# Patient Record
Sex: FEMALE | Race: ASIAN | NOT HISPANIC OR LATINO | Employment: OTHER | URBAN - METROPOLITAN AREA
[De-identification: names, ages, dates, MRNs, and addresses within clinical notes are randomized per-mention and may not be internally consistent; named-entity substitution may affect disease eponyms.]

---

## 2019-10-21 ENCOUNTER — HOME CARE/HOSPICE - HEALTHEAST (OUTPATIENT)
Dept: HOME HEALTH SERVICES | Facility: HOME HEALTH | Age: 79
End: 2019-10-21

## 2019-10-22 ENCOUNTER — COMMUNICATION - HEALTHEAST (OUTPATIENT)
Dept: NEUROSURGERY | Facility: CLINIC | Age: 79
End: 2019-10-22

## 2019-10-22 DIAGNOSIS — S06.5XAA SDH (SUBDURAL HEMATOMA) (H): ICD-10-CM

## 2019-11-06 ENCOUNTER — HOSPITAL ENCOUNTER (OUTPATIENT)
Dept: CT IMAGING | Facility: CLINIC | Age: 79
Discharge: HOME | End: 2019-11-06
Attending: NEUROLOGICAL SURGERY

## 2019-11-06 ENCOUNTER — OFFICE VISIT - HEALTHEAST (OUTPATIENT)
Dept: NEUROSURGERY | Facility: CLINIC | Age: 79
End: 2019-11-06

## 2019-11-06 DIAGNOSIS — S06.5XAA SDH (SUBDURAL HEMATOMA) (H): ICD-10-CM

## 2019-11-06 DIAGNOSIS — S06.5XAA SUBDURAL HEMATOMA (H): ICD-10-CM

## 2019-11-06 ASSESSMENT — MIFFLIN-ST. JEOR: SCORE: 879.59

## 2020-07-29 ENCOUNTER — COMMUNICATION - HEALTHEAST (OUTPATIENT)
Dept: MEDSURG UNIT | Facility: CLINIC | Age: 80
End: 2020-07-29

## 2020-07-31 ENCOUNTER — COMMUNICATION - HEALTHEAST (OUTPATIENT)
Dept: SCHEDULING | Facility: CLINIC | Age: 80
End: 2020-07-31

## 2021-06-02 NOTE — TELEPHONE ENCOUNTER
PATIENT NAME:  Ivan Hook  YOB: 1940  MRN: [id number]  SURGEON: Dr. Gomez  DATE of CONSULT:  10-20-19  DIAGNOSIS:  FALL, ON ASA:  SDH    FOLLOW-UP:  Post HOSPITAL CONSULT F/U Visit: 2 weeks   Post-op Provider: NP  DIAGNOSTICS:  radiology: CT scan: HEAD, WITHOUT  (ORDERED 10-22-19)  DISPOSITION:  HOME WITH FAMILY/HOME HEALTH    ADDITIONAL INSTRUCTIONS FOR MEDICAL STAFF:    No Aspirin, Ibuprofen, Motrin, Advil, Aleve, Naproxen, or NSAIDs.   No Keppra

## 2021-06-03 VITALS
HEIGHT: 59 IN | WEIGHT: 110 LBS | DIASTOLIC BLOOD PRESSURE: 49 MMHG | HEART RATE: 83 BPM | BODY MASS INDEX: 22.18 KG/M2 | SYSTOLIC BLOOD PRESSURE: 81 MMHG | OXYGEN SATURATION: 93 %

## 2021-06-03 NOTE — PROGRESS NOTES
CHART NOTE     1940     DATE OF SERVICE: 11/6/2019     FOLLOW UP EVALUATION:      ASSESSMENT :Ivan Hook was last seen as an inpatient w tentorial SDH. She was treated conservatively.  She's here today for follow up.  She is seen w  and her daughter. She denies any symptoms--her imaging shows resolution of hematoma, and her exam is intact       PLAN: No further follow up needed         PAST MEDICAL HISTORY, SURGICAL HISTORY, REVIEW OF SYMPTOMS, MEDICATIONS AND ALLERGIES:  Past medical history, surgical history, review of symptoms, medications and allergies remain unchanged.    Vitals:    11/06/19 1102   BP: (!) 81/49   Pulse: 83   SpO2: 93%        PHYSICAL EXAM:  Neurological exam reveals:  Alert and oriented x3, speech fluent and appropriate.   PERRL, EOMI, face symmetric, tongue midline, shoulder shrug equal  No pronator drift, finger to nose smooth and accurate bilaterally  Arm strength bilateral grasp, biceps, triceps, and deltoids 5/5   Leg strength bilateral dorsiflexion, plantar flexion, and hip flexion 5/5  Muscle Bulk and tone wnl.   Reflexes:No pathological reflexes   Gait and station:She's in a wheelchair

## 2021-06-16 PROBLEM — I60.9 SAH (SUBARACHNOID HEMORRHAGE) (H): Status: ACTIVE | Noted: 2019-10-19

## 2021-06-16 PROBLEM — E87.20 ACIDOSIS: Status: ACTIVE | Noted: 2019-10-19

## 2021-06-16 PROBLEM — M81.0 OSTEOPOROSIS: Status: ACTIVE | Noted: 2019-10-20

## 2021-06-16 PROBLEM — S06.5XAA SDH (SUBDURAL HEMATOMA) (H): Status: ACTIVE | Noted: 2019-10-19

## 2021-06-16 PROBLEM — R53.1 WEAKNESS: Status: ACTIVE | Noted: 2019-10-19

## 2021-06-16 PROBLEM — E16.2 HYPOGLYCEMIA: Status: ACTIVE | Noted: 2019-10-20

## 2021-06-20 NOTE — LETTER
Letter by Severson, Tammie F, LPN at      Author: Severson, Tammie F, LPN Service: -- Author Type: --    Filed:  Encounter Date: 7/29/2020 Status: (Other)       7/29/2020        M[facility] Vang [address] [place] [address]    2019-nCOV   Date Value Ref Range Status   07/26/2020 Not Detected  Final     Comment:     Collection of multiple specimens from the same patient may be necessary to  detect the virus. The possibility of a false negative should be considered if  the patient's recent exposure or clinical presentation suggests 2019 nCOV  infection and diagnostic tests for other causes of illness are negative. Repeat  testing may be considered in this setting.  Viral RNA was extracted via a validated method and subsequently underwent  single step reverse transcriptase-real time polymerase chain reaction using  primers to the CDC specified N1,N2 gene targets of CoV2 and human RNP as an  internal control.  A negative result does not rule out the presence of real-time PCR inhibitors in  the specimen or COVID-19 RNA in concentrations below the limit of detection of  the assay. The possibility of a false negative should be considered if the  patients recent exposure or clinical presentation suggests COVID-19. Additional  testing or repeat testing requires consultation with the laboratory.  Nasopharyngeal specimen is the preferred choice for swab-based SARS CoV2  testing. When collection of a nasopharyngeal swab is not possible the following  are acceptable alternatives:  an oropharyngeal (OP) specimen collected by a healthcare professional, or a  nasal mid-turbinate (NMT) swab collected by a healthcare professional or by  onsite self-collection (using a flocked tapered swab), or an anterior nares  specimen collected by a healthcare professional or by onsite self-collection  (using a round foam swab). (Centers for Disease Control)  Testing performed by [facility], Room [address], [address]  [place] [address]. This test was developed and its  performance characteristics determined by the [facility]. It has not been cleared or approved by the FDA.  The laboratory is regulated under the Clinical Laboratory Improvement  Amendments of 1988 (CLIA-88) as qualified to perform high-complexity testing.  This test is used for clinical purposes. It should not be regarded as  investigational or for research.    Performed and/or entered by:  [place] [address]        No results found for: [id number]    This letter provides a written record that you were tested for COVID-19.    Daim ntawv no yog bushra tseg qhia tias koj tau alex kuaj tus kab mob COVID-19 lawm. SaibDate of Test (Hnub Rene Ntsuam Sim) sab saum no.     Tau kuaj pom tias koj tsis muaj mob. Qhov no txhais tias peb nrhiav tsis pom tus kab mob uas tsim ua mahad muaj mob COVID-19 thaum kuaj koj. Muaj daniel thaum alex kuaj yuav qhia tias tsis muaj mob tabsis qhov tseeb tiag muaj tus kab mob lawm. Qhov no tshwm sim tau thaum uas tus kab mob tau nyuam qhuav kis tshiab lawm xwb, ua ntej koj yuav hnov muaj mob.    Yog tias koj muaj mob   Nyob twjywm hauv tsev thiab nyob kom nrug lilliana ntawm lwm tus (cais nyob ib leeg) kom txog thaum koj ua tau raws li cov becky qhia TXHUA qhov hauv qab no:      Koj tsis ua npaws--thiab tsis tas yuav noj tshuaj los txo qhov ua npaws--tau 3 hnub txwm nkaus (72 teev). Thiab ?    Koj lwm dixie mob khees zog lawm. Piv txwv, koj qhov hnoos lossis alex ua pa nyuaj khees lawm. Thiab?    Yam tsawg kawg nkaus yog 10 hnub lawm txij thaum koj tau pib mob.    Ncua sijhawm no:    Nyob twjywm ntawm tsev xwb. Tsis txhob mus haujlwm, mus kawm ntawv lossis mus qhovtwg li.     Nyob twjywm hauv koj lub chav, txawm yuav noj mov los. Koj ib leeg nkaus xwb siv koj ib lub chav ector yog ua tau.    Nyob kom nrug lilliana ntawm lwm tus hauv koj tsev. Tsis txhob khawm,  "hnia lossis tuav pia. Tsis pub muaj qhua tuaj xyuas.    Tu cov chaw uas \"kov heev\" tas li (cov pob liaj qhov rooj, cov qaum rooj, cov pia tuav, thiab lwm yam.). Siv cov tshuaj tu tsev lossis daniel ntaub so tov tshuaj ntxuav. Koj yuav pom cov tshuaj yus siv tau tag nrho li ntawm EPA lub vej xaij nyob ntawm www.epa.gov/pesticide-registration/list-n-disinfectants-use-against-sars-cov-2.    Siv daim ntaub npog qhov ncauj qhov ntswg, ntawv this-suj lossis ntaub so los npog koj lub qhov ncauj thiab qhov ntswg matthias kom txhob kis kab mob tawm mus.    Muab ector thiab xab npum ntxuav koj ob txhais pia tas christine.    Yog yuav rov mus haujlwm  Nug koj tus lospav haujlwm yves puas muaj alex taw qhia yog yuav rov mus haujlwm.  Cov lospav haujlwm: Daim ntawv no yog ua daim ntawv ceeb toom ntawm kws tim mob qhia raws kevcai tias koj tus neeg ntiav ua haujlwm raug kuaj pom tias tsis mob COVID-19 lawm, raws li hnub kuaj bushra saum byron no.    English Translation    This letter provides a written record that you were tested for COVID-19. See Date of Test (Date of Test) above.     Your result was negative. This means that we didnt find the virus that causes COVID-19 in your sample. A test may show negative when you do actually have the virus. This can happen when the virus is in the early stages of infection, before you feel illness symptoms.    If you have symptoms   Stay home and away from others (self-isolate) until you meet ALL of the guidelines below:      Youve had no fever--and no medicine that reduces fever--for 3 full days (72 hours). And ?    Your other symptoms have gotten better. For example, your cough or breathing has improved. And?    At least 10 days have passed since your symptoms started.    During this time:    Stay home. Dont go to work, school or anywhere else.     Stay in your own room, including for meals. Use your own bathroom if you can.    Stay away from others in your home. No hugging, kissing or shaking hands. No " visitors.    Clean high touch surfaces often (doorknobs, counters, handles, etc.). Use a household cleaning spray or wipes. You can find a full list on the EPA website at www.epa.gov/pesticide-registration/list-n-disinfectants-use-against-sars-cov-2.    Cover your mouth and nose with a mask, tissue or washcloth to avoid spreading germs.    Wash your hands and face often with soap and water.    Going back to work  Check with your employer for any guidelines to follow for going back to work.    Employers: This document serves as formal notice that your employee tested negative for COVID-19, as of the testing date shown above.

## 2022-01-01 ENCOUNTER — HOSPITAL ENCOUNTER (INPATIENT)
Facility: HOSPITAL | Age: 82
LOS: 3 days | Discharge: HOME HEALTH | DRG: 177 | End: 2022-04-29
Attending: EMERGENCY MEDICINE | Admitting: HOSPITALIST
Payer: COMMERCIAL

## 2022-01-01 ENCOUNTER — APPOINTMENT (OUTPATIENT)
Dept: SPEECH THERAPY | Facility: HOSPITAL | Age: 82
DRG: 101 | End: 2022-01-01
Payer: COMMERCIAL

## 2022-01-01 ENCOUNTER — HOSPITAL ENCOUNTER (INPATIENT)
Facility: HOSPITAL | Age: 82
LOS: 1 days | DRG: 884 | End: 2022-07-19
Attending: EMERGENCY MEDICINE | Admitting: FAMILY MEDICINE
Payer: COMMERCIAL

## 2022-01-01 ENCOUNTER — APPOINTMENT (OUTPATIENT)
Dept: RADIOLOGY | Facility: HOSPITAL | Age: 82
DRG: 101 | End: 2022-01-01
Payer: COMMERCIAL

## 2022-01-01 ENCOUNTER — APPOINTMENT (OUTPATIENT)
Dept: PHYSICAL THERAPY | Facility: HOSPITAL | Age: 82
DRG: 177 | End: 2022-01-01
Attending: INTERNAL MEDICINE
Payer: COMMERCIAL

## 2022-01-01 ENCOUNTER — APPOINTMENT (OUTPATIENT)
Dept: CT IMAGING | Facility: HOSPITAL | Age: 82
DRG: 101 | End: 2022-01-01
Attending: EMERGENCY MEDICINE
Payer: COMMERCIAL

## 2022-01-01 ENCOUNTER — APPOINTMENT (OUTPATIENT)
Dept: RADIOLOGY | Facility: HOSPITAL | Age: 82
DRG: 884 | End: 2022-01-01
Attending: EMERGENCY MEDICINE
Payer: COMMERCIAL

## 2022-01-01 ENCOUNTER — APPOINTMENT (OUTPATIENT)
Dept: CT IMAGING | Facility: HOSPITAL | Age: 82
DRG: 177 | End: 2022-01-01
Attending: INTERNAL MEDICINE
Payer: COMMERCIAL

## 2022-01-01 ENCOUNTER — APPOINTMENT (OUTPATIENT)
Dept: PHYSICAL THERAPY | Facility: HOSPITAL | Age: 82
DRG: 101 | End: 2022-01-01
Payer: COMMERCIAL

## 2022-01-01 ENCOUNTER — PATIENT OUTREACH (OUTPATIENT)
Dept: CARE COORDINATION | Facility: CLINIC | Age: 82
End: 2022-01-01
Payer: COMMERCIAL

## 2022-01-01 ENCOUNTER — APPOINTMENT (OUTPATIENT)
Dept: CT IMAGING | Facility: HOSPITAL | Age: 82
DRG: 177 | End: 2022-01-01
Attending: EMERGENCY MEDICINE
Payer: COMMERCIAL

## 2022-01-01 ENCOUNTER — APPOINTMENT (OUTPATIENT)
Dept: NEUROLOGY | Facility: HOSPITAL | Age: 82
DRG: 101 | End: 2022-01-01
Attending: PSYCHIATRY & NEUROLOGY
Payer: COMMERCIAL

## 2022-01-01 ENCOUNTER — HOSPITAL ENCOUNTER (INPATIENT)
Facility: HOSPITAL | Age: 82
LOS: 4 days | Discharge: HOME | DRG: 101 | End: 2022-06-30
Attending: EMERGENCY MEDICINE | Admitting: STUDENT IN AN ORGANIZED HEALTH CARE EDUCATION/TRAINING PROGRAM
Payer: COMMERCIAL

## 2022-01-01 ENCOUNTER — APPOINTMENT (OUTPATIENT)
Dept: ULTRASOUND IMAGING | Facility: HOSPITAL | Age: 82
DRG: 177 | End: 2022-01-01
Attending: INTERNAL MEDICINE
Payer: COMMERCIAL

## 2022-01-01 ENCOUNTER — PATIENT OUTREACH (OUTPATIENT)
Dept: CARE COORDINATION | Facility: CLINIC | Age: 82
End: 2022-01-01

## 2022-01-01 ENCOUNTER — APPOINTMENT (OUTPATIENT)
Dept: RADIOLOGY | Facility: HOSPITAL | Age: 82
DRG: 177 | End: 2022-01-01
Attending: EMERGENCY MEDICINE
Payer: COMMERCIAL

## 2022-01-01 VITALS
DIASTOLIC BLOOD PRESSURE: 55 MMHG | OXYGEN SATURATION: 99 % | HEIGHT: 59 IN | BODY MASS INDEX: 14.32 KG/M2 | RESPIRATION RATE: 18 BRPM | TEMPERATURE: 98.3 F | SYSTOLIC BLOOD PRESSURE: 124 MMHG | HEART RATE: 99 BPM | WEIGHT: 71 LBS

## 2022-01-01 VITALS
HEART RATE: 76 BPM | RESPIRATION RATE: 18 BRPM | BODY MASS INDEX: 17.86 KG/M2 | WEIGHT: 77.19 LBS | DIASTOLIC BLOOD PRESSURE: 60 MMHG | TEMPERATURE: 98.1 F | OXYGEN SATURATION: 93 % | SYSTOLIC BLOOD PRESSURE: 124 MMHG | HEIGHT: 55 IN

## 2022-01-01 VITALS
HEART RATE: 172 BPM | TEMPERATURE: 102.8 F | OXYGEN SATURATION: 91 % | SYSTOLIC BLOOD PRESSURE: 95 MMHG | RESPIRATION RATE: 44 BRPM | DIASTOLIC BLOOD PRESSURE: 51 MMHG

## 2022-01-01 DIAGNOSIS — R56.9 SEIZURES (H): ICD-10-CM

## 2022-01-01 DIAGNOSIS — R40.2430 GLASGOW COMA SCALE TOTAL SCORE 3-8, UNSPECIFIED COMA TIMING (H): ICD-10-CM

## 2022-01-01 DIAGNOSIS — R62.7 FAILURE TO THRIVE IN ADULT: Primary | ICD-10-CM

## 2022-01-01 DIAGNOSIS — E83.42 HYPOMAGNESEMIA: ICD-10-CM

## 2022-01-01 DIAGNOSIS — M62.81 MUSCLE WEAKNESS (GENERALIZED): ICD-10-CM

## 2022-01-01 DIAGNOSIS — E87.0 HYPERNATREMIA: ICD-10-CM

## 2022-01-01 DIAGNOSIS — Z71.89 OTHER SPECIFIED COUNSELING: ICD-10-CM

## 2022-01-01 DIAGNOSIS — R41.0 DELIRIUM: ICD-10-CM

## 2022-01-01 DIAGNOSIS — A41.89 SEPSIS DUE TO OTHER ETIOLOGY (H): ICD-10-CM

## 2022-01-01 DIAGNOSIS — U07.1 INFECTION DUE TO 2019 NOVEL CORONAVIRUS: ICD-10-CM

## 2022-01-01 LAB
ALBUMIN SERPL-MCNC: 2.5 G/DL (ref 3.5–5)
ALBUMIN SERPL-MCNC: 2.8 G/DL (ref 3.5–5)
ALBUMIN SERPL-MCNC: 3 G/DL (ref 3.5–5)
ALBUMIN SERPL-MCNC: 3.6 G/DL (ref 3.5–5)
ALBUMIN UR-MCNC: 10 MG/DL
ALBUMIN UR-MCNC: 30 MG/DL
ALP SERPL-CCNC: 74 U/L (ref 45–120)
ALP SERPL-CCNC: 75 U/L (ref 45–120)
ALP SERPL-CCNC: 98 U/L (ref 45–120)
ALP SERPL-CCNC: 99 U/L (ref 45–120)
ALT SERPL W P-5'-P-CCNC: 11 U/L (ref 0–45)
ALT SERPL W P-5'-P-CCNC: 34 U/L (ref 0–45)
ALT SERPL W P-5'-P-CCNC: 9 U/L (ref 0–45)
ALT SERPL W P-5'-P-CCNC: <9 U/L (ref 0–45)
AMMONIA PLAS-SCNC: 21 UMOL/L (ref 11–35)
AMMONIA PLAS-SCNC: 25 UMOL/L (ref 11–35)
ANION GAP SERPL CALCULATED.3IONS-SCNC: 10 MMOL/L (ref 5–18)
ANION GAP SERPL CALCULATED.3IONS-SCNC: 12 MMOL/L (ref 5–18)
ANION GAP SERPL CALCULATED.3IONS-SCNC: 14 MMOL/L (ref 5–18)
ANION GAP SERPL CALCULATED.3IONS-SCNC: 18 MMOL/L (ref 5–18)
ANION GAP SERPL CALCULATED.3IONS-SCNC: 5 MMOL/L (ref 5–18)
ANION GAP SERPL CALCULATED.3IONS-SCNC: 5 MMOL/L (ref 5–18)
ANION GAP SERPL CALCULATED.3IONS-SCNC: 9 MMOL/L (ref 5–18)
APPEARANCE UR: CLEAR
APPEARANCE UR: CLEAR
APTT PPP: 37 SECONDS (ref 22–38)
AST SERPL W P-5'-P-CCNC: 106 U/L (ref 0–40)
AST SERPL W P-5'-P-CCNC: 17 U/L (ref 0–40)
AST SERPL W P-5'-P-CCNC: 24 U/L (ref 0–40)
AST SERPL W P-5'-P-CCNC: 35 U/L (ref 0–40)
ATRIAL RATE - MUSE: 77 BPM
ATRIAL RATE - MUSE: 99 BPM
BACTERIA #/AREA URNS HPF: ABNORMAL /HPF
BACTERIA BLD CULT: NO GROWTH
BACTERIA UR CULT: ABNORMAL
BACTERIA UR CULT: NO GROWTH
BASE EXCESS BLDV CALC-SCNC: 1 MMOL/L
BASE EXCESS BLDV CALC-SCNC: 3.6 MMOL/L
BASOPHILS # BLD AUTO: 0 10E3/UL (ref 0–0.2)
BASOPHILS # BLD MANUAL: 0 10E3/UL (ref 0–0.2)
BASOPHILS NFR BLD AUTO: 0 %
BASOPHILS NFR BLD MANUAL: 0 %
BILIRUB DIRECT SERPL-MCNC: 0.1 MG/DL
BILIRUB SERPL-MCNC: 0.3 MG/DL (ref 0–1)
BILIRUB SERPL-MCNC: 0.6 MG/DL (ref 0–1)
BILIRUB SERPL-MCNC: 0.7 MG/DL (ref 0–1)
BILIRUB SERPL-MCNC: 0.9 MG/DL (ref 0–1)
BILIRUB UR QL STRIP: NEGATIVE
BILIRUB UR QL STRIP: NEGATIVE
BUN SERPL-MCNC: 10 MG/DL (ref 8–28)
BUN SERPL-MCNC: 11 MG/DL (ref 8–28)
BUN SERPL-MCNC: 13 MG/DL (ref 8–28)
BUN SERPL-MCNC: 14 MG/DL (ref 8–28)
BUN SERPL-MCNC: 18 MG/DL (ref 8–28)
BUN SERPL-MCNC: 21 MG/DL (ref 8–28)
BUN SERPL-MCNC: 4 MG/DL (ref 8–28)
BUN SERPL-MCNC: 4 MG/DL (ref 8–28)
BUN SERPL-MCNC: 5 MG/DL (ref 8–28)
BUN SERPL-MCNC: 51 MG/DL (ref 8–28)
BUN SERPL-MCNC: 8 MG/DL (ref 8–28)
C REACTIVE PROTEIN LHE: 1.9 MG/DL (ref 0–0.8)
C REACTIVE PROTEIN LHE: 2.2 MG/DL (ref 0–0.8)
C REACTIVE PROTEIN LHE: 2.6 MG/DL (ref 0–0.8)
C REACTIVE PROTEIN LHE: 2.7 MG/DL (ref 0–0.8)
C REACTIVE PROTEIN LHE: 2.8 MG/DL (ref 0–0.8)
CALCIUM SERPL-MCNC: 8 MG/DL (ref 8.5–10.5)
CALCIUM SERPL-MCNC: 8.1 MG/DL (ref 8.5–10.5)
CALCIUM SERPL-MCNC: 8.5 MG/DL (ref 8.5–10.5)
CALCIUM SERPL-MCNC: 8.7 MG/DL (ref 8.5–10.5)
CALCIUM SERPL-MCNC: 8.7 MG/DL (ref 8.5–10.5)
CALCIUM SERPL-MCNC: 8.8 MG/DL (ref 8.5–10.5)
CALCIUM SERPL-MCNC: 9.7 MG/DL (ref 8.5–10.5)
CALCIUM SERPL-MCNC: 9.8 MG/DL (ref 8.5–10.5)
CHLORIDE BLD-SCNC: 102 MMOL/L (ref 98–107)
CHLORIDE BLD-SCNC: 103 MMOL/L (ref 98–107)
CHLORIDE BLD-SCNC: 104 MMOL/L (ref 98–107)
CHLORIDE BLD-SCNC: 108 MMOL/L (ref 98–107)
CHLORIDE BLD-SCNC: 108 MMOL/L (ref 98–107)
CHLORIDE BLD-SCNC: 109 MMOL/L (ref 98–107)
CHLORIDE BLD-SCNC: 109 MMOL/L (ref 98–107)
CHLORIDE BLD-SCNC: 110 MMOL/L (ref 98–107)
CHLORIDE BLD-SCNC: 113 MMOL/L (ref 98–107)
CO2 SERPL-SCNC: 21 MMOL/L (ref 22–31)
CO2 SERPL-SCNC: 22 MMOL/L (ref 22–31)
CO2 SERPL-SCNC: 23 MMOL/L (ref 22–31)
CO2 SERPL-SCNC: 24 MMOL/L (ref 22–31)
CO2 SERPL-SCNC: 25 MMOL/L (ref 22–31)
CO2 SERPL-SCNC: 29 MMOL/L (ref 22–31)
CO2 SERPL-SCNC: 32 MMOL/L (ref 22–31)
COLOR UR AUTO: ABNORMAL
COLOR UR AUTO: ABNORMAL
CREAT SERPL-MCNC: 0.65 MG/DL (ref 0.6–1.1)
CREAT SERPL-MCNC: 0.68 MG/DL (ref 0.6–1.1)
CREAT SERPL-MCNC: 0.68 MG/DL (ref 0.6–1.1)
CREAT SERPL-MCNC: 0.7 MG/DL (ref 0.6–1.1)
CREAT SERPL-MCNC: 0.72 MG/DL (ref 0.6–1.1)
CREAT SERPL-MCNC: 0.72 MG/DL (ref 0.6–1.1)
CREAT SERPL-MCNC: 0.78 MG/DL (ref 0.6–1.1)
CREAT SERPL-MCNC: 0.87 MG/DL (ref 0.6–1.1)
CREAT SERPL-MCNC: 0.91 MG/DL (ref 0.6–1.1)
CREAT SERPL-MCNC: 0.96 MG/DL (ref 0.6–1.1)
CREAT SERPL-MCNC: 1.34 MG/DL (ref 0.6–1.1)
CREAT SERPL-MCNC: 1.7 MG/DL (ref 0.6–1.1)
D DIMER PPP FEU-MCNC: 17.75 UG/ML FEU (ref 0–0.5)
D DIMER PPP FEU-MCNC: 2 UG/ML FEU (ref 0–0.5)
D DIMER PPP FEU-MCNC: 2.59 UG/ML FEU (ref 0–0.5)
D DIMER PPP FEU-MCNC: 3.05 UG/ML FEU (ref 0–0.5)
D DIMER PPP FEU-MCNC: 3.61 UG/ML FEU (ref 0–0.5)
DEPRECATED CALCIDIOL+CALCIFEROL SERPL-MC: 20 UG/L (ref 20–75)
DIASTOLIC BLOOD PRESSURE - MUSE: 76 MMHG
DIASTOLIC BLOOD PRESSURE - MUSE: NORMAL MMHG
EOSINOPHIL # BLD AUTO: 0 10E3/UL (ref 0–0.7)
EOSINOPHIL # BLD MANUAL: 0 10E3/UL (ref 0–0.7)
EOSINOPHIL NFR BLD AUTO: 0 %
EOSINOPHIL NFR BLD MANUAL: 0 %
ERYTHROCYTE [DISTWIDTH] IN BLOOD BY AUTOMATED COUNT: 12.6 % (ref 10–15)
ERYTHROCYTE [DISTWIDTH] IN BLOOD BY AUTOMATED COUNT: 12.8 % (ref 10–15)
ERYTHROCYTE [DISTWIDTH] IN BLOOD BY AUTOMATED COUNT: 12.8 % (ref 10–15)
ERYTHROCYTE [DISTWIDTH] IN BLOOD BY AUTOMATED COUNT: 13 % (ref 10–15)
ERYTHROCYTE [DISTWIDTH] IN BLOOD BY AUTOMATED COUNT: 13.1 % (ref 10–15)
ERYTHROCYTE [DISTWIDTH] IN BLOOD BY AUTOMATED COUNT: 13.2 % (ref 10–15)
FLUAV RNA SPEC QL NAA+PROBE: NEGATIVE
FLUBV RNA RESP QL NAA+PROBE: NEGATIVE
FOLATE SERPL-MCNC: 9.5 NG/ML
GFR SERPL CREATININE-BSD FRML MDRD: 30 ML/MIN/1.73M2
GFR SERPL CREATININE-BSD FRML MDRD: 40 ML/MIN/1.73M2
GFR SERPL CREATININE-BSD FRML MDRD: 59 ML/MIN/1.73M2
GFR SERPL CREATININE-BSD FRML MDRD: 63 ML/MIN/1.73M2
GFR SERPL CREATININE-BSD FRML MDRD: 67 ML/MIN/1.73M2
GFR SERPL CREATININE-BSD FRML MDRD: 76 ML/MIN/1.73M2
GFR SERPL CREATININE-BSD FRML MDRD: 84 ML/MIN/1.73M2
GFR SERPL CREATININE-BSD FRML MDRD: 84 ML/MIN/1.73M2
GFR SERPL CREATININE-BSD FRML MDRD: 86 ML/MIN/1.73M2
GFR SERPL CREATININE-BSD FRML MDRD: 88 ML/MIN/1.73M2
GFR SERPL CREATININE-BSD FRML MDRD: 88 ML/MIN/1.73M2
GFR SERPL CREATININE-BSD FRML MDRD: 89 ML/MIN/1.73M2
GLUCOSE BLD-MCNC: 101 MG/DL (ref 70–125)
GLUCOSE BLD-MCNC: 103 MG/DL (ref 70–125)
GLUCOSE BLD-MCNC: 106 MG/DL (ref 70–125)
GLUCOSE BLD-MCNC: 115 MG/DL (ref 70–125)
GLUCOSE BLD-MCNC: 251 MG/DL (ref 70–125)
GLUCOSE BLD-MCNC: 69 MG/DL (ref 70–125)
GLUCOSE BLD-MCNC: 76 MG/DL (ref 70–125)
GLUCOSE BLD-MCNC: 86 MG/DL (ref 70–125)
GLUCOSE BLD-MCNC: 90 MG/DL (ref 70–125)
GLUCOSE BLD-MCNC: 95 MG/DL (ref 70–125)
GLUCOSE BLD-MCNC: 99 MG/DL (ref 70–125)
GLUCOSE BLDC GLUCOMTR-MCNC: 101 MG/DL (ref 70–99)
GLUCOSE BLDC GLUCOMTR-MCNC: 105 MG/DL (ref 70–99)
GLUCOSE BLDC GLUCOMTR-MCNC: 107 MG/DL (ref 70–99)
GLUCOSE BLDC GLUCOMTR-MCNC: 114 MG/DL (ref 70–99)
GLUCOSE BLDC GLUCOMTR-MCNC: 117 MG/DL (ref 70–99)
GLUCOSE BLDC GLUCOMTR-MCNC: 130 MG/DL (ref 70–99)
GLUCOSE BLDC GLUCOMTR-MCNC: 130 MG/DL (ref 70–99)
GLUCOSE BLDC GLUCOMTR-MCNC: 133 MG/DL (ref 70–99)
GLUCOSE BLDC GLUCOMTR-MCNC: 135 MG/DL (ref 70–99)
GLUCOSE BLDC GLUCOMTR-MCNC: 138 MG/DL (ref 70–99)
GLUCOSE BLDC GLUCOMTR-MCNC: 141 MG/DL (ref 70–99)
GLUCOSE BLDC GLUCOMTR-MCNC: 147 MG/DL (ref 70–99)
GLUCOSE BLDC GLUCOMTR-MCNC: 149 MG/DL (ref 70–99)
GLUCOSE BLDC GLUCOMTR-MCNC: 153 MG/DL (ref 70–99)
GLUCOSE BLDC GLUCOMTR-MCNC: 158 MG/DL (ref 70–99)
GLUCOSE BLDC GLUCOMTR-MCNC: 160 MG/DL (ref 70–99)
GLUCOSE BLDC GLUCOMTR-MCNC: 163 MG/DL (ref 70–99)
GLUCOSE BLDC GLUCOMTR-MCNC: 172 MG/DL (ref 70–99)
GLUCOSE BLDC GLUCOMTR-MCNC: 179 MG/DL (ref 70–99)
GLUCOSE BLDC GLUCOMTR-MCNC: 182 MG/DL (ref 70–99)
GLUCOSE BLDC GLUCOMTR-MCNC: 201 MG/DL (ref 70–99)
GLUCOSE BLDC GLUCOMTR-MCNC: 205 MG/DL (ref 70–99)
GLUCOSE BLDC GLUCOMTR-MCNC: 60 MG/DL (ref 70–99)
GLUCOSE BLDC GLUCOMTR-MCNC: 63 MG/DL (ref 70–99)
GLUCOSE BLDC GLUCOMTR-MCNC: 67 MG/DL (ref 70–99)
GLUCOSE BLDC GLUCOMTR-MCNC: 69 MG/DL (ref 70–99)
GLUCOSE BLDC GLUCOMTR-MCNC: 70 MG/DL (ref 70–99)
GLUCOSE BLDC GLUCOMTR-MCNC: 72 MG/DL (ref 70–99)
GLUCOSE BLDC GLUCOMTR-MCNC: 73 MG/DL (ref 70–99)
GLUCOSE BLDC GLUCOMTR-MCNC: 74 MG/DL (ref 70–99)
GLUCOSE BLDC GLUCOMTR-MCNC: 82 MG/DL (ref 70–99)
GLUCOSE BLDC GLUCOMTR-MCNC: 82 MG/DL (ref 70–99)
GLUCOSE BLDC GLUCOMTR-MCNC: 88 MG/DL (ref 70–99)
GLUCOSE BLDC GLUCOMTR-MCNC: 94 MG/DL (ref 70–99)
GLUCOSE BLDC GLUCOMTR-MCNC: 95 MG/DL (ref 70–99)
GLUCOSE BLDC GLUCOMTR-MCNC: 97 MG/DL (ref 70–99)
GLUCOSE BLDC GLUCOMTR-MCNC: 99 MG/DL (ref 70–99)
GLUCOSE UR STRIP-MCNC: NEGATIVE MG/DL
GLUCOSE UR STRIP-MCNC: NEGATIVE MG/DL
HBA1C MFR BLD: 5 %
HCO3 BLDV-SCNC: 24 MMOL/L (ref 24–30)
HCO3 BLDV-SCNC: 26 MMOL/L (ref 24–30)
HCT VFR BLD AUTO: 28.4 % (ref 35–47)
HCT VFR BLD AUTO: 28.6 % (ref 35–47)
HCT VFR BLD AUTO: 28.6 % (ref 35–47)
HCT VFR BLD AUTO: 29 % (ref 35–47)
HCT VFR BLD AUTO: 30.6 % (ref 35–47)
HCT VFR BLD AUTO: 30.6 % (ref 35–47)
HCT VFR BLD AUTO: 30.8 % (ref 35–47)
HCT VFR BLD AUTO: 30.8 % (ref 35–47)
HCT VFR BLD AUTO: 31 % (ref 35–47)
HCT VFR BLD AUTO: 32.2 % (ref 35–47)
HCT VFR BLD AUTO: 35.8 % (ref 35–47)
HCT VFR BLD AUTO: 36.3 % (ref 35–47)
HGB BLD-MCNC: 10 G/DL (ref 11.7–15.7)
HGB BLD-MCNC: 10.1 G/DL (ref 11.7–15.7)
HGB BLD-MCNC: 10.4 G/DL (ref 11.7–15.7)
HGB BLD-MCNC: 11 G/DL (ref 11.7–15.7)
HGB BLD-MCNC: 11.7 G/DL (ref 11.7–15.7)
HGB BLD-MCNC: 9.1 G/DL (ref 11.7–15.7)
HGB BLD-MCNC: 9.1 G/DL (ref 11.7–15.7)
HGB BLD-MCNC: 9.2 G/DL (ref 11.7–15.7)
HGB BLD-MCNC: 9.3 G/DL (ref 11.7–15.7)
HGB BLD-MCNC: 9.8 G/DL (ref 11.7–15.7)
HGB BLD-MCNC: 9.8 G/DL (ref 11.7–15.7)
HGB BLD-MCNC: 9.9 G/DL (ref 11.7–15.7)
HGB UR QL STRIP: ABNORMAL
HGB UR QL STRIP: ABNORMAL
HOLD SPECIMEN: NORMAL
HYALINE CASTS: 1 /LPF
HYALINE CASTS: 1 /LPF
IMM GRANULOCYTES # BLD: 0.1 10E3/UL
IMM GRANULOCYTES # BLD: 0.1 10E3/UL
IMM GRANULOCYTES # BLD: 0.2 10E3/UL
IMM GRANULOCYTES NFR BLD: 1 %
IMM GRANULOCYTES NFR BLD: 1 %
IMM GRANULOCYTES NFR BLD: 2 %
INR PPP: 1.19 (ref 0.85–1.15)
INR PPP: 1.29 (ref 0.9–1.15)
INTERPRETATION ECG - MUSE: NORMAL
INTERPRETATION ECG - MUSE: NORMAL
IRON SERPL-MCNC: 76 UG/DL (ref 42–175)
KETONES UR STRIP-MCNC: 40 MG/DL
KETONES UR STRIP-MCNC: 40 MG/DL
LACTATE SERPL-SCNC: 1 MMOL/L (ref 0.7–2)
LACTATE SERPL-SCNC: 4.1 MMOL/L (ref 0.7–2)
LDH SERPL L TO P-CCNC: 266 U/L (ref 125–220)
LEUKOCYTE ESTERASE UR QL STRIP: NEGATIVE
LEUKOCYTE ESTERASE UR QL STRIP: NEGATIVE
LEVETIRACETAM SERPL-MCNC: 22.1 ΜG/ML (ref 10–40)
LEVETIRACETAM SERPL-MCNC: <2 ΜG/ML (ref 10–40)
LIPASE SERPL-CCNC: 24 U/L (ref 0–52)
LYMPHOCYTES # BLD AUTO: 2 10E3/UL (ref 0.8–5.3)
LYMPHOCYTES # BLD AUTO: 2.1 10E3/UL (ref 0.8–5.3)
LYMPHOCYTES # BLD AUTO: 2.7 10E3/UL (ref 0.8–5.3)
LYMPHOCYTES # BLD MANUAL: 3.3 10E3/UL (ref 0.8–5.3)
LYMPHOCYTES NFR BLD AUTO: 22 %
LYMPHOCYTES NFR BLD AUTO: 23 %
LYMPHOCYTES NFR BLD AUTO: 26 %
LYMPHOCYTES NFR BLD MANUAL: 10 %
MAGNESIUM SERPL-MCNC: 1.3 MG/DL (ref 1.8–2.6)
MAGNESIUM SERPL-MCNC: 1.5 MG/DL (ref 1.8–2.6)
MAGNESIUM SERPL-MCNC: 1.6 MG/DL (ref 1.8–2.6)
MAGNESIUM SERPL-MCNC: 1.7 MG/DL (ref 1.8–2.6)
MAGNESIUM SERPL-MCNC: 1.8 MG/DL (ref 1.8–2.6)
MAGNESIUM SERPL-MCNC: 1.8 MG/DL (ref 1.8–2.6)
MAGNESIUM SERPL-MCNC: 1.9 MG/DL (ref 1.8–2.6)
MAGNESIUM SERPL-MCNC: 1.9 MG/DL (ref 1.8–2.6)
MAGNESIUM SERPL-MCNC: 2 MG/DL (ref 1.8–2.6)
MAGNESIUM SERPL-MCNC: 2.1 MG/DL (ref 1.8–2.6)
MCH RBC QN AUTO: 30.3 PG (ref 26.5–33)
MCH RBC QN AUTO: 30.7 PG (ref 26.5–33)
MCH RBC QN AUTO: 30.7 PG (ref 26.5–33)
MCH RBC QN AUTO: 30.8 PG (ref 26.5–33)
MCH RBC QN AUTO: 31 PG (ref 26.5–33)
MCH RBC QN AUTO: 31.2 PG (ref 26.5–33)
MCH RBC QN AUTO: 31.3 PG (ref 26.5–33)
MCH RBC QN AUTO: 31.5 PG (ref 26.5–33)
MCH RBC QN AUTO: 31.7 PG (ref 26.5–33)
MCH RBC QN AUTO: 31.7 PG (ref 26.5–33)
MCH RBC QN AUTO: 32.1 PG (ref 26.5–33)
MCH RBC QN AUTO: 32.2 PG (ref 26.5–33)
MCHC RBC AUTO-ENTMCNC: 30.3 G/DL (ref 31.5–36.5)
MCHC RBC AUTO-ENTMCNC: 31.6 G/DL (ref 31.5–36.5)
MCHC RBC AUTO-ENTMCNC: 31.8 G/DL (ref 31.5–36.5)
MCHC RBC AUTO-ENTMCNC: 31.8 G/DL (ref 31.5–36.5)
MCHC RBC AUTO-ENTMCNC: 32 G/DL (ref 31.5–36.5)
MCHC RBC AUTO-ENTMCNC: 32.1 G/DL (ref 31.5–36.5)
MCHC RBC AUTO-ENTMCNC: 32.2 G/DL (ref 31.5–36.5)
MCHC RBC AUTO-ENTMCNC: 32.3 G/DL (ref 31.5–36.5)
MCHC RBC AUTO-ENTMCNC: 32.4 G/DL (ref 31.5–36.5)
MCHC RBC AUTO-ENTMCNC: 32.5 G/DL (ref 31.5–36.5)
MCHC RBC AUTO-ENTMCNC: 32.7 G/DL (ref 31.5–36.5)
MCHC RBC AUTO-ENTMCNC: 33 G/DL (ref 31.5–36.5)
MCV RBC AUTO: 100 FL (ref 78–100)
MCV RBC AUTO: 103 FL (ref 78–100)
MCV RBC AUTO: 93 FL (ref 78–100)
MCV RBC AUTO: 95 FL (ref 78–100)
MCV RBC AUTO: 95 FL (ref 78–100)
MCV RBC AUTO: 96 FL (ref 78–100)
MCV RBC AUTO: 98 FL (ref 78–100)
MCV RBC AUTO: 99 FL (ref 78–100)
MCV RBC AUTO: 99 FL (ref 78–100)
MONOCYTES # BLD AUTO: 0.9 10E3/UL (ref 0–1.3)
MONOCYTES # BLD AUTO: 1.3 10E3/UL (ref 0–1.3)
MONOCYTES # BLD AUTO: 1.3 10E3/UL (ref 0–1.3)
MONOCYTES # BLD MANUAL: 4.9 10E3/UL (ref 0–1.3)
MONOCYTES NFR BLD AUTO: 11 %
MONOCYTES NFR BLD AUTO: 12 %
MONOCYTES NFR BLD AUTO: 14 %
MONOCYTES NFR BLD MANUAL: 15 %
MUCOUS THREADS #/AREA URNS LPF: PRESENT /LPF
NEUTROPHILS # BLD AUTO: 4.8 10E3/UL (ref 1.6–8.3)
NEUTROPHILS # BLD AUTO: 5.9 10E3/UL (ref 1.6–8.3)
NEUTROPHILS # BLD AUTO: 8 10E3/UL (ref 1.6–8.3)
NEUTROPHILS # BLD MANUAL: 24.5 10E3/UL (ref 1.6–8.3)
NEUTROPHILS NFR BLD AUTO: 61 %
NEUTROPHILS NFR BLD AUTO: 62 %
NEUTROPHILS NFR BLD AUTO: 65 %
NEUTROPHILS NFR BLD MANUAL: 75 %
NITRATE UR QL: NEGATIVE
NITRATE UR QL: POSITIVE
NRBC # BLD AUTO: 0 10E3/UL
NRBC BLD AUTO-RTO: 0 /100
OXYHGB MFR BLDV: 27.4 % (ref 70–75)
OXYHGB MFR BLDV: 39 % (ref 70–75)
P AXIS - MUSE: 68 DEGREES
P AXIS - MUSE: 70 DEGREES
PATH REPORT.COMMENTS IMP SPEC: NORMAL
PATH REPORT.COMMENTS IMP SPEC: NORMAL
PATH REPORT.FINAL DX SPEC: NORMAL
PATH REPORT.MICROSCOPIC SPEC OTHER STN: NORMAL
PATH REPORT.MICROSCOPIC SPEC OTHER STN: NORMAL
PCO2 BLDV: 53 MM HG (ref 35–50)
PCO2 BLDV: 54 MM HG (ref 35–50)
PH BLDV: 7.32 [PH] (ref 7.35–7.45)
PH BLDV: 7.35 [PH] (ref 7.35–7.45)
PH UR STRIP: 6 [PH] (ref 5–7)
PH UR STRIP: 6.5 [PH] (ref 5–7)
PLAT MORPH BLD: ABNORMAL
PLATELET # BLD AUTO: 122 10E3/UL (ref 150–450)
PLATELET # BLD AUTO: 144 10E3/UL (ref 150–450)
PLATELET # BLD AUTO: 196 10E3/UL (ref 150–450)
PLATELET # BLD AUTO: 207 10E3/UL (ref 150–450)
PLATELET # BLD AUTO: 211 10E3/UL (ref 150–450)
PLATELET # BLD AUTO: 211 10E3/UL (ref 150–450)
PLATELET # BLD AUTO: 241 10E3/UL (ref 150–450)
PLATELET # BLD AUTO: 69 10E3/UL (ref 150–450)
PLATELET # BLD AUTO: 69 10E3/UL (ref 150–450)
PLATELET # BLD AUTO: 81 10E3/UL (ref 150–450)
PLATELET # BLD AUTO: 91 10E3/UL (ref 150–450)
PLATELET # BLD AUTO: ABNORMAL 10*3/UL
PO2 BLDV: 23 MM HG (ref 25–47)
PO2 BLDV: 27 MM HG (ref 25–47)
POTASSIUM BLD-SCNC: 3 MMOL/L (ref 3.5–5)
POTASSIUM BLD-SCNC: 3.4 MMOL/L (ref 3.5–5)
POTASSIUM BLD-SCNC: 3.6 MMOL/L (ref 3.5–5)
POTASSIUM BLD-SCNC: 3.7 MMOL/L (ref 3.5–5)
POTASSIUM BLD-SCNC: 3.8 MMOL/L (ref 3.5–5)
POTASSIUM BLD-SCNC: 3.8 MMOL/L (ref 3.5–5)
POTASSIUM BLD-SCNC: 3.9 MMOL/L (ref 3.5–5)
POTASSIUM BLD-SCNC: 4 MMOL/L (ref 3.5–5)
POTASSIUM BLD-SCNC: 4.1 MMOL/L (ref 3.5–5)
POTASSIUM BLD-SCNC: 4.5 MMOL/L (ref 3.5–5)
PR INTERVAL - MUSE: 188 MS
PR INTERVAL - MUSE: 196 MS
PROCALCITONIN SERPL-MCNC: 0.02 NG/ML (ref 0–0.49)
PROT SERPL-MCNC: 6.8 G/DL (ref 6–8)
PROT SERPL-MCNC: 7.9 G/DL (ref 6–8)
PROT SERPL-MCNC: 8.1 G/DL (ref 6–8)
PROT SERPL-MCNC: 8.9 G/DL (ref 6–8)
QRS DURATION - MUSE: 60 MS
QRS DURATION - MUSE: 68 MS
QT - MUSE: 348 MS
QT - MUSE: 374 MS
QTC - MUSE: 423 MS
QTC - MUSE: 446 MS
R AXIS - MUSE: 12 DEGREES
R AXIS - MUSE: 6 DEGREES
RBC # BLD AUTO: 2.9 10E6/UL (ref 3.8–5.2)
RBC # BLD AUTO: 2.92 10E6/UL (ref 3.8–5.2)
RBC # BLD AUTO: 3 10E6/UL (ref 3.8–5.2)
RBC # BLD AUTO: 3.03 10E6/UL (ref 3.8–5.2)
RBC # BLD AUTO: 3.11 10E6/UL (ref 3.8–5.2)
RBC # BLD AUTO: 3.15 10E6/UL (ref 3.8–5.2)
RBC # BLD AUTO: 3.16 10E6/UL (ref 3.8–5.2)
RBC # BLD AUTO: 3.23 10E6/UL (ref 3.8–5.2)
RBC # BLD AUTO: 3.23 10E6/UL (ref 3.8–5.2)
RBC # BLD AUTO: 3.28 10E6/UL (ref 3.8–5.2)
RBC # BLD AUTO: 3.52 10E6/UL (ref 3.8–5.2)
RBC # BLD AUTO: 3.64 10E6/UL (ref 3.8–5.2)
RBC MORPH BLD: ABNORMAL
RBC URINE: 1 /HPF
RBC URINE: 8 /HPF
RETICS # AUTO: 0.04 10E6/UL (ref 0.01–0.11)
RETICS/RBC NFR AUTO: 1.1 % (ref 0.8–2.7)
SAO2 % BLDV: 27.9 % (ref 70–75)
SAO2 % BLDV: 39.8 % (ref 70–75)
SARS-COV-2 RNA RESP QL NAA+PROBE: NEGATIVE
SARS-COV-2 RNA RESP QL NAA+PROBE: NEGATIVE
SARS-COV-2 RNA RESP QL NAA+PROBE: POSITIVE
SARS-COV-2 RNA RESP QL NAA+PROBE: POSITIVE
SODIUM SERPL-SCNC: 138 MMOL/L (ref 136–145)
SODIUM SERPL-SCNC: 138 MMOL/L (ref 136–145)
SODIUM SERPL-SCNC: 141 MMOL/L (ref 136–145)
SODIUM SERPL-SCNC: 141 MMOL/L (ref 136–145)
SODIUM SERPL-SCNC: 143 MMOL/L (ref 136–145)
SODIUM SERPL-SCNC: 143 MMOL/L (ref 136–145)
SODIUM SERPL-SCNC: 144 MMOL/L (ref 136–145)
SODIUM SERPL-SCNC: 146 MMOL/L (ref 136–145)
SODIUM SERPL-SCNC: 155 MMOL/L (ref 136–145)
SP GR UR STRIP: 1.02 (ref 1–1.03)
SP GR UR STRIP: 1.02 (ref 1–1.03)
SQUAMOUS EPITHELIAL: <1 /HPF
SYSTOLIC BLOOD PRESSURE - MUSE: 133 MMHG
SYSTOLIC BLOOD PRESSURE - MUSE: NORMAL MMHG
T AXIS - MUSE: 20 DEGREES
T AXIS - MUSE: 76 DEGREES
TROPONIN I SERPL-MCNC: 0.06 NG/ML (ref 0–0.29)
TROPONIN I SERPL-MCNC: <0.01 NG/ML (ref 0–0.29)
TSH SERPL DL<=0.005 MIU/L-ACNC: 1.31 UIU/ML (ref 0.3–5)
UROBILINOGEN UR STRIP-MCNC: <2 MG/DL
UROBILINOGEN UR STRIP-MCNC: <2 MG/DL
VALPROATE SERPL-MCNC: 18.1 UG/ML
VALPROATE SERPL-MCNC: 41.4 UG/ML
VENTRICULAR RATE- MUSE: 77 BPM
VENTRICULAR RATE- MUSE: 99 BPM
VIT B12 SERPL-MCNC: 567 PG/ML (ref 213–816)
WBC # BLD AUTO: 10.3 10E3/UL (ref 4–11)
WBC # BLD AUTO: 12 10E3/UL (ref 4–11)
WBC # BLD AUTO: 32.6 10E3/UL (ref 4–11)
WBC # BLD AUTO: 7.1 10E3/UL (ref 4–11)
WBC # BLD AUTO: 7.3 10E3/UL (ref 4–11)
WBC # BLD AUTO: 7.7 10E3/UL (ref 4–11)
WBC # BLD AUTO: 8.1 10E3/UL (ref 4–11)
WBC # BLD AUTO: 8.2 10E3/UL (ref 4–11)
WBC # BLD AUTO: 8.9 10E3/UL (ref 4–11)
WBC # BLD AUTO: 9.3 10E3/UL (ref 4–11)
WBC # BLD AUTO: 9.6 10E3/UL (ref 4–11)
WBC # BLD AUTO: 9.6 10E3/UL (ref 4–11)
WBC URINE: 1 /HPF
WBC URINE: 5 /HPF

## 2022-01-01 PROCEDURE — 82962 GLUCOSE BLOOD TEST: CPT

## 2022-01-01 PROCEDURE — 96367 TX/PROPH/DG ADDL SEQ IV INF: CPT

## 2022-01-01 PROCEDURE — 250N000009 HC RX 250: Performed by: INTERNAL MEDICINE

## 2022-01-01 PROCEDURE — 258N000003 HC RX IP 258 OP 636: Performed by: STUDENT IN AN ORGANIZED HEALTH CARE EDUCATION/TRAINING PROGRAM

## 2022-01-01 PROCEDURE — 80076 HEPATIC FUNCTION PANEL: CPT | Performed by: INTERNAL MEDICINE

## 2022-01-01 PROCEDURE — 83735 ASSAY OF MAGNESIUM: CPT | Performed by: INTERNAL MEDICINE

## 2022-01-01 PROCEDURE — 99233 SBSQ HOSP IP/OBS HIGH 50: CPT | Performed by: INTERNAL MEDICINE

## 2022-01-01 PROCEDURE — 36415 COLL VENOUS BLD VENIPUNCTURE: CPT | Performed by: EMERGENCY MEDICINE

## 2022-01-01 PROCEDURE — 80048 BASIC METABOLIC PNL TOTAL CA: CPT | Performed by: STUDENT IN AN ORGANIZED HEALTH CARE EDUCATION/TRAINING PROGRAM

## 2022-01-01 PROCEDURE — 83735 ASSAY OF MAGNESIUM: CPT | Performed by: EMERGENCY MEDICINE

## 2022-01-01 PROCEDURE — 96375 TX/PRO/DX INJ NEW DRUG ADDON: CPT

## 2022-01-01 PROCEDURE — XW033E5 INTRODUCTION OF REMDESIVIR ANTI-INFECTIVE INTO PERIPHERAL VEIN, PERCUTANEOUS APPROACH, NEW TECHNOLOGY GROUP 5: ICD-10-PCS | Performed by: EMERGENCY MEDICINE

## 2022-01-01 PROCEDURE — 85027 COMPLETE CBC AUTOMATED: CPT | Performed by: HOSPITALIST

## 2022-01-01 PROCEDURE — 250N000011 HC RX IP 250 OP 636: Performed by: EMERGENCY MEDICINE

## 2022-01-01 PROCEDURE — 258N000003 HC RX IP 258 OP 636: Performed by: HOSPITALIST

## 2022-01-01 PROCEDURE — 96361 HYDRATE IV INFUSION ADD-ON: CPT

## 2022-01-01 PROCEDURE — 250N000013 HC RX MED GY IP 250 OP 250 PS 637: Performed by: STUDENT IN AN ORGANIZED HEALTH CARE EDUCATION/TRAINING PROGRAM

## 2022-01-01 PROCEDURE — 95819 EEG AWAKE AND ASLEEP: CPT | Mod: 26 | Performed by: PSYCHIATRY & NEUROLOGY

## 2022-01-01 PROCEDURE — 82607 VITAMIN B-12: CPT | Performed by: INTERNAL MEDICINE

## 2022-01-01 PROCEDURE — 120N000001 HC R&B MED SURG/OB

## 2022-01-01 PROCEDURE — 87086 URINE CULTURE/COLONY COUNT: CPT | Performed by: STUDENT IN AN ORGANIZED HEALTH CARE EDUCATION/TRAINING PROGRAM

## 2022-01-01 PROCEDURE — 36415 COLL VENOUS BLD VENIPUNCTURE: CPT | Performed by: STUDENT IN AN ORGANIZED HEALTH CARE EDUCATION/TRAINING PROGRAM

## 2022-01-01 PROCEDURE — 82805 BLOOD GASES W/O2 SATURATION: CPT | Performed by: EMERGENCY MEDICINE

## 2022-01-01 PROCEDURE — G0378 HOSPITAL OBSERVATION PER HR: HCPCS

## 2022-01-01 PROCEDURE — 250N000011 HC RX IP 250 OP 636: Performed by: STUDENT IN AN ORGANIZED HEALTH CARE EDUCATION/TRAINING PROGRAM

## 2022-01-01 PROCEDURE — 74176 CT ABD & PELVIS W/O CONTRAST: CPT

## 2022-01-01 PROCEDURE — 258N000003 HC RX IP 258 OP 636: Performed by: EMERGENCY MEDICINE

## 2022-01-01 PROCEDURE — 97530 THERAPEUTIC ACTIVITIES: CPT | Mod: GP

## 2022-01-01 PROCEDURE — 99233 SBSQ HOSP IP/OBS HIGH 50: CPT | Performed by: PSYCHIATRY & NEUROLOGY

## 2022-01-01 PROCEDURE — 250N000013 HC RX MED GY IP 250 OP 250 PS 637: Performed by: PSYCHIATRY & NEUROLOGY

## 2022-01-01 PROCEDURE — 99285 EMERGENCY DEPT VISIT HI MDM: CPT | Mod: 25

## 2022-01-01 PROCEDURE — 99232 SBSQ HOSP IP/OBS MODERATE 35: CPT | Performed by: PSYCHIATRY & NEUROLOGY

## 2022-01-01 PROCEDURE — 250N000011 HC RX IP 250 OP 636: Performed by: HOSPITALIST

## 2022-01-01 PROCEDURE — 83036 HEMOGLOBIN GLYCOSYLATED A1C: CPT | Performed by: INTERNAL MEDICINE

## 2022-01-01 PROCEDURE — 72125 CT NECK SPINE W/O DYE: CPT

## 2022-01-01 PROCEDURE — 86140 C-REACTIVE PROTEIN: CPT | Performed by: HOSPITALIST

## 2022-01-01 PROCEDURE — 83540 ASSAY OF IRON: CPT | Performed by: INTERNAL MEDICINE

## 2022-01-01 PROCEDURE — 85027 COMPLETE CBC AUTOMATED: CPT | Performed by: STUDENT IN AN ORGANIZED HEALTH CARE EDUCATION/TRAINING PROGRAM

## 2022-01-01 PROCEDURE — 93005 ELECTROCARDIOGRAM TRACING: CPT | Performed by: EMERGENCY MEDICINE

## 2022-01-01 PROCEDURE — 80053 COMPREHEN METABOLIC PANEL: CPT | Performed by: EMERGENCY MEDICINE

## 2022-01-01 PROCEDURE — C9803 HOPD COVID-19 SPEC COLLECT: HCPCS

## 2022-01-01 PROCEDURE — 96366 THER/PROPH/DIAG IV INF ADDON: CPT

## 2022-01-01 PROCEDURE — 72131 CT LUMBAR SPINE W/O DYE: CPT

## 2022-01-01 PROCEDURE — 84443 ASSAY THYROID STIM HORMONE: CPT | Performed by: EMERGENCY MEDICINE

## 2022-01-01 PROCEDURE — 80165 DIPROPYLACETIC ACID FREE: CPT | Performed by: PSYCHIATRY & NEUROLOGY

## 2022-01-01 PROCEDURE — 80048 BASIC METABOLIC PNL TOTAL CA: CPT | Performed by: HOSPITALIST

## 2022-01-01 PROCEDURE — 250N000011 HC RX IP 250 OP 636: Performed by: INTERNAL MEDICINE

## 2022-01-01 PROCEDURE — 83735 ASSAY OF MAGNESIUM: CPT | Performed by: FAMILY MEDICINE

## 2022-01-01 PROCEDURE — 81001 URINALYSIS AUTO W/SCOPE: CPT | Performed by: EMERGENCY MEDICINE

## 2022-01-01 PROCEDURE — 82565 ASSAY OF CREATININE: CPT | Performed by: STUDENT IN AN ORGANIZED HEALTH CARE EDUCATION/TRAINING PROGRAM

## 2022-01-01 PROCEDURE — 84145 PROCALCITONIN (PCT): CPT | Performed by: EMERGENCY MEDICINE

## 2022-01-01 PROCEDURE — 85730 THROMBOPLASTIN TIME PARTIAL: CPT | Performed by: STUDENT IN AN ORGANIZED HEALTH CARE EDUCATION/TRAINING PROGRAM

## 2022-01-01 PROCEDURE — 36415 COLL VENOUS BLD VENIPUNCTURE: CPT | Performed by: HOSPITALIST

## 2022-01-01 PROCEDURE — 87635 SARS-COV-2 COVID-19 AMP PRB: CPT | Performed by: INTERNAL MEDICINE

## 2022-01-01 PROCEDURE — 80164 ASSAY DIPROPYLACETIC ACD TOT: CPT | Performed by: STUDENT IN AN ORGANIZED HEALTH CARE EDUCATION/TRAINING PROGRAM

## 2022-01-01 PROCEDURE — 97110 THERAPEUTIC EXERCISES: CPT | Mod: GP

## 2022-01-01 PROCEDURE — 92526 ORAL FUNCTION THERAPY: CPT | Mod: GN

## 2022-01-01 PROCEDURE — 93970 EXTREMITY STUDY: CPT

## 2022-01-01 PROCEDURE — 83615 LACTATE (LD) (LDH) ENZYME: CPT | Performed by: INTERNAL MEDICINE

## 2022-01-01 PROCEDURE — 82746 ASSAY OF FOLIC ACID SERUM: CPT | Performed by: INTERNAL MEDICINE

## 2022-01-01 PROCEDURE — 99222 1ST HOSP IP/OBS MODERATE 55: CPT | Mod: 25 | Performed by: PSYCHIATRY & NEUROLOGY

## 2022-01-01 PROCEDURE — 80177 DRUG SCRN QUAN LEVETIRACETAM: CPT | Performed by: PSYCHIATRY & NEUROLOGY

## 2022-01-01 PROCEDURE — 84484 ASSAY OF TROPONIN QUANT: CPT | Performed by: EMERGENCY MEDICINE

## 2022-01-01 PROCEDURE — 99222 1ST HOSP IP/OBS MODERATE 55: CPT | Performed by: INTERNAL MEDICINE

## 2022-01-01 PROCEDURE — 71045 X-RAY EXAM CHEST 1 VIEW: CPT

## 2022-01-01 PROCEDURE — 258N000003 HC RX IP 258 OP 636: Performed by: INTERNAL MEDICINE

## 2022-01-01 PROCEDURE — 85048 AUTOMATED LEUKOCYTE COUNT: CPT | Performed by: EMERGENCY MEDICINE

## 2022-01-01 PROCEDURE — 82140 ASSAY OF AMMONIA: CPT | Performed by: EMERGENCY MEDICINE

## 2022-01-01 PROCEDURE — 250N000013 HC RX MED GY IP 250 OP 250 PS 637: Performed by: HOSPITALIST

## 2022-01-01 PROCEDURE — 99232 SBSQ HOSP IP/OBS MODERATE 35: CPT | Mod: GC | Performed by: MASSAGE THERAPIST

## 2022-01-01 PROCEDURE — 85610 PROTHROMBIN TIME: CPT | Performed by: STUDENT IN AN ORGANIZED HEALTH CARE EDUCATION/TRAINING PROGRAM

## 2022-01-01 PROCEDURE — 96365 THER/PROPH/DIAG IV INF INIT: CPT

## 2022-01-01 PROCEDURE — 84484 ASSAY OF TROPONIN QUANT: CPT | Performed by: STUDENT IN AN ORGANIZED HEALTH CARE EDUCATION/TRAINING PROGRAM

## 2022-01-01 PROCEDURE — 36415 COLL VENOUS BLD VENIPUNCTURE: CPT | Performed by: FAMILY MEDICINE

## 2022-01-01 PROCEDURE — 82306 VITAMIN D 25 HYDROXY: CPT | Performed by: INTERNAL MEDICINE

## 2022-01-01 PROCEDURE — 70450 CT HEAD/BRAIN W/O DYE: CPT

## 2022-01-01 PROCEDURE — 96368 THER/DIAG CONCURRENT INF: CPT

## 2022-01-01 PROCEDURE — 71275 CT ANGIOGRAPHY CHEST: CPT

## 2022-01-01 PROCEDURE — 85025 COMPLETE CBC W/AUTO DIFF WBC: CPT | Performed by: EMERGENCY MEDICINE

## 2022-01-01 PROCEDURE — 99220 PR INITIAL OBSERVATION CARE,LEVEL III: CPT | Performed by: HOSPITALIST

## 2022-01-01 PROCEDURE — 80177 DRUG SCRN QUAN LEVETIRACETAM: CPT | Performed by: STUDENT IN AN ORGANIZED HEALTH CARE EDUCATION/TRAINING PROGRAM

## 2022-01-01 PROCEDURE — 96374 THER/PROPH/DIAG INJ IV PUSH: CPT

## 2022-01-01 PROCEDURE — 99233 SBSQ HOSP IP/OBS HIGH 50: CPT | Mod: GC | Performed by: STUDENT IN AN ORGANIZED HEALTH CARE EDUCATION/TRAINING PROGRAM

## 2022-01-01 PROCEDURE — 258N000001 HC RX 258: Performed by: HOSPITALIST

## 2022-01-01 PROCEDURE — 99238 HOSP IP/OBS DSCHRG MGMT 30/<: CPT | Mod: GC | Performed by: MASSAGE THERAPIST

## 2022-01-01 PROCEDURE — 85379 FIBRIN DEGRADATION QUANT: CPT | Performed by: HOSPITALIST

## 2022-01-01 PROCEDURE — 97162 PT EVAL MOD COMPLEX 30 MIN: CPT | Mod: GP

## 2022-01-01 PROCEDURE — 87040 BLOOD CULTURE FOR BACTERIA: CPT | Performed by: STUDENT IN AN ORGANIZED HEALTH CARE EDUCATION/TRAINING PROGRAM

## 2022-01-01 PROCEDURE — 72128 CT CHEST SPINE W/O DYE: CPT

## 2022-01-01 PROCEDURE — 87040 BLOOD CULTURE FOR BACTERIA: CPT | Performed by: EMERGENCY MEDICINE

## 2022-01-01 PROCEDURE — 36415 COLL VENOUS BLD VENIPUNCTURE: CPT | Performed by: PHYSICIAN ASSISTANT

## 2022-01-01 PROCEDURE — 85007 BL SMEAR W/DIFF WBC COUNT: CPT | Performed by: EMERGENCY MEDICINE

## 2022-01-01 PROCEDURE — 83735 ASSAY OF MAGNESIUM: CPT | Performed by: STUDENT IN AN ORGANIZED HEALTH CARE EDUCATION/TRAINING PROGRAM

## 2022-01-01 PROCEDURE — 85025 COMPLETE CBC W/AUTO DIFF WBC: CPT | Performed by: STUDENT IN AN ORGANIZED HEALTH CARE EDUCATION/TRAINING PROGRAM

## 2022-01-01 PROCEDURE — 99239 HOSP IP/OBS DSCHRG MGMT >30: CPT | Performed by: HOSPITALIST

## 2022-01-01 PROCEDURE — 87186 SC STD MICRODIL/AGAR DIL: CPT | Performed by: EMERGENCY MEDICINE

## 2022-01-01 PROCEDURE — 92610 EVALUATE SWALLOWING FUNCTION: CPT | Mod: GN

## 2022-01-01 PROCEDURE — 82310 ASSAY OF CALCIUM: CPT | Performed by: HOSPITALIST

## 2022-01-01 PROCEDURE — 87635 SARS-COV-2 COVID-19 AMP PRB: CPT | Performed by: EMERGENCY MEDICINE

## 2022-01-01 PROCEDURE — 83605 ASSAY OF LACTIC ACID: CPT | Performed by: EMERGENCY MEDICINE

## 2022-01-01 PROCEDURE — 85004 AUTOMATED DIFF WBC COUNT: CPT | Performed by: PHYSICIAN ASSISTANT

## 2022-01-01 PROCEDURE — 80164 ASSAY DIPROPYLACETIC ACD TOT: CPT | Performed by: EMERGENCY MEDICINE

## 2022-01-01 PROCEDURE — 85045 AUTOMATED RETICULOCYTE COUNT: CPT | Performed by: STUDENT IN AN ORGANIZED HEALTH CARE EDUCATION/TRAINING PROGRAM

## 2022-01-01 PROCEDURE — 250N000012 HC RX MED GY IP 250 OP 636 PS 637: Performed by: HOSPITALIST

## 2022-01-01 PROCEDURE — 84132 ASSAY OF SERUM POTASSIUM: CPT | Performed by: STUDENT IN AN ORGANIZED HEALTH CARE EDUCATION/TRAINING PROGRAM

## 2022-01-01 PROCEDURE — 93005 ELECTROCARDIOGRAM TRACING: CPT | Performed by: STUDENT IN AN ORGANIZED HEALTH CARE EDUCATION/TRAINING PROGRAM

## 2022-01-01 PROCEDURE — 85610 PROTHROMBIN TIME: CPT | Performed by: EMERGENCY MEDICINE

## 2022-01-01 PROCEDURE — 85014 HEMATOCRIT: CPT | Performed by: STUDENT IN AN ORGANIZED HEALTH CARE EDUCATION/TRAINING PROGRAM

## 2022-01-01 PROCEDURE — 87636 SARSCOV2 & INF A&B AMP PRB: CPT | Performed by: EMERGENCY MEDICINE

## 2022-01-01 PROCEDURE — 83690 ASSAY OF LIPASE: CPT | Performed by: EMERGENCY MEDICINE

## 2022-01-01 PROCEDURE — 95819 EEG AWAKE AND ASLEEP: CPT

## 2022-01-01 PROCEDURE — 99223 1ST HOSP IP/OBS HIGH 75: CPT | Mod: AI | Performed by: STUDENT IN AN ORGANIZED HEALTH CARE EDUCATION/TRAINING PROGRAM

## 2022-01-01 PROCEDURE — 85060 BLOOD SMEAR INTERPRETATION: CPT | Performed by: PATHOLOGY

## 2022-01-01 PROCEDURE — 82040 ASSAY OF SERUM ALBUMIN: CPT | Performed by: EMERGENCY MEDICINE

## 2022-01-01 PROCEDURE — 99232 SBSQ HOSP IP/OBS MODERATE 35: CPT | Mod: GC | Performed by: STUDENT IN AN ORGANIZED HEALTH CARE EDUCATION/TRAINING PROGRAM

## 2022-01-01 PROCEDURE — 96376 TX/PRO/DX INJ SAME DRUG ADON: CPT

## 2022-01-01 PROCEDURE — 80048 BASIC METABOLIC PNL TOTAL CA: CPT | Performed by: FAMILY MEDICINE

## 2022-01-01 RX ORDER — NALOXONE HYDROCHLORIDE 0.4 MG/ML
0.2 INJECTION, SOLUTION INTRAMUSCULAR; INTRAVENOUS; SUBCUTANEOUS
Status: DISCONTINUED | OUTPATIENT
Start: 2022-01-01 | End: 2022-01-01

## 2022-01-01 RX ORDER — NICOTINE POLACRILEX 4 MG
15-30 LOZENGE BUCCAL
Status: DISCONTINUED | OUTPATIENT
Start: 2022-01-01 | End: 2022-01-01 | Stop reason: HOSPADM

## 2022-01-01 RX ORDER — DIVALPROEX SODIUM 500 MG/1
500 TABLET, DELAYED RELEASE ORAL 2 TIMES DAILY
Status: DISCONTINUED | OUTPATIENT
Start: 2022-01-01 | End: 2022-01-01

## 2022-01-01 RX ORDER — HYDROMORPHONE HYDROCHLORIDE 1 MG/ML
1-2 SOLUTION ORAL
Status: DISCONTINUED | OUTPATIENT
Start: 2022-01-01 | End: 2022-01-01 | Stop reason: HOSPADM

## 2022-01-01 RX ORDER — GLYCOPYRROLATE 0.2 MG/ML
0.2 INJECTION, SOLUTION INTRAMUSCULAR; INTRAVENOUS EVERY 4 HOURS PRN
Status: DISCONTINUED | OUTPATIENT
Start: 2022-01-01 | End: 2022-01-01 | Stop reason: HOSPADM

## 2022-01-01 RX ORDER — MAGNESIUM SULFATE HEPTAHYDRATE 40 MG/ML
2 INJECTION, SOLUTION INTRAVENOUS ONCE
Status: COMPLETED | OUTPATIENT
Start: 2022-01-01 | End: 2022-01-01

## 2022-01-01 RX ORDER — OLANZAPINE 5 MG/1
5 TABLET, ORALLY DISINTEGRATING ORAL EVERY 6 HOURS PRN
Status: DISCONTINUED | OUTPATIENT
Start: 2022-01-01 | End: 2022-01-01

## 2022-01-01 RX ORDER — ATROPINE SULFATE 10 MG/ML
2 SOLUTION/ DROPS OPHTHALMIC EVERY 4 HOURS PRN
Status: DISCONTINUED | OUTPATIENT
Start: 2022-01-01 | End: 2022-01-01

## 2022-01-01 RX ORDER — AMOXICILLIN 250 MG
2 CAPSULE ORAL 2 TIMES DAILY PRN
Status: DISCONTINUED | OUTPATIENT
Start: 2022-01-01 | End: 2022-01-01 | Stop reason: HOSPADM

## 2022-01-01 RX ORDER — LIDOCAINE 40 MG/G
CREAM TOPICAL
Status: DISCONTINUED | OUTPATIENT
Start: 2022-01-01 | End: 2022-01-01

## 2022-01-01 RX ORDER — BISACODYL 5 MG
10 TABLET, DELAYED RELEASE (ENTERIC COATED) ORAL DAILY PRN
Status: DISCONTINUED | OUTPATIENT
Start: 2022-01-01 | End: 2022-01-01 | Stop reason: HOSPADM

## 2022-01-01 RX ORDER — DIVALPROEX SODIUM 250 MG/1
250 TABLET, DELAYED RELEASE ORAL 2 TIMES DAILY
Status: DISCONTINUED | OUTPATIENT
Start: 2022-01-01 | End: 2022-01-01 | Stop reason: ALTCHOICE

## 2022-01-01 RX ORDER — MINERAL OIL/HYDROPHIL PETROLAT
OINTMENT (GRAM) TOPICAL
Status: DISCONTINUED | OUTPATIENT
Start: 2022-01-01 | End: 2022-01-01 | Stop reason: HOSPADM

## 2022-01-01 RX ORDER — MAGNESIUM SULFATE HEPTAHYDRATE 40 MG/ML
2 INJECTION, SOLUTION INTRAVENOUS ONCE
Status: DISCONTINUED | OUTPATIENT
Start: 2022-01-01 | End: 2022-01-01 | Stop reason: HOSPADM

## 2022-01-01 RX ORDER — IOPAMIDOL 755 MG/ML
75 INJECTION, SOLUTION INTRAVASCULAR ONCE
Status: COMPLETED | OUTPATIENT
Start: 2022-01-01 | End: 2022-01-01

## 2022-01-01 RX ORDER — BISACODYL 5 MG
5 TABLET, DELAYED RELEASE (ENTERIC COATED) ORAL DAILY PRN
Status: DISCONTINUED | OUTPATIENT
Start: 2022-01-01 | End: 2022-01-01 | Stop reason: HOSPADM

## 2022-01-01 RX ORDER — DEXAMETHASONE SODIUM PHOSPHATE 10 MG/ML
6 INJECTION, SOLUTION INTRAMUSCULAR; INTRAVENOUS ONCE
Status: COMPLETED | OUTPATIENT
Start: 2022-01-01 | End: 2022-01-01

## 2022-01-01 RX ORDER — SODIUM CHLORIDE 9 MG/ML
1000 INJECTION, SOLUTION INTRAVENOUS CONTINUOUS
Status: DISCONTINUED | OUTPATIENT
Start: 2022-01-01 | End: 2022-01-01

## 2022-01-01 RX ORDER — ONDANSETRON 2 MG/ML
4 INJECTION INTRAMUSCULAR; INTRAVENOUS EVERY 6 HOURS PRN
Status: DISCONTINUED | OUTPATIENT
Start: 2022-01-01 | End: 2022-01-01 | Stop reason: HOSPADM

## 2022-01-01 RX ORDER — ALENDRONATE SODIUM 70 MG/1
70 TABLET ORAL
Status: ON HOLD | COMMUNITY
End: 2022-01-01

## 2022-01-01 RX ORDER — NALOXONE HYDROCHLORIDE 0.4 MG/ML
0.2 INJECTION, SOLUTION INTRAMUSCULAR; INTRAVENOUS; SUBCUTANEOUS
Status: DISCONTINUED | OUTPATIENT
Start: 2022-01-01 | End: 2022-01-01 | Stop reason: HOSPADM

## 2022-01-01 RX ORDER — ALENDRONATE SODIUM 70 MG/1
70 TABLET ORAL
COMMUNITY

## 2022-01-01 RX ORDER — MAGNESIUM OXIDE 400 MG/1
400 TABLET ORAL ONCE
Status: DISCONTINUED | OUTPATIENT
Start: 2022-01-01 | End: 2022-01-01

## 2022-01-01 RX ORDER — GLYCOPYRROLATE 1 MG/1
2 TABLET ORAL EVERY 4 HOURS PRN
Status: DISCONTINUED | OUTPATIENT
Start: 2022-01-01 | End: 2022-01-01 | Stop reason: HOSPADM

## 2022-01-01 RX ORDER — PROCHLORPERAZINE 25 MG
12.5 SUPPOSITORY, RECTAL RECTAL EVERY 12 HOURS PRN
Status: DISCONTINUED | OUTPATIENT
Start: 2022-01-01 | End: 2022-01-01 | Stop reason: HOSPADM

## 2022-01-01 RX ORDER — ONDANSETRON 2 MG/ML
4 INJECTION INTRAMUSCULAR; INTRAVENOUS ONCE
Status: COMPLETED | OUTPATIENT
Start: 2022-01-01 | End: 2022-01-01

## 2022-01-01 RX ORDER — CEFTRIAXONE 1 G/1
1 INJECTION, POWDER, FOR SOLUTION INTRAMUSCULAR; INTRAVENOUS EVERY 24 HOURS
Status: DISCONTINUED | OUTPATIENT
Start: 2022-01-01 | End: 2022-01-01 | Stop reason: HOSPADM

## 2022-01-01 RX ORDER — NOREPINEPHRINE BITARTRATE 0.02 MG/ML
.01-.6 INJECTION, SOLUTION INTRAVENOUS CONTINUOUS
Status: DISCONTINUED | OUTPATIENT
Start: 2022-01-01 | End: 2022-01-01

## 2022-01-01 RX ORDER — NALOXONE HYDROCHLORIDE 0.4 MG/ML
0.1 INJECTION, SOLUTION INTRAMUSCULAR; INTRAVENOUS; SUBCUTANEOUS
Status: DISCONTINUED | OUTPATIENT
Start: 2022-01-01 | End: 2022-01-01 | Stop reason: HOSPADM

## 2022-01-01 RX ORDER — LEVETIRACETAM 250 MG/1
250 TABLET ORAL 2 TIMES DAILY
Status: DISCONTINUED | OUTPATIENT
Start: 2022-01-01 | End: 2022-01-01

## 2022-01-01 RX ORDER — PROCHLORPERAZINE 25 MG
12.5 SUPPOSITORY, RECTAL RECTAL EVERY 12 HOURS PRN
Status: DISCONTINUED | OUTPATIENT
Start: 2022-01-01 | End: 2022-01-01

## 2022-01-01 RX ORDER — POLYETHYLENE GLYCOL 3350 17 G/17G
17 POWDER, FOR SOLUTION ORAL DAILY
Status: DISCONTINUED | OUTPATIENT
Start: 2022-01-01 | End: 2022-01-01 | Stop reason: HOSPADM

## 2022-01-01 RX ORDER — LISINOPRIL 2.5 MG/1
2.5 TABLET ORAL DAILY
Status: DISCONTINUED | OUTPATIENT
Start: 2022-01-01 | End: 2022-01-01 | Stop reason: HOSPADM

## 2022-01-01 RX ORDER — LEVETIRACETAM 250 MG/1
250 TABLET ORAL 2 TIMES DAILY
Status: DISCONTINUED | OUTPATIENT
Start: 2022-01-01 | End: 2022-01-01 | Stop reason: HOSPADM

## 2022-01-01 RX ORDER — ONDANSETRON 2 MG/ML
4 INJECTION INTRAMUSCULAR; INTRAVENOUS EVERY 6 HOURS PRN
Status: DISCONTINUED | OUTPATIENT
Start: 2022-01-01 | End: 2022-01-01

## 2022-01-01 RX ORDER — CARBOXYMETHYLCELLULOSE SODIUM 5 MG/ML
1-2 SOLUTION/ DROPS OPHTHALMIC
Status: DISCONTINUED | OUTPATIENT
Start: 2022-01-01 | End: 2022-01-01 | Stop reason: HOSPADM

## 2022-01-01 RX ORDER — CARBOXYMETHYLCELLULOSE SODIUM 5 MG/ML
1-2 SOLUTION/ DROPS OPHTHALMIC
Status: DISCONTINUED | OUTPATIENT
Start: 2022-01-01 | End: 2022-01-01

## 2022-01-01 RX ORDER — ACETAMINOPHEN 650 MG/1
650 SUPPOSITORY RECTAL EVERY 6 HOURS PRN
Status: DISCONTINUED | OUTPATIENT
Start: 2022-01-01 | End: 2022-01-01 | Stop reason: HOSPADM

## 2022-01-01 RX ORDER — LEVETIRACETAM 500 MG/1
500 TABLET ORAL 2 TIMES DAILY
Status: DISCONTINUED | OUTPATIENT
Start: 2022-01-01 | End: 2022-01-01 | Stop reason: HOSPADM

## 2022-01-01 RX ORDER — DEXTROSE MONOHYDRATE, SODIUM CHLORIDE, AND POTASSIUM CHLORIDE 50; 1.49; 4.5 G/1000ML; G/1000ML; G/1000ML
INJECTION, SOLUTION INTRAVENOUS CONTINUOUS
Status: DISCONTINUED | OUTPATIENT
Start: 2022-01-01 | End: 2022-01-01

## 2022-01-01 RX ORDER — HALOPERIDOL 2 MG/ML
1 SOLUTION ORAL
Status: DISCONTINUED | OUTPATIENT
Start: 2022-01-01 | End: 2022-01-01 | Stop reason: HOSPADM

## 2022-01-01 RX ORDER — LISINOPRIL 2.5 MG/1
2.5 TABLET ORAL DAILY
COMMUNITY

## 2022-01-01 RX ORDER — POLYETHYLENE GLYCOL 3350 17 G/17G
17 POWDER, FOR SOLUTION ORAL DAILY PRN
Status: DISCONTINUED | OUTPATIENT
Start: 2022-01-01 | End: 2022-01-01 | Stop reason: HOSPADM

## 2022-01-01 RX ORDER — LORAZEPAM 2 MG/ML
1 INJECTION INTRAMUSCULAR
Status: DISCONTINUED | OUTPATIENT
Start: 2022-01-01 | End: 2022-01-01 | Stop reason: HOSPADM

## 2022-01-01 RX ORDER — DEXTROSE MONOHYDRATE 25 G/50ML
25-50 INJECTION, SOLUTION INTRAVENOUS
Status: DISCONTINUED | OUTPATIENT
Start: 2022-01-01 | End: 2022-01-01 | Stop reason: HOSPADM

## 2022-01-01 RX ORDER — BISACODYL 10 MG
10 SUPPOSITORY, RECTAL RECTAL DAILY PRN
Status: DISCONTINUED | OUTPATIENT
Start: 2022-01-01 | End: 2022-01-01 | Stop reason: HOSPADM

## 2022-01-01 RX ORDER — DIVALPROEX SODIUM 250 MG/1
250 TABLET, DELAYED RELEASE ORAL 2 TIMES DAILY
Status: DISCONTINUED | OUTPATIENT
Start: 2022-01-01 | End: 2022-01-01

## 2022-01-01 RX ORDER — LEVETIRACETAM 250 MG/1
250 TABLET ORAL 2 TIMES DAILY
COMMUNITY
End: 2022-01-01

## 2022-01-01 RX ORDER — HYDROMORPHONE HYDROCHLORIDE 1 MG/ML
1-2 SOLUTION ORAL
Status: DISCONTINUED | OUTPATIENT
Start: 2022-01-01 | End: 2022-01-01

## 2022-01-01 RX ORDER — DIVALPROEX SODIUM 125 MG/1
500 CAPSULE, COATED PELLETS ORAL EVERY 12 HOURS SCHEDULED
Status: DISCONTINUED | OUTPATIENT
Start: 2022-01-01 | End: 2022-01-01 | Stop reason: HOSPADM

## 2022-01-01 RX ORDER — LIDOCAINE HYDROCHLORIDE 20 MG/ML
5 JELLY TOPICAL
Status: DISCONTINUED | OUTPATIENT
Start: 2022-01-01 | End: 2022-01-01 | Stop reason: HOSPADM

## 2022-01-01 RX ORDER — SIMVASTATIN 10 MG
10 TABLET ORAL AT BEDTIME
COMMUNITY

## 2022-01-01 RX ORDER — BISACODYL 10 MG
10 SUPPOSITORY, RECTAL RECTAL
Status: DISCONTINUED | OUTPATIENT
Start: 2022-07-22 | End: 2022-01-01 | Stop reason: HOSPADM

## 2022-01-01 RX ORDER — LIDOCAINE 40 MG/G
CREAM TOPICAL
Status: DISCONTINUED | OUTPATIENT
Start: 2022-01-01 | End: 2022-01-01 | Stop reason: HOSPADM

## 2022-01-01 RX ORDER — ONDANSETRON 4 MG/1
4 TABLET, ORALLY DISINTEGRATING ORAL EVERY 6 HOURS PRN
Status: DISCONTINUED | OUTPATIENT
Start: 2022-01-01 | End: 2022-01-01 | Stop reason: HOSPADM

## 2022-01-01 RX ORDER — DEXAMETHASONE SODIUM PHOSPHATE 10 MG/ML
10 INJECTION, SOLUTION INTRAMUSCULAR; INTRAVENOUS ONCE
Status: DISCONTINUED | OUTPATIENT
Start: 2022-01-01 | End: 2022-01-01

## 2022-01-01 RX ORDER — LEVETIRACETAM 250 MG/1
250 TABLET ORAL 2 TIMES DAILY
Status: ON HOLD | COMMUNITY
End: 2022-01-01

## 2022-01-01 RX ORDER — LISINOPRIL 2.5 MG/1
2.5 TABLET ORAL DAILY
COMMUNITY
End: 2022-01-01

## 2022-01-01 RX ORDER — LEVETIRACETAM 500 MG/1
500 TABLET ORAL 2 TIMES DAILY
Qty: 60 TABLET | Refills: 0 | Status: SHIPPED | OUTPATIENT
Start: 2022-01-01 | End: 2022-07-30

## 2022-01-01 RX ORDER — ENOXAPARIN SODIUM 100 MG/ML
30 INJECTION SUBCUTANEOUS EVERY 24 HOURS
Status: DISCONTINUED | OUTPATIENT
Start: 2022-01-01 | End: 2022-01-01 | Stop reason: HOSPADM

## 2022-01-01 RX ORDER — ACETAMINOPHEN 325 MG/1
650 TABLET ORAL EVERY 4 HOURS PRN
Status: DISCONTINUED | OUTPATIENT
Start: 2022-01-01 | End: 2022-01-01 | Stop reason: HOSPADM

## 2022-01-01 RX ORDER — CEFTRIAXONE 1 G/1
1 INJECTION, POWDER, FOR SOLUTION INTRAMUSCULAR; INTRAVENOUS ONCE
Status: COMPLETED | OUTPATIENT
Start: 2022-01-01 | End: 2022-01-01

## 2022-01-01 RX ORDER — PROCHLORPERAZINE MALEATE 5 MG
5 TABLET ORAL EVERY 6 HOURS PRN
Status: DISCONTINUED | OUTPATIENT
Start: 2022-01-01 | End: 2022-01-01

## 2022-01-01 RX ORDER — LORAZEPAM 0.5 MG/1
1 TABLET ORAL
Status: DISCONTINUED | OUTPATIENT
Start: 2022-01-01 | End: 2022-01-01

## 2022-01-01 RX ORDER — PIPERACILLIN SODIUM, TAZOBACTAM SODIUM 3; .375 G/15ML; G/15ML
3.38 INJECTION, POWDER, LYOPHILIZED, FOR SOLUTION INTRAVENOUS ONCE
Status: COMPLETED | OUTPATIENT
Start: 2022-01-01 | End: 2022-01-01

## 2022-01-01 RX ORDER — LORAZEPAM 2 MG/ML
1 INJECTION INTRAMUSCULAR
Status: DISCONTINUED | OUTPATIENT
Start: 2022-01-01 | End: 2022-01-01

## 2022-01-01 RX ORDER — PROCHLORPERAZINE MALEATE 5 MG
5 TABLET ORAL EVERY 6 HOURS PRN
Status: DISCONTINUED | OUTPATIENT
Start: 2022-01-01 | End: 2022-01-01 | Stop reason: HOSPADM

## 2022-01-01 RX ORDER — DIVALPROEX SODIUM 125 MG/1
500 CAPSULE, COATED PELLETS ORAL EVERY 12 HOURS
Qty: 240 CAPSULE | Refills: 0 | Status: SHIPPED | OUTPATIENT
Start: 2022-01-01

## 2022-01-01 RX ORDER — SODIUM CHLORIDE AND POTASSIUM CHLORIDE 150; 900 MG/100ML; MG/100ML
INJECTION, SOLUTION INTRAVENOUS CONTINUOUS
Status: DISCONTINUED | OUTPATIENT
Start: 2022-01-01 | End: 2022-01-01

## 2022-01-01 RX ORDER — ACETAMINOPHEN 325 MG/1
650 TABLET ORAL EVERY 6 HOURS PRN
Status: DISCONTINUED | OUTPATIENT
Start: 2022-01-01 | End: 2022-01-01 | Stop reason: HOSPADM

## 2022-01-01 RX ORDER — LORAZEPAM 2 MG/ML
1 INJECTION INTRAMUSCULAR ONCE
Status: COMPLETED | OUTPATIENT
Start: 2022-01-01 | End: 2022-01-01

## 2022-01-01 RX ORDER — OLANZAPINE 5 MG/1
5 TABLET, ORALLY DISINTEGRATING ORAL EVERY 6 HOURS PRN
Status: DISCONTINUED | OUTPATIENT
Start: 2022-01-01 | End: 2022-01-01 | Stop reason: HOSPADM

## 2022-01-01 RX ORDER — PANTOPRAZOLE SODIUM 40 MG/1
40 TABLET, DELAYED RELEASE ORAL
Status: DISCONTINUED | OUTPATIENT
Start: 2022-01-01 | End: 2022-01-01 | Stop reason: HOSPADM

## 2022-01-01 RX ORDER — LORAZEPAM 0.5 MG/1
1 TABLET ORAL
Status: DISCONTINUED | OUTPATIENT
Start: 2022-01-01 | End: 2022-01-01 | Stop reason: HOSPADM

## 2022-01-01 RX ORDER — AMOXICILLIN 250 MG
1 CAPSULE ORAL 2 TIMES DAILY PRN
Status: DISCONTINUED | OUTPATIENT
Start: 2022-01-01 | End: 2022-01-01 | Stop reason: HOSPADM

## 2022-01-01 RX ORDER — SALIVA STIMULANT COMB. NO.3
2 SPRAY, NON-AEROSOL (ML) MUCOUS MEMBRANE
Status: DISCONTINUED | OUTPATIENT
Start: 2022-01-01 | End: 2022-01-01 | Stop reason: HOSPADM

## 2022-01-01 RX ORDER — DIVALPROEX SODIUM 250 MG/1
250 TABLET, DELAYED RELEASE ORAL 2 TIMES DAILY
Status: ON HOLD | COMMUNITY
End: 2022-01-01

## 2022-01-01 RX ORDER — DIVALPROEX SODIUM 250 MG/1
250 TABLET, DELAYED RELEASE ORAL 2 TIMES DAILY
COMMUNITY
End: 2022-01-01

## 2022-01-01 RX ORDER — PIPERACILLIN SODIUM, TAZOBACTAM SODIUM 3; .375 G/15ML; G/15ML
3.38 INJECTION, POWDER, LYOPHILIZED, FOR SOLUTION INTRAVENOUS ONCE
Status: DISCONTINUED | OUTPATIENT
Start: 2022-01-01 | End: 2022-01-01

## 2022-01-01 RX ORDER — SIMVASTATIN 10 MG
10 TABLET ORAL AT BEDTIME
Status: DISCONTINUED | OUTPATIENT
Start: 2022-01-01 | End: 2022-01-01 | Stop reason: HOSPADM

## 2022-01-01 RX ORDER — ATROPINE SULFATE 10 MG/ML
2 SOLUTION/ DROPS OPHTHALMIC EVERY 4 HOURS PRN
Status: DISCONTINUED | OUTPATIENT
Start: 2022-01-01 | End: 2022-01-01 | Stop reason: HOSPADM

## 2022-01-01 RX ORDER — BISACODYL 10 MG
10 SUPPOSITORY, RECTAL RECTAL
Status: DISCONTINUED | OUTPATIENT
Start: 2022-07-21 | End: 2022-01-01

## 2022-01-01 RX ORDER — ONDANSETRON 4 MG/1
4 TABLET, ORALLY DISINTEGRATING ORAL EVERY 6 HOURS PRN
Status: DISCONTINUED | OUTPATIENT
Start: 2022-01-01 | End: 2022-01-01

## 2022-01-01 RX ORDER — POTASSIUM CHLORIDE 7.45 MG/ML
10 INJECTION INTRAVENOUS
Status: COMPLETED | OUTPATIENT
Start: 2022-01-01 | End: 2022-01-01

## 2022-01-01 RX ORDER — MINERAL OIL/HYDROPHIL PETROLAT
OINTMENT (GRAM) TOPICAL
Status: DISCONTINUED | OUTPATIENT
Start: 2022-01-01 | End: 2022-01-01

## 2022-01-01 RX ORDER — SODIUM CHLORIDE 9 MG/ML
INJECTION, SOLUTION INTRAVENOUS CONTINUOUS
Status: DISCONTINUED | OUTPATIENT
Start: 2022-01-01 | End: 2022-01-01 | Stop reason: HOSPADM

## 2022-01-01 RX ORDER — NALOXONE HYDROCHLORIDE 0.4 MG/ML
0.1 INJECTION, SOLUTION INTRAMUSCULAR; INTRAVENOUS; SUBCUTANEOUS
Status: DISCONTINUED | OUTPATIENT
Start: 2022-01-01 | End: 2022-01-01

## 2022-01-01 RX ADMIN — LEVETIRACETAM 250 MG: 100 INJECTION, SOLUTION INTRAVENOUS at 00:57

## 2022-01-01 RX ADMIN — CEFTRIAXONE SODIUM 1 G: 1 INJECTION, POWDER, FOR SOLUTION INTRAMUSCULAR; INTRAVENOUS at 22:23

## 2022-01-01 RX ADMIN — SERTRALINE HYDROCHLORIDE 50 MG: 50 TABLET ORAL at 09:08

## 2022-01-01 RX ADMIN — DEXAMETHASONE SODIUM PHOSPHATE 6 MG: 10 INJECTION INTRAMUSCULAR; INTRAVENOUS at 18:44

## 2022-01-01 RX ADMIN — LEVETIRACETAM 250 MG: 250 TABLET, FILM COATED ORAL at 19:32

## 2022-01-01 RX ADMIN — LEVETIRACETAM 500 MG: 500 TABLET ORAL at 20:38

## 2022-01-01 RX ADMIN — SODIUM CHLORIDE 50 ML: 9 INJECTION, SOLUTION INTRAVENOUS at 18:45

## 2022-01-01 RX ADMIN — SODIUM CHLORIDE 250 MG: 9 INJECTION, SOLUTION INTRAVENOUS at 11:56

## 2022-01-01 RX ADMIN — POTASSIUM CHLORIDE, DEXTROSE MONOHYDRATE AND SODIUM CHLORIDE: 150; 5; 450 INJECTION, SOLUTION INTRAVENOUS at 01:14

## 2022-01-01 RX ADMIN — DIVALPROEX SODIUM 500 MG: 125 CAPSULE, COATED PELLETS ORAL at 09:51

## 2022-01-01 RX ADMIN — REMDESIVIR 100 MG: 100 INJECTION, POWDER, LYOPHILIZED, FOR SOLUTION INTRAVENOUS at 20:02

## 2022-01-01 RX ADMIN — POLYETHYLENE GLYCOL 3350 17 GRAM ORAL POWDER PACKET 17 G: at 08:16

## 2022-01-01 RX ADMIN — SERTRALINE HYDROCHLORIDE 50 MG: 50 TABLET ORAL at 09:30

## 2022-01-01 RX ADMIN — DIVALPROEX SODIUM 500 MG: 125 CAPSULE, COATED PELLETS ORAL at 08:15

## 2022-01-01 RX ADMIN — SODIUM CHLORIDE 1000 ML: 9 INJECTION, SOLUTION INTRAVENOUS at 16:34

## 2022-01-01 RX ADMIN — POLYETHYLENE GLYCOL 3350 17 GRAM ORAL POWDER PACKET 17 G: at 12:45

## 2022-01-01 RX ADMIN — POLYETHYLENE GLYCOL 3350 17 GRAM ORAL POWDER PACKET 17 G: at 09:50

## 2022-01-01 RX ADMIN — SERTRALINE HYDROCHLORIDE 50 MG: 50 TABLET ORAL at 09:51

## 2022-01-01 RX ADMIN — POTASSIUM CHLORIDE 10 MEQ: 7.46 INJECTION, SOLUTION INTRAVENOUS at 13:05

## 2022-01-01 RX ADMIN — POTASSIUM CHLORIDE 10 MEQ: 7.46 INJECTION, SOLUTION INTRAVENOUS at 15:13

## 2022-01-01 RX ADMIN — REMDESIVIR 200 MG: 100 INJECTION, POWDER, LYOPHILIZED, FOR SOLUTION INTRAVENOUS at 18:54

## 2022-01-01 RX ADMIN — ENOXAPARIN SODIUM 30 MG: 30 INJECTION SUBCUTANEOUS at 21:19

## 2022-01-01 RX ADMIN — ENOXAPARIN SODIUM 30 MG: 30 INJECTION SUBCUTANEOUS at 21:27

## 2022-01-01 RX ADMIN — LEVETIRACETAM 500 MG: 500 TABLET ORAL at 09:39

## 2022-01-01 RX ADMIN — ACETAMINOPHEN 650 MG: 325 TABLET ORAL at 12:52

## 2022-01-01 RX ADMIN — LISINOPRIL 2.5 MG: 2.5 TABLET ORAL at 08:15

## 2022-01-01 RX ADMIN — SODIUM CHLORIDE 250 MG: 9 INJECTION, SOLUTION INTRAVENOUS at 21:00

## 2022-01-01 RX ADMIN — MAGNESIUM SULFATE IN WATER 2 G: 40 INJECTION, SOLUTION INTRAVENOUS at 08:12

## 2022-01-01 RX ADMIN — LEVETIRACETAM 500 MG: 500 TABLET ORAL at 20:33

## 2022-01-01 RX ADMIN — CEFTRIAXONE SODIUM 1 G: 1 INJECTION, POWDER, FOR SOLUTION INTRAMUSCULAR; INTRAVENOUS at 22:10

## 2022-01-01 RX ADMIN — SERTRALINE HYDROCHLORIDE 50 MG: 50 TABLET ORAL at 08:19

## 2022-01-01 RX ADMIN — LEVETIRACETAM 250 MG: 100 INJECTION, SOLUTION INTRAVENOUS at 12:48

## 2022-01-01 RX ADMIN — LISINOPRIL 2.5 MG: 2.5 TABLET ORAL at 09:39

## 2022-01-01 RX ADMIN — SODIUM CHLORIDE 250 MG: 9 INJECTION, SOLUTION INTRAVENOUS at 21:19

## 2022-01-01 RX ADMIN — MAGNESIUM SULFATE IN WATER 2 G: 40 INJECTION, SOLUTION INTRAVENOUS at 08:15

## 2022-01-01 RX ADMIN — LISINOPRIL 2.5 MG: 2.5 TABLET ORAL at 09:51

## 2022-01-01 RX ADMIN — LEVETIRACETAM 250 MG: 100 INJECTION, SOLUTION INTRAVENOUS at 01:14

## 2022-01-01 RX ADMIN — LEVETIRACETAM 250 MG: 100 INJECTION, SOLUTION INTRAVENOUS at 01:09

## 2022-01-01 RX ADMIN — SODIUM CHLORIDE 250 MG: 9 INJECTION, SOLUTION INTRAVENOUS at 09:30

## 2022-01-01 RX ADMIN — PIPERACILLIN AND TAZOBACTAM 3.38 G: 3; .375 INJECTION, POWDER, LYOPHILIZED, FOR SOLUTION INTRAVENOUS at 16:21

## 2022-01-01 RX ADMIN — DIVALPROEX SODIUM 500 MG: 125 CAPSULE, COATED PELLETS ORAL at 20:38

## 2022-01-01 RX ADMIN — SERTRALINE HYDROCHLORIDE 50 MG: 50 TABLET ORAL at 09:39

## 2022-01-01 RX ADMIN — SODIUM CHLORIDE 250 MG: 9 INJECTION, SOLUTION INTRAVENOUS at 21:27

## 2022-01-01 RX ADMIN — DEXTROSE MONOHYDRATE 25 ML: 25 INJECTION, SOLUTION INTRAVENOUS at 09:29

## 2022-01-01 RX ADMIN — LISINOPRIL 2.5 MG: 2.5 TABLET ORAL at 08:19

## 2022-01-01 RX ADMIN — POLYETHYLENE GLYCOL 3350 17 GRAM ORAL POWDER PACKET 17 G: at 08:19

## 2022-01-01 RX ADMIN — LEVETIRACETAM 500 MG: 500 TABLET ORAL at 09:51

## 2022-01-01 RX ADMIN — DIVALPROEX SODIUM 500 MG: 125 CAPSULE, COATED PELLETS ORAL at 09:39

## 2022-01-01 RX ADMIN — DIVALPROEX SODIUM 500 MG: 125 CAPSULE, COATED PELLETS ORAL at 14:15

## 2022-01-01 RX ADMIN — LEVETIRACETAM 250 MG: 100 INJECTION, SOLUTION INTRAVENOUS at 14:35

## 2022-01-01 RX ADMIN — MAGNESIUM SULFATE IN WATER 2 G: 40 INJECTION, SOLUTION INTRAVENOUS at 19:05

## 2022-01-01 RX ADMIN — SERTRALINE HYDROCHLORIDE 50 MG: 50 TABLET ORAL at 09:17

## 2022-01-01 RX ADMIN — ENOXAPARIN SODIUM 30 MG: 30 INJECTION SUBCUTANEOUS at 21:00

## 2022-01-01 RX ADMIN — DIVALPROEX SODIUM 500 MG: 500 TABLET, DELAYED RELEASE ORAL at 19:33

## 2022-01-01 RX ADMIN — SIMVASTATIN 10 MG: 10 TABLET, FILM COATED ORAL at 20:38

## 2022-01-01 RX ADMIN — SODIUM CHLORIDE 1000 ML: 9 INJECTION, SOLUTION INTRAVENOUS at 09:12

## 2022-01-01 RX ADMIN — POTASSIUM CHLORIDE 10 MEQ: 7.46 INJECTION, SOLUTION INTRAVENOUS at 14:08

## 2022-01-01 RX ADMIN — SODIUM CHLORIDE 50 ML: 9 INJECTION, SOLUTION INTRAVENOUS at 18:58

## 2022-01-01 RX ADMIN — POTASSIUM CHLORIDE, DEXTROSE MONOHYDRATE AND SODIUM CHLORIDE: 150; 5; 450 INJECTION, SOLUTION INTRAVENOUS at 11:35

## 2022-01-01 RX ADMIN — ACETAMINOPHEN 650 MG: 325 TABLET ORAL at 13:50

## 2022-01-01 RX ADMIN — ACETAMINOPHEN 650 MG: 325 TABLET ORAL at 09:07

## 2022-01-01 RX ADMIN — SODIUM CHLORIDE 250 MG: 9 INJECTION, SOLUTION INTRAVENOUS at 08:40

## 2022-01-01 RX ADMIN — LEVETIRACETAM 250 MG: 250 TABLET, FILM COATED ORAL at 09:07

## 2022-01-01 RX ADMIN — LEVETIRACETAM 250 MG: 250 TABLET, FILM COATED ORAL at 08:19

## 2022-01-01 RX ADMIN — DIVALPROEX SODIUM 500 MG: 125 CAPSULE, COATED PELLETS ORAL at 09:07

## 2022-01-01 RX ADMIN — SODIUM CHLORIDE 50 ML: 9 INJECTION, SOLUTION INTRAVENOUS at 20:08

## 2022-01-01 RX ADMIN — SERTRALINE HYDROCHLORIDE 50 MG: 50 TABLET ORAL at 09:07

## 2022-01-01 RX ADMIN — LEVETIRACETAM 250 MG: 100 INJECTION, SOLUTION INTRAVENOUS at 00:49

## 2022-01-01 RX ADMIN — LEVETIRACETAM 500 MG: 500 TABLET ORAL at 20:56

## 2022-01-01 RX ADMIN — MAGNESIUM SULFATE HEPTAHYDRATE 2 G: 40 INJECTION, SOLUTION INTRAVENOUS at 11:36

## 2022-01-01 RX ADMIN — POTASSIUM CHLORIDE AND SODIUM CHLORIDE: 900; 150 INJECTION, SOLUTION INTRAVENOUS at 00:59

## 2022-01-01 RX ADMIN — SODIUM CHLORIDE 1000 ML: 9 INJECTION, SOLUTION INTRAVENOUS at 06:40

## 2022-01-01 RX ADMIN — SIMVASTATIN 10 MG: 10 TABLET, FILM COATED ORAL at 19:32

## 2022-01-01 RX ADMIN — SODIUM CHLORIDE 1000 ML: 9 INJECTION, SOLUTION INTRAVENOUS at 16:49

## 2022-01-01 RX ADMIN — SODIUM CHLORIDE 250 MG: 9 INJECTION, SOLUTION INTRAVENOUS at 09:17

## 2022-01-01 RX ADMIN — LEVETIRACETAM 500 MG: 500 TABLET ORAL at 20:10

## 2022-01-01 RX ADMIN — REMDESIVIR 100 MG: 100 INJECTION, POWDER, LYOPHILIZED, FOR SOLUTION INTRAVENOUS at 18:32

## 2022-01-01 RX ADMIN — SERTRALINE HYDROCHLORIDE 50 MG: 50 TABLET ORAL at 08:15

## 2022-01-01 RX ADMIN — POTASSIUM CHLORIDE 10 MEQ: 7.46 INJECTION, SOLUTION INTRAVENOUS at 11:59

## 2022-01-01 RX ADMIN — SIMVASTATIN 10 MG: 10 TABLET, FILM COATED ORAL at 20:56

## 2022-01-01 RX ADMIN — DIVALPROEX SODIUM 500 MG: 125 CAPSULE, COATED PELLETS ORAL at 20:11

## 2022-01-01 RX ADMIN — POTASSIUM CHLORIDE AND SODIUM CHLORIDE 75 ML/HR: 900; 150 INJECTION, SOLUTION INTRAVENOUS at 10:19

## 2022-01-01 RX ADMIN — LORAZEPAM 1 MG: 2 INJECTION INTRAMUSCULAR; INTRAVENOUS at 21:42

## 2022-01-01 RX ADMIN — LISINOPRIL 2.5 MG: 2.5 TABLET ORAL at 09:07

## 2022-01-01 RX ADMIN — CEFTRIAXONE SODIUM 1 G: 1 INJECTION, POWDER, FOR SOLUTION INTRAMUSCULAR; INTRAVENOUS at 22:17

## 2022-01-01 RX ADMIN — DIVALPROEX SODIUM 500 MG: 125 CAPSULE, COATED PELLETS ORAL at 20:57

## 2022-01-01 RX ADMIN — SIMVASTATIN 10 MG: 10 TABLET, FILM COATED ORAL at 20:10

## 2022-01-01 RX ADMIN — SODIUM CHLORIDE 1000 ML: 9 INJECTION, SOLUTION INTRAVENOUS at 01:01

## 2022-01-01 RX ADMIN — LEVETIRACETAM 250 MG: 100 INJECTION, SOLUTION INTRAVENOUS at 13:41

## 2022-01-01 RX ADMIN — POLYETHYLENE GLYCOL 3350 17 GRAM ORAL POWDER PACKET 17 G: at 09:39

## 2022-01-01 RX ADMIN — MAGNESIUM SULFATE HEPTAHYDRATE 2 G: 40 INJECTION, SOLUTION INTRAVENOUS at 17:53

## 2022-01-01 RX ADMIN — LEVETIRACETAM 500 MG: 500 TABLET ORAL at 08:15

## 2022-01-01 RX ADMIN — CEFTRIAXONE SODIUM 1 G: 1 INJECTION, POWDER, FOR SOLUTION INTRAMUSCULAR; INTRAVENOUS at 10:24

## 2022-01-01 RX ADMIN — SODIUM CHLORIDE 1000 ML: 9 INJECTION, SOLUTION INTRAVENOUS at 20:22

## 2022-01-01 RX ADMIN — LISINOPRIL 2.5 MG: 2.5 TABLET ORAL at 09:08

## 2022-01-01 RX ADMIN — SODIUM CHLORIDE 1000 ML: 9 INJECTION, SOLUTION INTRAVENOUS at 21:52

## 2022-01-01 RX ADMIN — ONDANSETRON 4 MG: 2 INJECTION INTRAMUSCULAR; INTRAVENOUS at 15:48

## 2022-01-01 RX ADMIN — SIMVASTATIN 10 MG: 10 TABLET, FILM COATED ORAL at 20:33

## 2022-01-01 RX ADMIN — POLYETHYLENE GLYCOL 3350 17 GRAM ORAL POWDER PACKET 17 G: at 09:08

## 2022-01-01 RX ADMIN — DIVALPROEX SODIUM 500 MG: 125 CAPSULE, COATED PELLETS ORAL at 20:33

## 2022-01-01 RX ADMIN — LEVETIRACETAM 500 MG: 500 TABLET ORAL at 09:07

## 2022-01-01 RX ADMIN — INSULIN ASPART 1 UNITS: 100 INJECTION, SOLUTION INTRAVENOUS; SUBCUTANEOUS at 14:25

## 2022-01-01 RX ADMIN — REMDESIVIR 100 MG: 100 INJECTION, POWDER, LYOPHILIZED, FOR SOLUTION INTRAVENOUS at 18:57

## 2022-01-01 RX ADMIN — MAGNESIUM SULFATE IN WATER 2 G: 40 INJECTION, SOLUTION INTRAVENOUS at 17:45

## 2022-01-01 RX ADMIN — SODIUM CHLORIDE 1000 ML: 9 INJECTION, SOLUTION INTRAVENOUS at 19:32

## 2022-01-01 RX ADMIN — DIVALPROEX SODIUM 250 MG: 250 TABLET, DELAYED RELEASE ORAL at 09:08

## 2022-01-01 RX ADMIN — SODIUM CHLORIDE 1000 ML: 9 INJECTION, SOLUTION INTRAVENOUS at 16:28

## 2022-01-01 RX ADMIN — IOPAMIDOL 75 ML: 755 INJECTION, SOLUTION INTRAVENOUS at 11:26

## 2022-01-01 RX ADMIN — CEFTRIAXONE SODIUM 1 G: 1 INJECTION, POWDER, FOR SOLUTION INTRAMUSCULAR; INTRAVENOUS at 00:31

## 2022-01-01 ASSESSMENT — ACTIVITIES OF DAILY LIVING (ADL)
ADLS_ACUITY_SCORE: 71
ADLS_ACUITY_SCORE: 29
ADLS_ACUITY_SCORE: 35
WEAR_GLASSES_OR_BLIND: NO
ADLS_ACUITY_SCORE: 67
ADLS_ACUITY_SCORE: 64
ADLS_ACUITY_SCORE: 18
ADLS_ACUITY_SCORE: 29
ADLS_ACUITY_SCORE: 26
ADLS_ACUITY_SCORE: 64
ADLS_ACUITY_SCORE: 35
ADLS_ACUITY_SCORE: 59
ADLS_ACUITY_SCORE: 18
ADLS_ACUITY_SCORE: 29
ADLS_ACUITY_SCORE: 18
TOILETING_ASSISTANCE: TOILETING DIFFICULTY, DEPENDENT
ADLS_ACUITY_SCORE: 73
ADLS_ACUITY_SCORE: 39
ADLS_ACUITY_SCORE: 59
ADLS_ACUITY_SCORE: 27
ADLS_ACUITY_SCORE: 27
ADLS_ACUITY_SCORE: 39
DRESSING/BATHING_DIFFICULTY: YES
ADLS_ACUITY_SCORE: 66
ADLS_ACUITY_SCORE: 18
TOILETING: 2-->COMPLETELY DEPENDENT (NOT DEVELOPMENTALLY APPROPRIATE)
ADLS_ACUITY_SCORE: 64
ADLS_ACUITY_SCORE: 67
ADLS_ACUITY_SCORE: 67
ADLS_ACUITY_SCORE: 27
ADLS_ACUITY_SCORE: 67
ADLS_ACUITY_SCORE: 28
ADLS_ACUITY_SCORE: 27
ADLS_ACUITY_SCORE: 29
ADLS_ACUITY_SCORE: 26
TRANSFERRING: 2-->COMPLETELY DEPENDENT (NOT DEVELOPMENTALLY APPROPRIATE)
ADLS_ACUITY_SCORE: 71
ADLS_ACUITY_SCORE: 64
ADLS_ACUITY_SCORE: 27
ADLS_ACUITY_SCORE: 27
ADLS_ACUITY_SCORE: 59
ADLS_ACUITY_SCORE: 27
ADLS_ACUITY_SCORE: 26
ADLS_ACUITY_SCORE: 27
ADLS_ACUITY_SCORE: 59
ADLS_ACUITY_SCORE: 26
ADLS_ACUITY_SCORE: 29
ADLS_ACUITY_SCORE: 27
TOILETING_ISSUES: YES
ADLS_ACUITY_SCORE: 35
ADLS_ACUITY_SCORE: 73
ADLS_ACUITY_SCORE: 18
DEPENDENT_IADLS:: CLEANING;COOKING;LAUNDRY;SHOPPING;MEAL PREPARATION;MEDICATION MANAGEMENT;MONEY MANAGEMENT;TRANSPORTATION
FALL_HISTORY_WITHIN_LAST_SIX_MONTHS: NO
ADLS_ACUITY_SCORE: 67
BATHING: 2-->COMPLETELY DEPENDENT (NOT DEVELOPMENTALLY APPROPRIATE)
DIFFICULTY_EATING/SWALLOWING: NO
ADLS_ACUITY_SCORE: 27
ADLS_ACUITY_SCORE: 18
ADLS_ACUITY_SCORE: 27
ADLS_ACUITY_SCORE: 67
ADLS_ACUITY_SCORE: 26
ADLS_ACUITY_SCORE: 27
ADLS_ACUITY_SCORE: 26
ADLS_ACUITY_SCORE: 27
CONCENTRATING,_REMEMBERING_OR_MAKING_DECISIONS_DIFFICULTY: YES
DRESS: 2-->COMPLETELY DEPENDENT
ADLS_ACUITY_SCORE: 73
ADLS_ACUITY_SCORE: 29
ADLS_ACUITY_SCORE: 73
ADLS_ACUITY_SCORE: 29
ADLS_ACUITY_SCORE: 67
ADLS_ACUITY_SCORE: 28
ADLS_ACUITY_SCORE: 29
ADLS_ACUITY_SCORE: 27
ADLS_ACUITY_SCORE: 35
ADLS_ACUITY_SCORE: 29
ADLS_ACUITY_SCORE: 22
DEPENDENT_IADLS:: CLEANING;COOKING;LAUNDRY;SHOPPING;MEAL PREPARATION;MEDICATION MANAGEMENT;TRANSPORTATION;MONEY MANAGEMENT
ADLS_ACUITY_SCORE: 27
ADLS_ACUITY_SCORE: 27
ADLS_ACUITY_SCORE: 67
TOILETING: 2-->COMPLETELY DEPENDENT
ADLS_ACUITY_SCORE: 27
ADLS_ACUITY_SCORE: 27
ADLS_ACUITY_SCORE: 26
ADLS_ACUITY_SCORE: 26
ADLS_ACUITY_SCORE: 43
ADLS_ACUITY_SCORE: 59
ADLS_ACUITY_SCORE: 26
ADLS_ACUITY_SCORE: 27
ADLS_ACUITY_SCORE: 29
ADLS_ACUITY_SCORE: 71
ADLS_ACUITY_SCORE: 45
ADLS_ACUITY_SCORE: 35
ADLS_ACUITY_SCORE: 67
ADLS_ACUITY_SCORE: 26
ADLS_ACUITY_SCORE: 71
ADLS_ACUITY_SCORE: 27
ADLS_ACUITY_SCORE: 27
ADLS_ACUITY_SCORE: 26
ADLS_ACUITY_SCORE: 29
ADLS_ACUITY_SCORE: 59
ADLS_ACUITY_SCORE: 27
ADLS_ACUITY_SCORE: 29
ADLS_ACUITY_SCORE: 41
ADLS_ACUITY_SCORE: 26
ADLS_ACUITY_SCORE: 67
ADLS_ACUITY_SCORE: 27
ADLS_ACUITY_SCORE: 26
ADLS_ACUITY_SCORE: 29
ADLS_ACUITY_SCORE: 64
ADLS_ACUITY_SCORE: 29
ADLS_ACUITY_SCORE: 26
ADLS_ACUITY_SCORE: 27
ADLS_ACUITY_SCORE: 67
TRANSFERRING: 2-->COMPLETELY DEPENDENT
ADLS_ACUITY_SCORE: 59
ADLS_ACUITY_SCORE: 35
ADLS_ACUITY_SCORE: 59
WALKING_OR_CLIMBING_STAIRS_DIFFICULTY: YES
WALKING_OR_CLIMBING_STAIRS: TRANSFERRING DIFFICULTY, DEPENDENT
ADLS_ACUITY_SCORE: 64
ADLS_ACUITY_SCORE: 27
ADLS_ACUITY_SCORE: 66
ADLS_ACUITY_SCORE: 26
ADLS_ACUITY_SCORE: 67
ADLS_ACUITY_SCORE: 29
ADLS_ACUITY_SCORE: 26
ADLS_ACUITY_SCORE: 18
ADLS_ACUITY_SCORE: 67
DRESSING/BATHING: DRESSING DIFFICULTY, DEPENDENT
CHANGE_IN_FUNCTIONAL_STATUS_SINCE_ONSET_OF_CURRENT_ILLNESS/INJURY: YES
ADLS_ACUITY_SCORE: 26
ADLS_ACUITY_SCORE: 28
DRESS: 2-->COMPLETELY DEPENDENT (NOT DEVELOPMENTALLY APPROPRIATE)
ADLS_ACUITY_SCORE: 29
ADLS_ACUITY_SCORE: 27
ADLS_ACUITY_SCORE: 73
ADLS_ACUITY_SCORE: 45
DOING_ERRANDS_INDEPENDENTLY_DIFFICULTY: YES
ADLS_ACUITY_SCORE: 26
ADLS_ACUITY_SCORE: 26
ADLS_ACUITY_SCORE: 59
ADLS_ACUITY_SCORE: 64
ADLS_ACUITY_SCORE: 71
ADLS_ACUITY_SCORE: 73
ADLS_ACUITY_SCORE: 59
ADLS_ACUITY_SCORE: 59

## 2022-04-25 PROBLEM — G40.909 SEIZURE DISORDER (H): Status: ACTIVE | Noted: 2022-01-01

## 2022-04-25 PROBLEM — R41.0 DELIRIUM: Status: ACTIVE | Noted: 2022-01-01

## 2022-04-25 PROBLEM — U07.1 INFECTION DUE TO 2019 NOVEL CORONAVIRUS: Status: ACTIVE | Noted: 2022-01-01

## 2022-04-25 PROBLEM — E83.42 HYPOMAGNESEMIA: Status: ACTIVE | Noted: 2022-01-01

## 2022-04-25 NOTE — ED NOTES
Called and spoke with patient's Daughter in Law, Ayo. General weakness and being progressively nonverbal, not getting up for meals or out of bed since last night. There has been no no complaints of pain or any other symptoms. No falls or recent injuries noted.

## 2022-04-25 NOTE — ED NOTES
Bed: JNED-14  Expected date: 4/25/22  Expected time: 2:53 PM  Means of arrival:   Comments:  Spf/alt mental status/ bp 90

## 2022-04-25 NOTE — CONSULTS
"Care Management Initial Consult    General Information  Assessment completed with: Arlyn Valentino daughter via phone  Type of CM/SW Visit: Initial Assessment    Primary Care Provider verified and updated as needed: Yes   Readmission within the last 30 days: no previous admission in last 30 days      Reason for Consult: discharge planning  Advance Care Planning: Advance Care Planning Reviewed: no concerns identified, questions answered (\"she doesn't have one\")          Communication Assessment  Patient's communication style: spoken language (non-English) (speaks Hmong)          Living Environment:   People in home: child(ravin), adult, grandchild(ravin) (\"daughter Arlyn and granddaughter)  Mao, daughter Arlyn, granddaugher  Current living Arrangements: house      Able to return to prior arrangements: yes       Family/Social Support:  Care provided by: child(ravin)  Provides care for: no one, unable/limited ability to care for self     Children, PCA          Description of Support System: Involved, Supportive    Support Assessment: Adequate family and caregiver support, Adequate social supports, Patient communicates needs well met    Current Resources:   Patient receiving home care services: No     Community Resources: PCA (daughter is PCA for 10hr/day)  Equipment currently used at home: walker, rolling  Supplies currently used at home: Other (\"dentures, glasses\")    Employment/Financial:  Employment Status: unemployed        Financial Concerns:     Referral to Financial Worker: No       Lifestyle & Psychosocial Needs:  Social Determinants of Health     Tobacco Use: Low Risk      Smoking Tobacco Use: Never Smoker     Smokeless Tobacco Use: Never Used   Alcohol Use: Not on file   Financial Resource Strain: Not on file   Food Insecurity: Not on file   Transportation Needs: Not on file   Physical Activity: Not on file   Stress: Not on file   Social Connections: Not on file   Intimate Partner Violence: Not on file   Depression: " "Not on file   Housing Stability: Not on file       Functional Status:  Prior to admission patient needed assistance:   Dependent ADLs:: Ambulation-walker, Bathing, Dressing, Grooming, Toileting, Transfers, Positioning  Dependent IADLs:: Cleaning, Cooking, Laundry, Shopping, Meal Preparation, Medication Management, Money Management, Transportation  Assesssment of Functional Status: Not at baseline with ADL Functioning, Not at baseline with mobility, Not at  functional baseline    Mental Health Status:          Chemical Dependency Status:                Values/Beliefs:  Spiritual, Cultural Beliefs, Jain Practices, Values that affect care:                 Additional Information:  Ivan is found to be COVID +. I verified with daughter that she is \"not vaccinated\".    She lives in a house with her daughter Arlyn and granddaughter. Daughter Arlyn is her PCA for 10hr/day for all ADLs.    Unknown discharge needs at this time.    Arlyn daughter [phone].    Jennifer Teran RN      "

## 2022-04-25 NOTE — ED NOTES
Pt does track to my voice, but does not follow commands; may be due to language barrier; earlier attempted assessment with [employer] [place]  but pt did not answer his questions or follow commands through ; pt's left eye is sealed shut with crusty discharge-- not able to assess pupils; skin looks intact and healthy; no edema noted

## 2022-04-25 NOTE — ED PROVIDER NOTES
EMERGENCY DEPARTMENT ENCOUNTER      NAME: Ivan Hook  AGE: 81 year old female  YOB: 1940  MRN: [id number]  EVALUATION DATE & TIME: 4/25/2022  3:02 PM    PCP: Maria G Riojas    ED PROVIDER: Cheri Shen M.D.      Chief Complaint   Patient presents with     Altered Mental Status     Family reports a decrease in loc over the past 18 hrs.         FINAL IMPRESSION:  1. Delirium    2. Hypomagnesemia    3. Infection due to 2019 novel coronavirus          ED COURSE & MEDICAL DECISION MAKING:    ED Course as of 04/25/22 1958 Mon Apr 25, 2022   1533 Pt with AMS without known advance directives, not able to speak at all, normally alert/oriented and ambulatory per family report to EMS but family not contactable, 18h gradual onset weakness with AMS, no known falls or trauma, BP per EMS 90, not on recent abx known to EMS,  en route, IVF underway, BP reassuringly normal here, no visible source of pathology, with patient keeping eyes closed except slight opening to discomfort GCS 13 and trauma alert called with h/o SDH and SAH both. Will check cultures, lactic acid, UA, CBC and TSH with chemistry and ammonia and trauma alert called 3:35 PM to expedite imaging with history of traumatic injuries and AMS   1535 RN Bryon at bedside doing labs and pt to imaging shortly with history and trauma alert   1543 Pt with nausea, vomting small emesis nonbloody nonbillious food contents,given zofran, ct abdomen added. No TTA ordered as no signs on exam of trauma, family had said no trauma to EMS per EMS dropoff per [facility] and GCS 13 but pt being rushed to CT head   1647 UA without UTI, WBC normal, Hemoglobin 10.1 which is improved form her baseline, Magnesium low at 1.5 and repleted, ammonia normal at 21   1648 Pt to CT now, had gone for XR first   1747 Pt COVID19 +. Daughters noted no recent falls, injuries, ROS negaive, just not eating/drinking/getting out of bed since last night.    1753 Dexamethasone  and remdesivir begun   1754 T1 and T2 chronic appearing likely fractures. No pain at those locations when palpated. Hospitalist paged for admission, RN updated re: COVID19 + result   1807 Pt endorsed to hospitalist to [facility] obs       Pertinent Labs & Imaging studies reviewed. (See chart for details)    N95 worn  A face shield was worn also  COVID PPE    3:26 PM I met with the patient to gather history and to perform my initial exam. I discussed the plan for care while in the Emergency Department.   6:05 PM I discussed the case with hospitalist, Dr. Walker, who accepts the patient for observation.     At the conclusion of the encounter I discussed the results of all of the tests and the disposition. The questions were answered. The patient or family acknowledged understanding and was agreeable with the care plan.     MEDICATIONS GIVEN IN THE EMERGENCY:  Medications   0.9% sodium chloride BOLUS (1,000 mLs Intravenous Not Given 4/25/22 1830)   remdesivir 200 mg in sodium chloride 0.9 % 250 mL intermittent infusion (200 mg Intravenous New Bag 4/25/22 1854)     Followed by   0.9% sodium chloride BOLUS (has no administration in time range)   remdesivir 100 mg in sodium chloride 0.9 % 250 mL intermittent infusion (has no administration in time range)     And   0.9% sodium chloride BOLUS (has no administration in time range)   piperacillin-tazobactam (ZOSYN) 3.375 g vial to attach to  mL bag (0 g Intravenous Stopped 4/25/22 1745)   ondansetron (ZOFRAN) injection 4 mg (4 mg Intravenous Given 4/25/22 1548)   magnesium sulfate 2 g in water intermittent infusion (2 g Intravenous New Bag 4/25/22 1745)   dexamethasone PF (DECADRON) injection 6 mg (6 mg Intravenous Given 4/25/22 1844)       NEW PRESCRIPTIONS STARTED AT TODAY'S ER VISIT  New Prescriptions    No medications on file          =================================================================    HPI      Ivan Hook is a 81 year old female with PMHx of HTN, HLD, DM II,  subdural hematoma and subarachnoid hemorrhage, not on blood thinners, who presents to the ED today via EMS with altered mental status.    HPI is limited due to nonverbal status.    Per EMS reports, patient is coming from home. Family reported that since an unknown time last night patient has become progressively weaker and was unable to get up this morning. At baseline patient is A&Ox4 and able to stand up and walk. Per EMS patient was hypotension in the 90s systolic and was started on IV fluids. Here in ED blood pressure is 120s systolic and patient opens her eyes to voice. Blood glucose wast 141 en route.     Hmong  used to ask patient if she is ok, what happened, and if patient is in any pain. Patient only responds with humming.    REVIEW OF SYSTEMS   ROS is limited due to nonverbal status.    PAST MEDICAL HISTORY:  Past Medical History:   Diagnosis Date     Diabetes mellitus (H)      HLD (hyperlipidemia)      Hypertension      Osteoporosis 10/20/2019     Peripheral neuropathy        PAST SURGICAL HISTORY:  Past Surgical History:   Procedure Laterality Date     CHOLECYSTECTOMY         CURRENT MEDICATIONS:    No current outpatient medications on file.      ALLERGIES:  No Known Allergies    FAMILY HISTORY:  No family history on file.    SOCIAL HISTORY:   Social History     Socioeconomic History     Marital status: Single   Tobacco Use     Smoking status: Never Smoker     Smokeless tobacco: Never Used   Substance and Sexual Activity     Alcohol use: No     Drug use: No       VITALS:  Patient Vitals for the past 24 hrs:   BP Temp Temp src Pulse Resp SpO2   04/25/22 1915 -- -- -- 74 15 99 %   04/25/22 1900 133/62 -- -- 75 14 98 %   04/25/22 1845 117/60 -- -- 75 14 98 %   04/25/22 1830 117/55 -- -- 79 17 98 %   04/25/22 1800 124/56 -- -- 73 15 97 %   04/25/22 1700 (!) 162/72 -- -- -- -- --   04/25/22 1630 118/60 -- -- 76 14 98 %   04/25/22 1600 -- -- -- 77 15 96 %   04/25/22 1530 133/76 97.7  F (36.5  C)  Axillary 79 22 98 %   04/25/22 1507 126/65 98.2  F (36.8  C) Oral 78 -- 97 %       PHYSICAL EXAM    GENERAL: Awake, alert.  In no acute distress. GCS 13  HEENT: Normocephalic, atraumatic. Dry, cracked appearing lips. Pupils equal, round and reactive.  Conjunctiva normal.  EOMI. No roe sign, no racoon eyes, no mastoid tenderness, no hemotympanum, no facial instability, no nasal bridge pain, no nasal septal hematoma, no intraoral lacerations, no loose teeth, no mandible pain or deformity  NECK: No stridor or apparent deformity. No midline pain to palpation.  PULMONARY: Symmetrical breath sounds without distress.  Lungs clear to auscultation bilaterally without wheezes, rhonchi or rales.  CARDIO: Regular rate and rhythm.  No significant murmur, rub or gallop.  Radial pulses strong and symmetrical.  THORAX: No focal chest wall deformity or crepitus  BACK: No focal tenderness or deformity to each vertebral level in midline  ABDOMINAL: Abdomen soft, non-distended and no response to deep abdominal palpation. No CVAT, BL.  EXTREMITIES: No lower extremity swelling or edema. Pelvis stable and without focal tenderness. Bilateral pedal pulses 2+ and equal.  NEURO: With Hmong  responds with humming sounds to questions but no word usage. Patient keeps her eyes closed throughout the interview but flutters eyelids during examination. Cranial nerves grossly intact.  No focal motor deficit. Sensation globally intact.  PSYCH: Normal mood and affect  SKIN: No rashes           LAB:  All pertinent labs reviewed and interpreted.  Results for orders placed or performed during the hospital encounter of 04/25/22   XR Chest Port 1 View    Impression    IMPRESSION: Heart size and pulmonary vessels are normal. Lungs are clear.  Old right rib fractures.   CT Head w/o Contrast    Impression    IMPRESSION:  HEAD CT:  1.  No CT evidence for acute intracranial process.  2.  Brain atrophy and presumed chronic microvascular ischemic  changes as above. Unchanged coarse calcification of the left frontal lobe.  3.  Inflammatory changes of the paranasal sinuses. Correlate with clinical evidence for sinusitis.    CERVICAL SPINE CT:  1.  Age-indeterminate superior endplate compression fractures at T1 and T2. Sclerosis of these levels favors subacute to chronic injury, but correlation with upper thoracic point tenderness is advised.  2.  Otherwise, no evidence for acute fracture or posttraumatic subluxation.  3.  Multilevel cervical spondylosis greatest at C3-C4 and C5-C6.   CT Cervical Spine w/o Contrast    Impression    IMPRESSION:  HEAD CT:  1.  No CT evidence for acute intracranial process.  2.  Brain atrophy and presumed chronic microvascular ischemic changes as above. Unchanged coarse calcification of the left frontal lobe.  3.  Inflammatory changes of the paranasal sinuses. Correlate with clinical evidence for sinusitis.    CERVICAL SPINE CT:  1.  Age-indeterminate superior endplate compression fractures at T1 and T2. Sclerosis of these levels favors subacute to chronic injury, but correlation with upper thoracic point tenderness is advised.  2.  Otherwise, no evidence for acute fracture or posttraumatic subluxation.  3.  Multilevel cervical spondylosis greatest at C3-C4 and C5-C6.   CT Abdomen Pelvis w/o Contrast    Impression    IMPRESSION:   1.  No acute process demonstrated in the abdomen and pelvis.  2.  New multilevel compression deformities of the lower thoracic and lumbar spine.  3.  Otherwise stable exam.     Result Value Ref Range    INR 1.29 (H) 0.90 - 1.15   Comprehensive metabolic panel   Result Value Ref Range    Sodium 141 136 - 145 mmol/L    Potassium 3.4 (L) 3.5 - 5.0 mmol/L    Chloride 104 98 - 107 mmol/L    Carbon Dioxide (CO2) 25 22 - 31 mmol/L    Anion Gap 12 5 - 18 mmol/L    Urea Nitrogen 18 8 - 28 mg/dL    Creatinine 0.87 0.60 - 1.10 mg/dL    Calcium 8.7 8.5 - 10.5 mg/dL    Glucose 115 70 - 125 mg/dL    Alkaline  Phosphatase 98 45 - 120 U/L    AST 35 0 - 40 U/L    ALT 11 0 - 45 U/L    Protein Total 7.9 6.0 - 8.0 g/dL    Albumin 3.0 (L) 3.5 - 5.0 g/dL    Bilirubin Total 0.6 0.0 - 1.0 mg/dL    GFR Estimate 67 >60 mL/min/1.73m2   Result Value Ref Range    Lipase 24 0 - 52 U/L   Lactic acid whole blood   Result Value Ref Range    Lactic Acid 1.0 0.7 - 2.0 mmol/L   Result Value Ref Range    Troponin I <0.01 0.00 - 0.29 ng/mL   Result Value Ref Range    Magnesium 1.5 (L) 1.8 - 2.6 mg/dL   TSH with free T4 reflex   Result Value Ref Range    TSH 1.31 0.30 - 5.00 uIU/mL   Blood gas venous   Result Value Ref Range    pH Venous 7.35 7.35 - 7.45    pCO2 Venous 54 (H) 35 - 50 mm Hg    pO2 Venous 27 25 - 47 mm Hg    Bicarbonate Venous 26 24 - 30 mmol/L    Base Excess/Deficit (+/-) 3.6   mmol/L    Oxyhemoglobin Venous 39.0 (L) 70.0 - 75.0 %    O2 Sat, Venous 39.8 (L) 70.0 - 75.0 %   UA with Microscopic reflex to Culture    Specimen: Urine, Catheter   Result Value Ref Range    Color Urine Light Yellow Colorless, Straw, Light Yellow, Yellow    Appearance Urine Clear Clear    Glucose Urine Negative Negative mg/dL    Bilirubin Urine Negative Negative    Ketones Urine 40  (A) Negative mg/dL    Specific Gravity Urine 1.017 1.001 - 1.030    Blood Urine 0.03 mg/dL (A) Negative    pH Urine 6.0 5.0 - 7.0    Protein Albumin Urine 10  (A) Negative mg/dL    Urobilinogen Urine <2.0 <2.0 mg/dL    Nitrite Urine Positive (A) Negative    Leukocyte Esterase Urine Negative Negative    Bacteria Urine Many (A) None Seen /HPF    Mucus Urine Present (A) None Seen /LPF    RBC Urine 1 <=2 /HPF    WBC Urine 5 <=5 /HPF    Squamous Epithelials Urine <1 <=1 /HPF    Hyaline Casts Urine 1 <=2 /LPF   Symptomatic; Unknown Influenza A/B & SARS-CoV2 (COVID-19) Virus PCR Multiplex Nasopharyngeal    Specimen: Nasopharyngeal; Swab   Result Value Ref Range    Influenza A PCR Negative Negative    Influenza B PCR Negative Negative    SARS CoV2 PCR Positive (A) Negative   Result  Value Ref Range    Ammonia 21 11 - 35 umol/L   Result Value Ref Range    Procalcitonin 0.02 0.00 - 0.49 ng/mL   CBC with platelets and differential   Result Value Ref Range    WBC Count 9.3 4.0 - 11.0 10e3/uL    RBC Count 3.28 (L) 3.80 - 5.20 10e6/uL    Hemoglobin 10.1 (L) 11.7 - 15.7 g/dL    Hematocrit 30.6 (L) 35.0 - 47.0 %    MCV 93 78 - 100 fL    MCH 30.8 26.5 - 33.0 pg    MCHC 33.0 31.5 - 36.5 g/dL    RDW 12.6 10.0 - 15.0 %    Platelet Count 211 150 - 450 10e3/uL    % Neutrophils 62 %    % Lymphocytes 22 %    % Monocytes 14 %    % Eosinophils 0 %    % Basophils 0 %    % Immature Granulocytes 2 %    NRBCs per 100 WBC 0 <1 /100    Absolute Neutrophils 5.9 1.6 - 8.3 10e3/uL    Absolute Lymphocytes 2.1 0.8 - 5.3 10e3/uL    Absolute Monocytes 1.3 0.0 - 1.3 10e3/uL    Absolute Eosinophils 0.0 0.0 - 0.7 10e3/uL    Absolute Basophils 0.0 0.0 - 0.2 10e3/uL    Absolute Immature Granulocytes 0.2 <=0.4 10e3/uL    Absolute NRBCs 0.0 10e3/uL       RADIOLOGY:  Reviewed all pertinent imaging. Please see official radiology report.  CT Abdomen Pelvis w/o Contrast   Final Result   IMPRESSION:    1.  No acute process demonstrated in the abdomen and pelvis.   2.  New multilevel compression deformities of the lower thoracic and lumbar spine.   3.  Otherwise stable exam.         CT Cervical Spine w/o Contrast   Final Result   IMPRESSION:   HEAD CT:   1.  No CT evidence for acute intracranial process.   2.  Brain atrophy and presumed chronic microvascular ischemic changes as above. Unchanged coarse calcification of the left frontal lobe.   3.  Inflammatory changes of the paranasal sinuses. Correlate with clinical evidence for sinusitis.      CERVICAL SPINE CT:   1.  Age-indeterminate superior endplate compression fractures at T1 and T2. Sclerosis of these levels favors subacute to chronic injury, but correlation with upper thoracic point tenderness is advised.   2.  Otherwise, no evidence for acute fracture or posttraumatic  subluxation.   3.  Multilevel cervical spondylosis greatest at C3-C4 and C5-C6.      CT Head w/o Contrast   Final Result   IMPRESSION:   HEAD CT:   1.  No CT evidence for acute intracranial process.   2.  Brain atrophy and presumed chronic microvascular ischemic changes as above. Unchanged coarse calcification of the left frontal lobe.   3.  Inflammatory changes of the paranasal sinuses. Correlate with clinical evidence for sinusitis.      CERVICAL SPINE CT:   1.  Age-indeterminate superior endplate compression fractures at T1 and T2. Sclerosis of these levels favors subacute to chronic injury, but correlation with upper thoracic point tenderness is advised.   2.  Otherwise, no evidence for acute fracture or posttraumatic subluxation.   3.  Multilevel cervical spondylosis greatest at C3-C4 and C5-C6.      XR Chest Port 1 View   Final Result   IMPRESSION: Heart size and pulmonary vessels are normal. Lungs are clear.   Old right rib fractures.            EKG:    Reviewed and interpreted as: 4/25/22 1558. Sinus rhythm. Vent rate 77 bpm. When compared with ECG of 10/19/19 ECG is similar.      I have independently reviewed and interpreted the EKG(s) documented above.        I, Blaire Sandoval, am serving as a scribe to document services personally performed by Dr. Cheri Shen based on my observation and the provider's statements to me. I, Cheri Shen MD attest that Blaire Sandoval is acting in a scribe capacity, has observed my performance of the services and has documented them in accordance with my direction.     Cheri Shen MD  04/25/22 1958

## 2022-04-25 NOTE — ED TRIAGE NOTES
Pt arrives via [facility]. Pt family reports that she is normally alert and oriented and able to walk unassisted. Over the past 18 hours she has become more and more weak. She was only responsive to painful stimuli for ems. Pt speaks hmong. En route ems aquired an IV and started fluids. EMS repost hypotension en route. BG en route 141.

## 2022-04-26 NOTE — ED NOTES
Changed wet brief w very small amount of formed bm; pt resisted changing with groans; gave fresh bedliner (chucks) and gave her a fresh blanket;

## 2022-04-26 NOTE — PROGRESS NOTES
[facility]    Medicine Progress Note - Hospitalist Service    Date of Admission:  4/25/2022    Assessment & Plan          Ivan Hook is a 81 year old female admitted on 4/25/2022. She has history of diabetes, hypertension, hyperlipidemia, peripheral neuropathy, previous traumatic subdural hematoma presents for evaluation of altered mental status and decreased oral intake found to have COVID-19    #Probable COVID-19 viral illness  With malaise, altered mental status, weakness.  Not hypoxic.  Lungs clear on x-ray.  Symptoms began within the last 24 hours.  Not vaccinated.  4/26 family requested recheck of COVID, since all family members tested negative, and patient has not been leaving the house-she tested negative today.  ID control recommended continuing isolation precautions.  Will obtain of the chest in a.m.  Continue remdesivir, plan 3-day course.  Not hypoxic, thus no indication for Decadron.  Patient was seen by ID, recommended 3-day course of remdesivir.    #Possible UTI  Zosyn started in ED, change to rocephin and await culture    #Seizure disorder  From what I can tell this is likely posttraumatic, she has an intracranial calcification likely related to an old traumatic subdural hematoma  Continue home Depakote and Keppra  If she will not take them by mouth then we can change to IV    #Diabetes  On metformin at home  Hold metformin, use sliding scale    #Hypokalemia  #Hypomag  Replacement protocols    #Age-indeterminate superior endplate compression fractures of T1 and T2  Sclerosis favoring subacute to chronic injury    #T9, T11, L1, L2, L3 compression fractures  Noted on abdominal CT  Uncertain chronicity  Could get MRI for better characterization before involving Neurosurg  She is on alendronate and suspect primary care already aware, would discuss with family before additional workup. PCP may be listed wrong in chart- I don't see that she has ever seen Dr Cervantes who is a  [place] provider    #Mood, home Zoloft  #Hyperlipidemia, hold home simvastatin  #Osteoporosis, hold home alendronate  #Hypertension, hold home lisinopril       Diet: Combination Diet Moderate Consistent Carb (60 g CHO per Meal) Diet; Mechanical/Dental Soft Diet    DVT Prophylaxis: Enoxaparin (Lovenox) SQ  [place] Catheter: Not present  Central Lines: None  Cardiac Monitoring: None  Code Status: Full Code      Disposition Plan   Expected Discharge: 04/28/2022     Anticipated discharge location: 1-2 days     The patient's care was discussed with the Bedside Nurse, Care Coordinator/ and Patient.    July Patel MD  Hospitalist Service  [facility]  Securely message with the Vocera Web Console (learn more here)  Text page via Twi[employer]or Paging/Directory     Clinically Significant Risk Factors Present on Admission             # Hypoalbuminemia: Albumin = 3.0 g/dL (Ref range: 3.5 - 5.0 g/dL) on admission, will monitor as appropriate   # Coagulation Defect: INR = 1.29 (Ref range: 0.90 - 1.15) and/or PTT = N/A on admission, will monitor for bleeding      ______________________________________________________________________    Interval History   Patient is new to me.  Chart reviewed.  Patient was seen and examined.  Afebrile.  Stable vitals.  Poor appetite.  Minimally interactive, even through .  Noted elevated BP.    Data reviewed today: I reviewed all medications, new labs and imaging results over the last 24 hours. I personally reviewed    Physical Exam   Vital Signs: Temp: (!) 96.6  F (35.9  C) Temp src: Axillary BP: 134/61 Pulse: 70   Resp: 16 SpO2: 97 % O2 Device: None (Room air)    Weight: 71 lbs 0 oz  General: Demented frail elderly female lying in bed, left eye crusted shut, right eye open and staring.  She does attend to me when I wave hello to her, but does not really track.  HEENT: Head normocephalic atraumatic, oral mucosa moist. Sclerae anicteric  CV: Regular  rhythm, normal rate, no murmurs  Resp: No wheezes, no rales or rhonchi, no focal consolidations  GI: Belly soft, nondistended, nontender, bowel sounds present  Skin: No rashes or lesions  Extremities: No peripheral edema  Psych: Flat affect, not in any distress  Neuro: Jaw tremor noted    Data   Recent Labs   Lab 04/26/22  1207 04/26/22  0714 04/26/22  0631 04/25/22  2306 04/25/22  1550   WBC  --  10.3  --   --  9.3   HGB  --  9.9*  --   --  10.1*   MCV  --  95  --   --  93   PLT  --  211  --   --  211   INR  --   --   --   --  1.29*   NA  --  143  --   --  141   POTASSIUM  --  3.7  --   --  3.4*   CHLORIDE  --  108*  --   --  104   CO2  --  23  --   --  25   BUN  --  13  --   --  18   CR  --  0.78  --   --  0.87   ANIONGAP  --  12  --   --  12   FLORES  --  8.7  --   --  8.7   GLC 72 86 97   < > 115   ALBUMIN  --   --   --   --  3.0*   PROTTOTAL  --   --   --   --  7.9   BILITOTAL  --   --   --   --  0.6   ALKPHOS  --   --   --   --  98   ALT  --   --   --   --  11   AST  --   --   --   --  35   LIPASE  --   --   --   --  24    < > = values in this interval not displayed.     Recent Results (from the past 24 hour(s))   XR Chest Port 1 View    Narrative    EXAM: XR CHEST PORT 1 VIEW  LOCATION: [facility]  DATE/TIME: 4/25/2022 4:48 PM    INDICATION: AMS  COMPARISON: 10/19/2019      Impression    IMPRESSION: Heart size and pulmonary vessels are normal. Lungs are clear.  Old right rib fractures.   CT Head w/o Contrast    Narrative    EXAM: CT HEAD W/O CONTRAST, CT CERVICAL SPINE W/O CONTRAST  LOCATION: [facility]  DATE/TIME: 4/25/2022 4:53 PM    INDICATION: Decreased responsiveness. Altered mental status. Increasing weakness.  COMPARISON: CT head 07/26/2020  TECHNIQUE:   1) Routine CT Head without IV contrast. Multiplanar reformats. Dose reduction techniques were used.  2) Routine CT Cervical Spine without IV contrast. Multiplanar reformats. Dose reduction techniques were  used.    FINDINGS:   HEAD CT:   INTRACRANIAL CONTENTS: No intracranial hemorrhage, extraaxial collection, or mass effect.  No CT evidence of acute infarct. Mild to moderate presumed chronic small vessel ischemic changes. Chronic coarse calcification of the parasagittal left frontal   lobe as seen previously. Mild to moderate generalized volume loss. No hydrocephalus.     VISUALIZED ORBITS/SINUSES/MASTOIDS: Prior bilateral cataract surgery. Visualized portions of the orbits are otherwise unremarkable. Opacification of the right maxillary sinus with some chronic appearing mural hyperostosis. Additional fast sphenoid sinus   No middle ear or mastoid effusion.    BONES/SOFT TISSUES: No acute abnormality.    CERVICAL SPINE CT:   VERTEBRA: 3 mm retrolisthesis at C3-C4 and C5-C6. Superior endplate depressions at T1 and T2 with 30% central vertebral height loss at these levels and associated tubing endplate sclerosis favoring subacute to chronic fractures that are somewhat   age-indeterminate. No definite acute fracture or posttraumatic subluxation of the cervical spine.     CANAL/FORAMINA: Diffuse cervical spondylosis including loss of disc height and endplate/uncovertebral spurring greatest at C3-C4 and C5-C6. At C3-C4, mild spinal canal stenosis with moderate to severe bilateral neural foraminal stenosis. At C5-C6, mild   spinal canal stenosis and moderate bilateral neural foraminal stenosis.    PARASPINAL: Prevertebral soft tissues within normal limits for thickness. Mild atherosclerotic calcifications at the carotid bifurcations. Mildly heterogeneous thyroid gland. Visualized lung fields are clear.      Impression    IMPRESSION:  HEAD CT:  1.  No CT evidence for acute intracranial process.  2.  Brain atrophy and presumed chronic microvascular ischemic changes as above. Unchanged coarse calcification of the left frontal lobe.  3.  Inflammatory changes of the paranasal sinuses. Correlate with clinical evidence for  sinusitis.    CERVICAL SPINE CT:  1.  Age-indeterminate superior endplate compression fractures at T1 and T2. Sclerosis of these levels favors subacute to chronic injury, but correlation with upper thoracic point tenderness is advised.  2.  Otherwise, no evidence for acute fracture or posttraumatic subluxation.  3.  Multilevel cervical spondylosis greatest at C3-C4 and C5-C6.   CT Cervical Spine w/o Contrast    Narrative    EXAM: CT HEAD W/O CONTRAST, CT CERVICAL SPINE W/O CONTRAST  LOCATION: [facility]  DATE/TIME: 4/25/2022 4:53 PM    INDICATION: Decreased responsiveness. Altered mental status. Increasing weakness.  COMPARISON: CT head 07/26/2020  TECHNIQUE:   1) Routine CT Head without IV contrast. Multiplanar reformats. Dose reduction techniques were used.  2) Routine CT Cervical Spine without IV contrast. Multiplanar reformats. Dose reduction techniques were used.    FINDINGS:   HEAD CT:   INTRACRANIAL CONTENTS: No intracranial hemorrhage, extraaxial collection, or mass effect.  No CT evidence of acute infarct. Mild to moderate presumed chronic small vessel ischemic changes. Chronic coarse calcification of the parasagittal left frontal   lobe as seen previously. Mild to moderate generalized volume loss. No hydrocephalus.     VISUALIZED ORBITS/SINUSES/MASTOIDS: Prior bilateral cataract surgery. Visualized portions of the orbits are otherwise unremarkable. Opacification of the right maxillary sinus with some chronic appearing mural hyperostosis. Additional fast sphenoid sinus   No middle ear or mastoid effusion.    BONES/SOFT TISSUES: No acute abnormality.    CERVICAL SPINE CT:   VERTEBRA: 3 mm retrolisthesis at C3-C4 and C5-C6. Superior endplate depressions at T1 and T2 with 30% central vertebral height loss at these levels and associated tubing endplate sclerosis favoring subacute to chronic fractures that are somewhat   age-indeterminate. No definite acute fracture or posttraumatic  subluxation of the cervical spine.     CANAL/FORAMINA: Diffuse cervical spondylosis including loss of disc height and endplate/uncovertebral spurring greatest at C3-C4 and C5-C6. At C3-C4, mild spinal canal stenosis with moderate to severe bilateral neural foraminal stenosis. At C5-C6, mild   spinal canal stenosis and moderate bilateral neural foraminal stenosis.    PARASPINAL: Prevertebral soft tissues within normal limits for thickness. Mild atherosclerotic calcifications at the carotid bifurcations. Mildly heterogeneous thyroid gland. Visualized lung fields are clear.      Impression    IMPRESSION:  HEAD CT:  1.  No CT evidence for acute intracranial process.  2.  Brain atrophy and presumed chronic microvascular ischemic changes as above. Unchanged coarse calcification of the left frontal lobe.  3.  Inflammatory changes of the paranasal sinuses. Correlate with clinical evidence for sinusitis.    CERVICAL SPINE CT:  1.  Age-indeterminate superior endplate compression fractures at T1 and T2. Sclerosis of these levels favors subacute to chronic injury, but correlation with upper thoracic point tenderness is advised.  2.  Otherwise, no evidence for acute fracture or posttraumatic subluxation.  3.  Multilevel cervical spondylosis greatest at C3-C4 and C5-C6.   CT Abdomen Pelvis w/o Contrast    Narrative    EXAM: CT ABDOMEN PELVIS W/O CONTRAST  LOCATION: [facility]  DATE/TIME: 4/25/2022 4:54 PM    INDICATION: Altered mental status  COMPARISON: 10/19/2019  TECHNIQUE: CT scan of the abdomen and pelvis was performed without IV contrast. Multiplanar reformats were obtained. Dose reduction techniques were used.  CONTRAST: None.    FINDINGS:   LOWER CHEST: Minimal scattered atelectasis and/or fibrosis. No effusions.    HEPATOBILIARY: Nonspecific peripheral calcifications and/or capsular implants. These are stable. No definite intrahepatic lesions demonstrated in the absence of contrast.  Cholecystectomy.    PANCREAS: No significant mass, duct dilatation, or inflammatory change.    SPLEEN: Normal size. Small peripherally calcified lesions may represent small splenic artery aneurysms. These are not significantly changed since comparison.    ADRENAL GLANDS: No significant nodules.    KIDNEYS/BLADDER: No significant mass, stone, or hydronephrosis.    BOWEL: No obstruction or inflammatory change.    LYMPH NODES: No gross adenopathy in the absence of contrast.    VASCULATURE: There are moderate atherosclerotic changes of the visualized aorta and its branches. There is no evidence of aortic aneurysm.    PELVIC ORGANS: No pelvic masses.    MUSCULOSKELETAL: No frankly destructive bony lesions. Multilevel thoracic and lumbar spine compression deformities new since 2019 with approximately 50% loss of height at T9, T11, L1, L2, and L3.      Impression    IMPRESSION:   1.  No acute process demonstrated in the abdomen and pelvis.  2.  New multilevel compression deformities of the lower thoracic and lumbar spine.  3.  Otherwise stable exam.

## 2022-04-26 NOTE — CONSULTS
Consultation - Infectious Disease  [facility]  Ivan Hook,  1940, MRN [id number]    Admitting Dx: Infection due to 2019 novel coronavirus [U07.1]    PCP: Maria G Riojas, [phone]       ASSESSMENT   81-year-old woman admitted with confusion.  ID consulted regarding positive COVID-19 PCR.    1. COVID-19.  No history of vaccination.  No respiratory symptoms.  On room air.  Chest x-ray normal.  High risk of progression of disease, therefore started on 3-day course of IV remdesivir, per Patricia protocol  2. Possible UTI.  Abnormal UA.  Cultures pending.  Empiric antibiotic started        Active Problems:    Diabetes mellitus, type 2 (H)    Hypomagnesemia    Delirium    Infection due to 2019 novel coronavirus    Seizure disorder (H)       PLAN   -Agree with 3-day course of remdesivir  -If requiring oxygen or developing respiratory symptoms, could extend to a 5-day course of remdesivir, with addition of steroids  -Follow-up on urine culture, narrow antibiotics as able    Thank you for this consult. I will sign-off at this time. Please do not hesitate to call if there are any further questions or if there is a change in the patient's clinical status.     Keith Celeste MD  [facility]  Direct messaging: [facility] Paging  On-Call ID provider: [phone], option: [phone]      ===========================================      Chief Complaint   <principal problem not specified>       HPI     We have been requested by July Patel MD to evaluate Ivan Hook for the above.    History obtained by electronic health record.  Unable to communicate with the patient with telephone .    Ivan Hook is a 81 year old woman with a history of diabetes, hypertension, hyperlipidemia, and previous history of subdural hematoma presenting to the ER with altered mental status.  Found to be positive for COVID-19.  No previous documentation for vaccination.  Patient's not having  "any respiratory symptoms and is satting well on room air.          Review of Systems   Unable to obtain due to mental status      Medical History  Past Medical History:   Diagnosis Date     Diabetes mellitus (H)      HLD (hyperlipidemia)      Hypertension      Osteoporosis 10/20/2019     Peripheral neuropathy     Surgical History  She  has a past surgical history that includes Cholecystectomy.     Social History  Reviewed, and she  reports that she has never smoked. She has never used smokeless tobacco. She reports that she does not drink alcohol and does not use drugs.  Social History     Social History Narrative     Not on file     Family History    Unable to obtain due to mental status           Allergies   No Known Allergies      Antibiotics   Ceftriaxone       Physical Exam     Temp:  [97.7  F (36.5  C)-98.4  F (36.9  C)] 98.4  F (36.9  C)  Pulse:  [73-84] 74  Resp:  [10-29] 11  BP: (114-162)/(55-77) 123/65  SpO2:  [93 %-99 %] 97 %    /65   Pulse 74   Temp 98.4  F (36.9  C) (Axillary)   Resp 11   Ht 1.499 m (4' 11\")   Wt 47.6 kg (105 lb)   SpO2 97%   BMI 21.21 kg/m      GENERAL: Small, frail woman, lying in bed in no acute distress.   HENT:  Head is normocephalic, atraumatic.   EYES:  Eyes have anicteric sclerae without conjunctival injection or stigmata of endocarditis.   LUNGS:  Clear to auscultation.  CARDIOVASCULAR:  Regular rate and rhythm with no murmurs, gallops or rubs.  ABDOMEN:  Normal bowel sounds, soft, nontender. No appreciable hepatosplenomegaly  EXT: Extremities warm and without edema.  SKIN:  No acute rashes. No stigmata of endocarditis.  NEUROLOGIC:  Grossly nonfocal.      Cultures   4/25 blood cultures x2: No growth to date  4/25 urine culture: Pending      Laboratory results     Recent Labs   Lab 04/26/22  0714 04/25/22  1550   WBC 10.3 9.3   HGB 9.9* 10.1*    211       Recent Labs   Lab 04/26/22  0714 04/25/22  1550    141   CO2 23 25   BUN 13 18   ALBUMIN  --  3.0* "   ALKPHOS  --  98   ALT  --  11   AST  --  35       Recent Labs   Lab 04/26/22  0714 04/25/22  1550   CRP 2.2* 1.9*           Imaging   Radiology results reviewed    XR Chest Port 1 View    Result Date: 4/25/2022  EXAM: XR CHEST PORT 1 VIEW LOCATION: [facility] DATE/TIME: 4/25/2022 4:48 PM INDICATION: AMS COMPARISON: 10/19/2019     IMPRESSION: Heart size and pulmonary vessels are normal. Lungs are clear. Old right rib fractures.    CT Abdomen Pelvis w/o Contrast    Result Date: 4/25/2022  EXAM: CT ABDOMEN PELVIS W/O CONTRAST LOCATION: [facility] DATE/TIME: 4/25/2022 4:54 PM INDICATION: Altered mental status COMPARISON: 10/19/2019 TECHNIQUE: CT scan of the abdomen and pelvis was performed without IV contrast. Multiplanar reformats were obtained. Dose reduction techniques were used. CONTRAST: None. FINDINGS: LOWER CHEST: Minimal scattered atelectasis and/or fibrosis. No effusions. HEPATOBILIARY: Nonspecific peripheral calcifications and/or capsular implants. These are stable. No definite intrahepatic lesions demonstrated in the absence of contrast. Cholecystectomy. PANCREAS: No significant mass, duct dilatation, or inflammatory change. SPLEEN: Normal size. Small peripherally calcified lesions may represent small splenic artery aneurysms. These are not significantly changed since comparison. ADRENAL GLANDS: No significant nodules. KIDNEYS/BLADDER: No significant mass, stone, or hydronephrosis. BOWEL: No obstruction or inflammatory change. LYMPH NODES: No gross adenopathy in the absence of contrast. VASCULATURE: There are moderate atherosclerotic changes of the visualized aorta and its branches. There is no evidence of aortic aneurysm. PELVIC ORGANS: No pelvic masses. MUSCULOSKELETAL: No frankly destructive bony lesions. Multilevel thoracic and lumbar spine compression deformities new since 2019 with approximately 50% loss of height at T9, T11, L1, L2, and L3.      IMPRESSION: 1.  No acute process demonstrated in the abdomen and pelvis. 2.  New multilevel compression deformities of the lower thoracic and lumbar spine. 3.  Otherwise stable exam.     CT Cervical Spine w/o Contrast    Result Date: 4/25/2022  EXAM: CT HEAD W/O CONTRAST, CT CERVICAL SPINE W/O CONTRAST LOCATION: [facility] DATE/TIME: 4/25/2022 4:53 PM INDICATION: Decreased responsiveness. Altered mental status. Increasing weakness. COMPARISON: CT head 07/26/2020 TECHNIQUE: 1) Routine CT Head without IV contrast. Multiplanar reformats. Dose reduction techniques were used. 2) Routine CT Cervical Spine without IV contrast. Multiplanar reformats. Dose reduction techniques were used. FINDINGS: HEAD CT: INTRACRANIAL CONTENTS: No intracranial hemorrhage, extraaxial collection, or mass effect.  No CT evidence of acute infarct. Mild to moderate presumed chronic small vessel ischemic changes. Chronic coarse calcification of the parasagittal left frontal lobe as seen previously. Mild to moderate generalized volume loss. No hydrocephalus. VISUALIZED ORBITS/SINUSES/MASTOIDS: Prior bilateral cataract surgery. Visualized portions of the orbits are otherwise unremarkable. Opacification of the right maxillary sinus with some chronic appearing mural hyperostosis. Additional fast sphenoid sinus No middle ear or mastoid effusion. BONES/SOFT TISSUES: No acute abnormality. CERVICAL SPINE CT: VERTEBRA: 3 mm retrolisthesis at C3-C4 and C5-C6. Superior endplate depressions at T1 and T2 with 30% central vertebral height loss at these levels and associated tubing endplate sclerosis favoring subacute to chronic fractures that are somewhat age-indeterminate. No definite acute fracture or posttraumatic subluxation of the cervical spine. CANAL/FORAMINA: Diffuse cervical spondylosis including loss of disc height and endplate/uncovertebral spurring greatest at C3-C4 and C5-C6. At C3-C4, mild spinal canal stenosis with  moderate to severe bilateral neural foraminal stenosis. At C5-C6, mild spinal canal stenosis and moderate bilateral neural foraminal stenosis. PARASPINAL: Prevertebral soft tissues within normal limits for thickness. Mild atherosclerotic calcifications at the carotid bifurcations. Mildly heterogeneous thyroid gland. Visualized lung fields are clear.     IMPRESSION: HEAD CT: 1.  No CT evidence for acute intracranial process. 2.  Brain atrophy and presumed chronic microvascular ischemic changes as above. Unchanged coarse calcification of the left frontal lobe. 3.  Inflammatory changes of the paranasal sinuses. Correlate with clinical evidence for sinusitis. CERVICAL SPINE CT: 1.  Age-indeterminate superior endplate compression fractures at T1 and T2. Sclerosis of these levels favors subacute to chronic injury, but correlation with upper thoracic point tenderness is advised. 2.  Otherwise, no evidence for acute fracture or posttraumatic subluxation. 3.  Multilevel cervical spondylosis greatest at C3-C4 and C5-C6.    CT Head w/o Contrast    Result Date: 4/25/2022  EXAM: CT HEAD W/O CONTRAST, CT CERVICAL SPINE W/O CONTRAST LOCATION: [facility] DATE/TIME: 4/25/2022 4:53 PM INDICATION: Decreased responsiveness. Altered mental status. Increasing weakness. COMPARISON: CT head 07/26/2020 TECHNIQUE: 1) Routine CT Head without IV contrast. Multiplanar reformats. Dose reduction techniques were used. 2) Routine CT Cervical Spine without IV contrast. Multiplanar reformats. Dose reduction techniques were used. FINDINGS: HEAD CT: INTRACRANIAL CONTENTS: No intracranial hemorrhage, extraaxial collection, or mass effect.  No CT evidence of acute infarct. Mild to moderate presumed chronic small vessel ischemic changes. Chronic coarse calcification of the parasagittal left frontal lobe as seen previously. Mild to moderate generalized volume loss. No hydrocephalus. VISUALIZED ORBITS/SINUSES/MASTOIDS: Prior  bilateral cataract surgery. Visualized portions of the orbits are otherwise unremarkable. Opacification of the right maxillary sinus with some chronic appearing mural hyperostosis. Additional fast sphenoid sinus No middle ear or mastoid effusion. BONES/SOFT TISSUES: No acute abnormality. CERVICAL SPINE CT: VERTEBRA: 3 mm retrolisthesis at C3-C4 and C5-C6. Superior endplate depressions at T1 and T2 with 30% central vertebral height loss at these levels and associated tubing endplate sclerosis favoring subacute to chronic fractures that are somewhat age-indeterminate. No definite acute fracture or posttraumatic subluxation of the cervical spine. CANAL/FORAMINA: Diffuse cervical spondylosis including loss of disc height and endplate/uncovertebral spurring greatest at C3-C4 and C5-C6. At C3-C4, mild spinal canal stenosis with moderate to severe bilateral neural foraminal stenosis. At C5-C6, mild spinal canal stenosis and moderate bilateral neural foraminal stenosis. PARASPINAL: Prevertebral soft tissues within normal limits for thickness. Mild atherosclerotic calcifications at the carotid bifurcations. Mildly heterogeneous thyroid gland. Visualized lung fields are clear.     IMPRESSION: HEAD CT: 1.  No CT evidence for acute intracranial process. 2.  Brain atrophy and presumed chronic microvascular ischemic changes as above. Unchanged coarse calcification of the left frontal lobe. 3.  Inflammatory changes of the paranasal sinuses. Correlate with clinical evidence for sinusitis. CERVICAL SPINE CT: 1.  Age-indeterminate superior endplate compression fractures at T1 and T2. Sclerosis of these levels favors subacute to chronic injury, but correlation with upper thoracic point tenderness is advised. 2.  Otherwise, no evidence for acute fracture or posttraumatic subluxation. 3.  Multilevel cervical spondylosis greatest at C3-C4 and C5-C6.      Data reviewed today: I reviewed all medications, new labs and imaging results over  the last 24 hours. I personally reviewed the chest x-ray image(s) showing without infiltrates.  The patient's care was discussed with the Bedside Nurse.

## 2022-04-26 NOTE — ED NOTES
"Contacted daughter (Arlyn) to update re: COVID+ status and inquire about baseline cognitive status; daughter said pt does not respond to questions; daughter states that the decline has been present for several years--\"dementia\"; daughter inquired whether she could visit her mom; I will check policy and call her back.  "

## 2022-04-26 NOTE — ED NOTES
"[facility] ED Handoff Report    ED Chief Complaint:    ED Diagnosis:  (R41.0) Delirium  Comment:   Plan:     (E83.42) Hypomagnesemia  Comment:   Plan:    (U07.1) Infection due to 2019 novel coronavirus  Comment:  Plan:       PMH:    Past Medical History:   Diagnosis Date     Diabetes mellitus (H)      HLD (hyperlipidemia)      Hypertension      Osteoporosis 10/20/2019     Peripheral neuropathy         Code Status:  Full Code     Falls Risk: Yes Band: Applied    Current Living Situation/Residence: lives with their son or daughter     Elimination Status: Continent: wears briefs     Activity Level: Bedrest at this time due to weakness    Patients Preferred Language:  Other: [facility]     Needed: Yes    Vital Signs:  BP (!) 156/71   Pulse 75   Temp 98.4  F (36.9  C) (Axillary)   Resp 14   Ht 1.499 m (4' 11\")   Wt 47.6 kg (105 lb)   SpO2 98%   BMI 21.21 kg/m       Cardiac Rhythm: NSR    Pain Score: Denies     Is the Patient Confused:  Intermittent confusion noted    Last Food or Drink: Unknown     Focused Assessment:      Tests Performed: Done: Labs and Imaging    Treatments Provided:      Family Dynamics/Concerns: No    Family Updated On Visitor Policy: Yes    Plan of Care Communicated to Family: Yes        Belongings Checklist Done and Signed by Patient: Yes    Medications sent with patient: None    Covid: asymptomatic , positive on 4/25. Repeat Covid swab on 4/26 negative.     Additional Information:     RN: Emily Fox   4/26/2022 3:12 PM       "

## 2022-04-26 NOTE — PLAN OF CARE
Spoke with Josi Glynn, infection control, regarding pt's covid test yesterday being positive and today's covid test being negative. Josi states to keep pt in special precautions.

## 2022-04-26 NOTE — H&P
[facility]    History and Physical - Hospitalist Service       Date of Admission:  2022  Ivan Hook,  1940, MRN [id number]  PCP: Maria G Whitmore    Assessment & Plan      Ivan Hook is a 81 year old female admitted on 2022. She has history of diabetes, hypertension, hyperlipidemia, peripheral neuropathy, previous traumatic subdural hematoma presents for evaluation of altered mental status and decreased oral intake found to have COVID-19    #COVID-19 viral illness  With malaise, altered mental status, weakness.  Not hypoxic.  Lungs clear on x-ray.  Symptoms began within the last 24 hours.  Not vaccinated.  Started on remdesivir and decadron from ED.  Continue remdesivir, plan 3-day course.  Don't currently see an indication to continue decadron, so hold on further doses.  Will ask ID to see as I feel she is high risk to decompensate and may benefit from mAb.    #UTI  Zosyn started in ED, change to rocephin and await culture    #Seizure disorder  From what I can tell this is likely posttraumatic, she has an intracranial calcification likely related to an old traumatic subdural hematoma  Continue home Depakote and Keppra  If she will not take them by mouth then we can change to IV    #Diabetes  On metformin at home  Hold metformin, use sliding scale    #Hypokalemia  #Hypomag  Replacement protocols    #Age-indeterminate superior endplate compression fractures of T1 and T2  Sclerosis favoring subacute to chronic injury    #T9, T11, L1, L2, L3 compression fractures  Noted on abdominal CT  Uncertain chronicity  Could get MRI for better characterization before involving Neurosurg  She is on alendronate and suspect primary care already aware, would discuss with family before additional workup. PCP may be listed wrong in chart- I don't see that she has ever seen Dr Cervantes who is a [facility] provider    #Mood, home Zoloft  #Hyperlipidemia, hold home  "simvastatin  #Osteoporosis, hold home alendronate  #Hypertension, hold home lisinopril         Diet: Combination Diet Moderate Consistent Carb (60 g CHO per Meal) Diet; Mechanical/Dental Soft Diet  DVT Prophylaxis: High risk. Lovenox    Coyne Catheter: Not present  Central Lines: None  Code Status: Full Code. Unable to discuss with family    Clinically Significant Risk Factors Present on Admission           # Hypomagnesemia: Mg = 1.5 mg/dL (Ref range: 1.8 - 2.6 mg/dL) on admission, will replace as needed   # Hypoalbuminemia: Albumin = 3.0 g/dL (Ref range: 3.5 - 5.0 g/dL) on admission, will monitor as appropriate   # Coagulation Defect: INR = 1.29 (Ref range: 0.90 - 1.15) and/or PTT = N/A on admission, will monitor for bleeding        Disposition Plan   Expected Discharge: 04/28/2022     Anticipated discharge location: home     The patient's care was discussed with the Bedside Nurse.    Martha Walker MD  [facility]  Text page via [facility] Paging/Directory      ______________________________________________________________________    Chief Complaint   Ivan Hook is a 81 year old female who presents for altered mental status and decreased oral intake    History of Present Illness   Patient is nonresponsive to questions at the time of my visit.  Apparently at baseline she is able to talk and by report was earlier conversing quite fluently with the  however per the , she was not making any sense.  I tried to call family member, Arlyn Kohler, listed in the chart, unsure if this person needs an  but was only responding \"no\" to me even when I asked \"Arlyn Kohler?\"   Per chart review RN note 4/25/22:  Pt arrives via [facility]. Pt family reports that she is normally alert and oriented and able to walk unassisted. Over the past 18 hours she has become more and more weak. She was only responsive to painful stimuli for ems. Pt speaks hmong. En route ems aquired an IV and started " fluids. EMS repost hypotension en route. BG en route 141.      Review of Systems    Review of systems not obtained due to patient factors - mental status    Past Medical History    I have reviewed this patient's medical history and updated it with pertinent information if needed.   Past Medical History:   Diagnosis Date     Diabetes mellitus (H)      HLD (hyperlipidemia)      Hypertension      Osteoporosis 10/20/2019     Peripheral neuropathy        Past Surgical History   I have reviewed this patient's surgical history and updated it with pertinent information if needed.  Past Surgical History:   Procedure Laterality Date     CHOLECYSTECTOMY         Social History   I have reviewed this patient's social history and updated it with pertinent information if needed.  Social History     Tobacco Use     Smoking status: Never Smoker     Smokeless tobacco: Never Used   Substance Use Topics     Alcohol use: No     Drug use: No       Family History     Unable to obtain due to: AMS, unresponsive    Prior to Admission Medications   Prior to Admission Medications   Prescriptions Last Dose Informant Patient Reported? Taking?   alendronate (FOSAMAX) 70 MG tablet Past Week at Unknown time  Yes Yes   Sig: Take 70 mg by mouth every 7 days   divalproex sodium delayed-release (DEPAKOTE) 250 MG DR tablet 4/25/2022 at Unknown time  Yes Yes   Sig: Take 250 mg by mouth 2 times daily   levETIRAcetam (KEPPRA) 250 MG tablet 4/25/2022 at Unknown time  Yes Yes   Sig: Take 250 mg by mouth 2 times daily   lisinopril (ZESTRIL) 2.5 MG tablet 4/25/2022 at Unknown time  Yes Yes   Sig: Take 2.5 mg by mouth daily   metFORMIN (GLUCOPHAGE) 850 MG tablet 4/25/2022 at Unknown time  Yes Yes   Sig: Take 850 mg by mouth daily   sertraline (ZOLOFT) 50 MG tablet 4/25/2022 at Unknown time  Yes Yes   Sig: Take 50 mg by mouth daily   simvastatin (ZOCOR) 10 MG tablet 4/24/2022 at Unknown time  Yes Yes   Sig: Take 10 mg by mouth At Bedtime       Facility-Administered Medications: None     Allergies   No Known Allergies    Physical Exam   Vital Signs: Temp: 97.7  F (36.5  C) Temp src: Axillary BP: 126/77 Pulse: 75   Resp: 14 SpO2: 98 %      Weight: 0 lbs 0 oz    General: Demented frail elderly female lying in bed, left eye crusted shut, right eye open and staring.  She does attend to me when I wave hello to her, but does not really track.  HEENT: Head normocephalic atraumatic, oral mucosa moist. Sclerae anicteric  CV: Regular rhythm, normal rate, no murmurs  Resp: No wheezes, no rales or rhonchi, no focal consolidations  GI: Belly soft, nondistended, nontender, bowel sounds present  Skin: No rashes or lesions  Extremities: No peripheral edema  Psych: Flat affect, not in any distress  Neuro: Jaw tremor noted    Data   Data reviewed today: I reviewed all medications, new labs and imaging results over the last 24 hours.  EKG personally reviewed shows: NSR rate 77 bpm, low voltage QRS, flattened T waves dffusely    Recent Results (from the past 12 hour(s))   INR    Collection Time: 04/25/22  3:50 PM   Result Value Ref Range    INR 1.29 (H) 0.90 - 1.15   Comprehensive metabolic panel    Collection Time: 04/25/22  3:50 PM   Result Value Ref Range    Sodium 141 136 - 145 mmol/L    Potassium 3.4 (L) 3.5 - 5.0 mmol/L    Chloride 104 98 - 107 mmol/L    Carbon Dioxide (CO2) 25 22 - 31 mmol/L    Anion Gap 12 5 - 18 mmol/L    Urea Nitrogen 18 8 - 28 mg/dL    Creatinine 0.87 0.60 - 1.10 mg/dL    Calcium 8.7 8.5 - 10.5 mg/dL    Glucose 115 70 - 125 mg/dL    Alkaline Phosphatase 98 45 - 120 U/L    AST 35 0 - 40 U/L    ALT 11 0 - 45 U/L    Protein Total 7.9 6.0 - 8.0 g/dL    Albumin 3.0 (L) 3.5 - 5.0 g/dL    Bilirubin Total 0.6 0.0 - 1.0 mg/dL    GFR Estimate 67 >60 mL/min/1.73m2   Lipase    Collection Time: 04/25/22  3:50 PM   Result Value Ref Range    Lipase 24 0 - 52 U/L   Lactic acid whole blood    Collection Time: 04/25/22  3:50 PM   Result Value Ref Range    Lactic  Acid 1.0 0.7 - 2.0 mmol/L   Troponin I    Collection Time: 04/25/22  3:50 PM   Result Value Ref Range    Troponin I <0.01 0.00 - 0.29 ng/mL   Magnesium    Collection Time: 04/25/22  3:50 PM   Result Value Ref Range    Magnesium 1.5 (L) 1.8 - 2.6 mg/dL   TSH with free T4 reflex    Collection Time: 04/25/22  3:50 PM   Result Value Ref Range    TSH 1.31 0.30 - 5.00 uIU/mL   Blood gas venous    Collection Time: 04/25/22  3:50 PM   Result Value Ref Range    pH Venous 7.35 7.35 - 7.45    pCO2 Venous 54 (H) 35 - 50 mm Hg    pO2 Venous 27 25 - 47 mm Hg    Bicarbonate Venous 26 24 - 30 mmol/L    Base Excess/Deficit (+/-) 3.6   mmol/L    Oxyhemoglobin Venous 39.0 (L) 70.0 - 75.0 %    O2 Sat, Venous 39.8 (L) 70.0 - 75.0 %   Ammonia    Collection Time: 04/25/22  3:50 PM   Result Value Ref Range    Ammonia 21 11 - 35 umol/L   Procalcitonin    Collection Time: 04/25/22  3:50 PM   Result Value Ref Range    Procalcitonin 0.02 0.00 - 0.49 ng/mL   CBC with platelets and differential    Collection Time: 04/25/22  3:50 PM   Result Value Ref Range    WBC Count 9.3 4.0 - 11.0 10e3/uL    RBC Count 3.28 (L) 3.80 - 5.20 10e6/uL    Hemoglobin 10.1 (L) 11.7 - 15.7 g/dL    Hematocrit 30.6 (L) 35.0 - 47.0 %    MCV 93 78 - 100 fL    MCH 30.8 26.5 - 33.0 pg    MCHC 33.0 31.5 - 36.5 g/dL    RDW 12.6 10.0 - 15.0 %    Platelet Count 211 150 - 450 10e3/uL    % Neutrophils 62 %    % Lymphocytes 22 %    % Monocytes 14 %    % Eosinophils 0 %    % Basophils 0 %    % Immature Granulocytes 2 %    NRBCs per 100 WBC 0 <1 /100    Absolute Neutrophils 5.9 1.6 - 8.3 10e3/uL    Absolute Lymphocytes 2.1 0.8 - 5.3 10e3/uL    Absolute Monocytes 1.3 0.0 - 1.3 10e3/uL    Absolute Eosinophils 0.0 0.0 - 0.7 10e3/uL    Absolute Basophils 0.0 0.0 - 0.2 10e3/uL    Absolute Immature Granulocytes 0.2 <=0.4 10e3/uL    Absolute NRBCs 0.0 10e3/uL   Symptomatic; Unknown Influenza A/B & SARS-CoV2 (COVID-19) Virus PCR Multiplex Nasopharyngeal    Collection Time: 04/25/22  3:56 PM     Specimen: Nasopharyngeal; Swab   Result Value Ref Range    Influenza A PCR Negative Negative    Influenza B PCR Negative Negative    SARS CoV2 PCR Positive (A) Negative   UA with Microscopic reflex to Culture    Collection Time: 04/25/22  4:20 PM    Specimen: Urine, Catheter   Result Value Ref Range    Color Urine Light Yellow Colorless, Straw, Light Yellow, Yellow    Appearance Urine Clear Clear    Glucose Urine Negative Negative mg/dL    Bilirubin Urine Negative Negative    Ketones Urine 40  (A) Negative mg/dL    Specific Gravity Urine 1.017 1.001 - 1.030    Blood Urine 0.03 mg/dL (A) Negative    pH Urine 6.0 5.0 - 7.0    Protein Albumin Urine 10  (A) Negative mg/dL    Urobilinogen Urine <2.0 <2.0 mg/dL    Nitrite Urine Positive (A) Negative    Leukocyte Esterase Urine Negative Negative    Bacteria Urine Many (A) None Seen /HPF    Mucus Urine Present (A) None Seen /LPF    RBC Urine 1 <=2 /HPF    WBC Urine 5 <=5 /HPF    Squamous Epithelials Urine <1 <=1 /HPF    Hyaline Casts Urine 1 <=2 /LPF

## 2022-04-26 NOTE — ED NOTES
Called  in an attempt to get a better neuro assessment; pt did not follow commands or answer any questions through ; told patient she is covid+ and we are giving her meds; asked through  if she had any questions; told her we would be contacting her family; asked if she wanted a particular family member called; no answer; no answer to any questions; some eye tracking with the right eye; left eye closed from mucous. No reponse to request to squeeze my fingers; only slight retraction of foot and hands to painful stimuli

## 2022-04-26 NOTE — PHARMACY-ADMISSION MEDICATION HISTORY
Pharmacy Note - Admission Medication History    Pertinent Provider Information: Granddaughter read medication names off bottles to update medication list.      ______________________________________________________________________    Prior To Admission (PTA) med list completed and updated in EMR.       PTA Med List   Medication Sig Last Dose     alendronate (FOSAMAX) 70 MG tablet Take 70 mg by mouth every 7 days Past Week at Unknown time     divalproex sodium delayed-release (DEPAKOTE) 250 MG DR tablet Take 250 mg by mouth 2 times daily 4/25/2022 at Unknown time     levETIRAcetam (KEPPRA) 250 MG tablet Take 250 mg by mouth 2 times daily 4/25/2022 at Unknown time     lisinopril (ZESTRIL) 2.5 MG tablet Take 2.5 mg by mouth daily 4/25/2022 at Unknown time     metFORMIN (GLUCOPHAGE) 850 MG tablet Take 850 mg by mouth daily 4/25/2022 at Unknown time     sertraline (ZOLOFT) 50 MG tablet Take 50 mg by mouth daily 4/25/2022 at Unknown time     simvastatin (ZOCOR) 10 MG tablet Take 10 mg by mouth At Bedtime 4/24/2022 at Unknown time       Information source(s): Family member  Method of interview communication: in-person    Summary of Changes to PTA Med List  New: all medications  Discontinued: none  Changed: none    Patient was asked about OTC/herbal products specifically.  PTA med list reflects this.    In the past week, patient estimated taking medication this percent of the time:  greater than 90%.    Allergies were reviewed, assessed, and updated with the patient.      Patient does not use any multi-dose medications prior to admission.    The information provided in this note is only as accurate as the sources available at the time of the update(s).    Thank you for the opportunity to participate in the care of this patient.    Chey Armstrong [facility]  4/25/2022 8:08 PM

## 2022-04-27 PROBLEM — D64.9 ANEMIA: Status: ACTIVE | Noted: 2022-01-01

## 2022-04-27 NOTE — PLAN OF CARE
"  Problem: Plan of Care - These are the overarching goals to be used throughout the patient stay.    Goal: Patient-Specific Goal (Individualized)  Description: You can add care plan individualizations to a care plan. Examples of Individualization might be:  \"Parent requests to be called daily at 9am for status\", \"I have a hard time hearing out of my right ear\", or \"Do not touch me to wake me up as it startles me\".  Outcome: Ongoing, Progressing  Flowsheets (Taken 4/26/2022 1630)  Anxieties, Fears or Concerns: pt unable to respond  Goal: Absence of Hospital-Acquired Illness or Injury  Outcome: Ongoing, Progressing  Intervention: Identify and Manage Fall Risk  Recent Flowsheet Documentation  Taken 4/26/2022 1630 by Veronica Finney RN  Safety Promotion/Fall Prevention:   bed alarm on   clutter free environment maintained   fall prevention program maintained   lighting adjusted   nonskid shoes/slippers when out of bed   patient and family education   safety round/check completed  Intervention: Prevent Skin Injury  Recent Flowsheet Documentation  Taken 4/26/2022 2210 by Veronica Finney RN  Body Position: log-rolled  Intervention: Prevent and Manage VTE (Venous Thromboembolism) Risk  Recent Flowsheet Documentation  Taken 4/26/2022 1627 by Veronica Finney RN  Activity Management: bedrest  Goal: Optimal Comfort and Wellbeing  Outcome: Ongoing, Progressing     Problem: Risk for Delirium  Goal: Optimal Coping  Outcome: Ongoing, Progressing  Goal: Improved Behavioral Control  Outcome: Ongoing, Progressing  Goal: Improved Attention and Thought Clarity  Outcome: Ongoing, Progressing  Goal: Improved Sleep  Outcome: Ongoing, Progressing     Pt appears comfortable, no pain cues. Feeder - pt ate a few bites of green beans and rice, otherwise refused to eat more. Incontinent of bladder - changed briefs a few times. On IVF and abx. In special precautions.   "

## 2022-04-27 NOTE — PROGRESS NOTES
[facility]    Medicine Progress Note - Hospitalist Service    Date of Admission:  4/25/2022    Assessment & Plan          Ivan Hook is a 81 year old female admitted on 4/25/2022. She has history of diabetes, hypertension, hyperlipidemia, peripheral neuropathy, previous traumatic subdural hematoma presents for evaluation of altered mental status and decreased oral intake found to have COVID-19    #COVID-19 viral illness  With malaise, altered mental status, weakness.  Not hypoxic.  Lungs clear on x-ray.  Symptoms began within the last 24 hours.  Not vaccinated.  4/26 family requested recheck of COVID, since all family members tested negative, and patient has not been leaving the house-she tested negative 4/26 and positive on 4/27.  4/26 likely false negative.  ID control recommended continuing isolation precautions.  D-dimer elevated.  CTA chest negative for PE, showed small bilateral pleural effusions.  Continue remdesivir, plan 3-day course.  Not hypoxic, thus no indication for Decadron.  Patient was seen by ID, recommended 3-day course of remdesivir.    #E. coli UTI.  Pansensitive E. coli.  S/p dose of Zosyn in ED, changed to Rocephin.    #Seizure disorder  From what I can tell this is likely posttraumatic, she has an intracranial calcification likely related to an old traumatic subdural hematoma  Continue home Depakote and Keppra, IV, till reliable p.o.    #Diabetes  On metformin at home, holding.  Low normal BG, D5 containing IVF till reliable p.o.  Family planning on bringing food from home.    #Hypokalemia  #Hypomag  Replacement protocols    #Age-indeterminate superior endplate compression fractures of T1 and T2  Sclerosis favoring subacute to chronic injury    #T9, T11, L1, L2, L3 compression fractures  Noted on abdominal CT  Uncertain chronicity  Patient will be able to cooperate with MRI, will obtain CT of T and L-spine's.  Will involve neurosurgery if any abnormalities noted.  She  is on alendronate and suspect primary care already aware, would discuss with family before additional workup. PCP may be listed wrong in chart- I don't see that she has ever seen Dr Cervantes who is a [facility] provider    #Mood, home Zoloft  #History of dementia without behavioral problems, per family.  Family requesting to call patient's daughter/PCA for interpreting, to avoid confusion, as patient thinks that  is additional family member.  OT evaluation.  #Hyperlipidemia, hold home simvastatin  #Osteoporosis, hold home alendronate  #Hypertension, hold home lisinopril  #Frailty.  Lives at home with daughter/PCA.  No recent falls.  Ambulates with a walker.  PT evaluation for disposition needs       Diet: Combination Diet Moderate Consistent Carb (60 g CHO per Meal) Diet; Mechanical/Dental Soft Diet    DVT Prophylaxis: Enoxaparin (Lovenox) SQ  Coyne Catheter: Not present  Central Lines: None  Cardiac Monitoring: None  Code Status: Full Code      Disposition Plan   Expected Discharge: 04/28/2022     Anticipated discharge location: 1-2 days     The patient's care was discussed with the Bedside Nurse, Care Coordinator/ and Patient.    July Patel MD  Hospitalist Service  [facility]  Securely message with the Vocera Web Console (learn more here)  Text page via [facility] Paging/Directory     Clinically Significant Risk Factors Present on Admission           # Hypomagnesemia: Mg = 1.5 mg/dL (Ref range: 1.8 - 2.6 mg/dL) on admission, will replace as needed          ______________________________________________________________________    Interval History   Patient is somewhat interactive today.  Fluent speech.  Not answering interpreters questions, not redirectable skin monologues.  Slight left-sided facial droop, at baseline per family.  Unable to obtain HPI/ROS, given patient's encephalopathy/dementia.  Labs and images reviewed.  Updated over the phone patient's  daughter and granddaughter.    Data reviewed today: I reviewed all medications, new labs and imaging results over the last 24 hours. I personally reviewed    Physical Exam   Vital Signs: Temp: 98  F (36.7  C) Temp src: Oral BP: 137/60 Pulse: 65   Resp: 17 SpO2: 98 % O2 Device: None (Room air)    Weight: 71 lbs 0 oz  General: Demented frail elderly female lying in bed, left eye crusted shut, with slight left-sided facial droop, chronic.  Fluent speech.  Not answering questions or .  Keeps asking  to come and visit her.  HEENT: Head normocephalic atraumatic, oral mucosa moist. Sclerae anicteric  CV: Regular rhythm, normal rate, no murmurs  Resp: No wheezes, no rales or rhonchi, no focal consolidations  GI: Belly soft, nondistended, nontender, bowel sounds present  Skin: No rashes or lesions  Extremities: No peripheral edema  Psych: Flat affect, not in any distress  Neuro: Jaw tremor noted    Data   Recent Labs   Lab 04/27/22  0857 04/27/22  0837 04/27/22  0656 04/26/22  1207 04/26/22  0714 04/25/22  2306 04/25/22  1550   WBC  --   --  8.9  --  10.3  --  9.3   HGB  --   --  9.3*  --  9.9*  --  10.1*   MCV  --   --  96  --  95  --  93   PLT  --   --  196  --  211  --  211   INR  --   --   --   --   --   --  1.29*   NA  --   --  144  --  143  --  141   POTASSIUM  --   --  3.7  --  3.7  --  3.4*   CHLORIDE  --   --  110*  --  108*  --  104   CO2  --   --  22  --  23  --  25   BUN  --   --  10  --  13  --  18   CR  --   --  0.68  --  0.78  --  0.87   ANIONGAP  --   --  12  --  12  --  12   FLORES  --   --  8.1*  --  8.7  --  8.7   GLC 63* 60* 69*   < > 86   < > 115   ALBUMIN  --   --   --   --   --   --  3.0*   PROTTOTAL  --   --   --   --   --   --  7.9   BILITOTAL  --   --   --   --   --   --  0.6   ALKPHOS  --   --   --   --   --   --  98   ALT  --   --   --   --   --   --  11   AST  --   --   --   --   --   --  35   LIPASE  --   --   --   --   --   --  24    < > = values in this interval not  displayed.     No results found for this or any previous visit (from the past 24 hour(s)).

## 2022-04-27 NOTE — PLAN OF CARE
Problem: Plan of Care - These are the overarching goals to be used throughout the patient stay.    Goal: Plan of Care Review/Shift Note  Outcome: Ongoing, Progressing   Goal Outcome Evaluation:  Had a blood sugar of 60 this morning. Treated per hypoglycemia protocol. Took 120 ml of juice. Still under 70 X 2. 25 ml of D50 given. Blood sugar came up to 136. Pt also ate 50% of breakfast. Blood sugar at 1300 = 201. Pt refused lunch. IV fluid infusing. Incontinent of urine x 2. Pt communicate needs by sign. Pt is on room air oxygen saturation 98%. lung sounds diminished.     Ally Murphy RN

## 2022-04-27 NOTE — PLAN OF CARE
Problem: Risk for Delirium  Goal: Improved Sleep  Outcome: Ongoing, Progressing     Pt slept well. Alert, oriented to all but situation. Cooperative with cares. Moves self in bed, but 2 person assist with incontinence care and boosting in bed. IVF infusing. Lung sounds diminished, clear. Repeat COVID swab sent at 0600. Sats upper 90s overnight on room air. K and mag protocols, re-check this am.

## 2022-04-28 NOTE — PROGRESS NOTES
[facility]    Medicine Progress Note - Hospitalist Service    Date of Admission:  4/25/2022    Assessment & Plan          Ivan Hook is a 81 year old female admitted on 4/25/2022. She has history of diabetes, hypertension, hyperlipidemia, peripheral neuropathy, previous traumatic subdural hematoma presents for evaluation of altered mental status and decreased oral intake found to have COVID-19    #COVID-19 infection  With malaise, altered mental status, weakness.  Not hypoxic.  Lungs clear on x-ray.  Symptoms began within 24 hours prior to admission.  Not vaccinated.  4/26 family requested recheck of COVID, since all family members tested negative, and patient has not been leaving the house-she tested negative 4/26 and positive on 4/27.  4/26 likely false negative.  ID control recommended continuing isolation precautions.  D-dimer elevated.  CTA chest negative for PE, showed small bilateral pleural effusions.  Not hypoxic, thus no indication for Decadron.  Patient was seen by ID, recommended 3-day course of remdesivir.  4/28 D-dimer increased to 17 from 3.  No new respiratory symptoms.  Check for leg DVT.    #E. coli UTI.  Pansensitive E. coli.  S/p dose of Zosyn in ED, changed to Rocephin.    #Seizure disorder  From what I can tell this is likely posttraumatic, she has an intracranial calcification likely related to an old traumatic subdural hematoma  Continue home Depakote and Keppra, IV, till reliable p.o. today still intermittently declining p.o.    #Diabetes  On metformin at home, holding.  Low normal BG, D5 containing IVF till reliable p.o.  Family planning on bringing food from home.    #Hypokalemia  #Hypomag  Replacement protocols    #Age-indeterminate superior endplate compression fractures of T1 and T2  Sclerosis favoring subacute to chronic injury    #T9, T11, L1, L2, L3 compression fractures  Noted on abdominal CT  Uncertain chronicity  Patient won't be able to cooperate with MRI,  will obtain CT of T and L-spine.  Will involve neurosurgery if any abnormalities noted.  She is on alendronate and suspect primary care already aware, would discuss with family before additional workup.    #Mood, home Zoloft  #History of dementia without behavioral problems, per family.  Family requesting to call patient's daughter/PCA for interpreting, to avoid confusion, as patient thinks that  is additional family member.  OT evaluation.  #Hyperlipidemia, hold home simvastatin  #Osteoporosis, hold home alendronate  #Hypertension, hold home lisinopril  #Frailty.  Lives at home with daughter/PCA.  No recent falls.  Ambulates with a walker.  PT evaluation for disposition needs.  Protein calorie malnutrition, moderately severe.  Albumin low 2.8.Body mass index is 14.34 kg/m .  Normal TSH on 4/25/2022.  -Consult nutritionist       Diet: Combination Diet Moderate Consistent Carb (60 g CHO per Meal) Diet; Mechanical/Dental Soft Diet    DVT Prophylaxis: Enoxaparin (Lovenox) SQ  [place] Catheter: Not present  Central Lines: None  Cardiac Monitoring: None  Code Status: Full Code      Disposition Plan   Expected Discharge: 04/28/2022     Anticipated discharge location: 1-2 days     The patient's care was discussed with the Bedside Nurse, Care Coordinator/ and Patient.    July Patel MD  Hospitalist Service  [facility]  Securely message with the Vocera Web Console (learn more here)  Text page via [employer] 2000 Paging/Directory     Clinically Significant Risk Factors Present on Admission                ______________________________________________________________________    Interval History   Patient's daughter, PCA, assisted with translation, per family request.  Patient is alert and interactive today fluent speech.  Patient declined breakfast today, but after discussion with daughter, agreed to eat her oatmeal.  Family planning to bring 1 day full supply later today.  Labs and  images reviewed.    Data reviewed today: I reviewed all medications, new labs and imaging results over the last 24 hours. I personally reviewed    Physical Exam   Vital Signs: Temp: 98.2  F (36.8  C) Temp src: Oral BP: 135/76 Pulse: 72   Resp: 16 SpO2: 98 % O2 Device: None (Room air)    Weight: 71 lbs 0 oz  General: Demented frail elderly female lying in bed, left eye crusted shut, with slight left-sided facial droop, chronic.  Fluent speech.    HEENT: Head normocephalic atraumatic, oral mucosa moist. Sclerae anicteric  CV: Regular rhythm, normal rate, no murmurs  Resp: No wheezes, no rales or rhonchi, no focal consolidations  GI: Belly soft, nondistended, nontender, bowel sounds present  Skin: No rashes or lesions  Extremities: No peripheral edema  Psych: Flat affect, not in any distress  Neuro: Jaw tremor noted    Data   Recent Labs   Lab 04/27/22  0857 04/27/22  0837 04/27/22  0656 04/26/22  1207 04/26/22  0714 04/25/22  2306 04/25/22  1550   WBC  --   --  8.9  --  10.3  --  9.3   HGB  --   --  9.3*  --  9.9*  --  10.1*   MCV  --   --  96  --  95  --  93   PLT  --   --  196  --  211  --  211   INR  --   --   --   --   --   --  1.29*   NA  --   --  144  --  143  --  141   POTASSIUM  --   --  3.7  --  3.7  --  3.4*   CHLORIDE  --   --  110*  --  108*  --  104   CO2  --   --  22  --  23  --  25   BUN  --   --  10  --  13  --  18   CR  --   --  0.68  --  0.78  --  0.87   ANIONGAP  --   --  12  --  12  --  12   FLORES  --   --  8.1*  --  8.7  --  8.7   GLC 63* 60* 69*   < > 86   < > 115   ALBUMIN  --   --   --   --   --   --  3.0*   PROTTOTAL  --   --   --   --   --   --  7.9   BILITOTAL  --   --   --   --   --   --  0.6   ALKPHOS  --   --   --   --   --   --  98   ALT  --   --   --   --   --   --  11   AST  --   --   --   --   --   --  35   LIPASE  --   --   --   --   --   --  24    < > = values in this interval not displayed.     Recent Results (from the past 24 hour(s))   CT Chest Pulmonary Embolism w Contrast     Narrative    EXAM: CT CHEST PULMONARY EMBOLISM W CONTRAST  LOCATION: [facility]  DATE/TIME: 4/27/2022 11:15 AM    INDICATION: Shortness of breath.  COMPARISON: None.  TECHNIQUE: CT chest pulmonary angiogram during arterial phase injection of IV contrast. Multiplanar reformats and MIP reconstructions were performed. Dose reduction techniques were used.   CONTRAST: 75 mL Isovue-370.    FINDINGS:  ANGIOGRAM CHEST: No pulmonary embolism. Nonaneurysmal aorta without dissection.    LUNGS AND PLEURA: Motion artifact. Mild basilar atelectasis. No pleural effusions.    MEDIASTINUM/AXILLAE: No adenopathy. No pericardial effusion.    CORONARY ARTERY CALCIFICATION: Compromised by motion.    UPPER ABDOMEN: Stable calcified splenic artery aneurysm formation.    MUSCULOSKELETAL: Severe bony demineralization. Multilevel minimal to moderate compression fractures are identified, pronounced from T8 through the partially visualized lumbar spine.      Impression    IMPRESSION:    1.  No pulmonary embolism.    2.  Nonaneurysmal aorta without dissection.    3.  Mild basilar opacities are most suggestive motion artifact and atelectasis.    4.  Small bilateral pleural effusions.

## 2022-04-28 NOTE — PLAN OF CARE
Problem: Plan of Care - These are the overarching goals to be used throughout the patient stay.    Goal: Patient-Specific Goal (Individualized)  Outcome: Ongoing, Progressing   Calm and cooperative. Poor appetite. Takes room temperature juice. Blood sugar = 107 and 117. Talked to nutritionist to try supplement.  IV infiltrated. New IV started. Daughter called the unit and updated. Good urine out put from puerwick.  No S/S of pain.    Ally Murphy RN

## 2022-04-28 NOTE — PLAN OF CARE
Problem: Plan of Care - These are the overarching goals to be used throughout the patient stay.    Goal: Plan of Care Review/Shift Note  Description: The Plan of Care Review/Shift note should be completed every shift.  The Outcome Evaluation is a brief statement about your assessment that the patient is improving, declining, or no change.  This information will be displayed automatically on your shift note.  Outcome: Ongoing, Progressing     Problem: Plan of Care - These are the overarching goals to be used throughout the patient stay.    Goal: Absence of Hospital-Acquired Illness or Injury  Intervention: Identify and Manage Fall Risk  Recent Flowsheet Documentation  Taken 4/28/2022 0000 by Sofie Torres RN  Safety Promotion/Fall Prevention: bed alarm on  Taken 4/27/2022 2000 by Sofie Torres RN  Safety Promotion/Fall Prevention: bed alarm on  Intervention: Prevent Skin Injury  Recent Flowsheet Documentation  Taken 4/28/2022 0000 by Sofie Torres RN  Body Position:   position changed independently   turned  Taken 4/27/2022 2000 by Sofie Torres RN  Body Position:   position changed independently   turned  Intervention: Prevent and Manage VTE (Venous Thromboembolism) Risk  Recent Flowsheet Documentation  Taken 4/28/2022 0000 by Sofie Torres RN  Activity Management: activity adjusted per tolerance  Taken 4/27/2022 2000 by Sofie Torres RN  Activity Management: activity adjusted per tolerance  Goal: Optimal Comfort and Wellbeing  Intervention: Provide Person-Centered Care  Recent Flowsheet Documentation  Taken 4/28/2022 0000 by Sofie Torres RN  Trust Relationship/Rapport: care explained  Taken 4/27/2022 2000 by Sofie Torres RN  Trust Relationship/Rapport: care explained   Goal Outcome Evaluation:      VSS. Pt tolerating IV fluids and abx. No c/o pain. Sleeps between rounds.

## 2022-04-28 NOTE — PLAN OF CARE
Problem: Plan of Care - These are the overarching goals to be used throughout the patient stay.    Goal: Plan of Care Review/Shift Note  4/28/2022 0557 by Sofie Torres RN  Outcome: Ongoing, Progressing  4/28/2022 0158 by Sofie Torres RN  Outcome: Ongoing, Progressing     Problem: Plan of Care - These are the overarching goals to be used throughout the patient stay.    Goal: Absence of Hospital-Acquired Illness or Injury  Intervention: Identify and Manage Fall Risk  Recent Flowsheet Documentation  Taken 4/28/2022 0430 by Sofie Torres RN  Safety Promotion/Fall Prevention: bed alarm on  Taken 4/28/2022 0000 by Sofie Torres RN  Safety Promotion/Fall Prevention: bed alarm on  Taken 4/27/2022 2000 by Sofie Torres RN  Safety Promotion/Fall Prevention: bed alarm on  Intervention: Prevent Skin Injury  Recent Flowsheet Documentation  Taken 4/28/2022 0430 by Sofie Torres RN  Body Position:   position changed independently   turned  Taken 4/28/2022 0000 by Sofie Torres RN  Body Position:   position changed independently   turned  Taken 4/27/2022 2000 by Sofie Torres RN  Body Position:   position changed independently   turned  Intervention: Prevent and Manage VTE (Venous Thromboembolism) Risk  Recent Flowsheet Documentation  Taken 4/28/2022 0430 by Sofie Torres RN  Activity Management: activity adjusted per tolerance  Taken 4/28/2022 0000 by Sofie Torres RN  Activity Management: activity adjusted per tolerance  Taken 4/27/2022 2000 by Sofie Torres RN  Activity Management: activity adjusted per tolerance  Goal: Optimal Comfort and Wellbeing  Intervention: Provide Person-Centered Care  Recent Flowsheet Documentation  Taken 4/28/2022 0430 by Sofie Torres RN  Trust Relationship/Rapport: care explained  Taken 4/28/2022 0000 by Sofie Torres RN  Trust Relationship/Rapport: care explained  Taken 4/27/2022 2000 by Sofie Torres RN  Trust  Relationship/Rapport: care explained   Goal Outcome Evaluation:      Pt has slept through NOC without concern. VSS. Tolerating IV fluids and abx.

## 2022-04-29 NOTE — PLAN OF CARE
Physical Therapy Discharge Summary    Reason for therapy discharge:    Discharged to home.    Progress towards therapy goal(s). See goals on Care Plan in [facility] electronic health record for goal details.  Goals partially met.  Barriers to achieving goals:   discharge from facility.    Therapy recommendation(s):    No further therapy is recommended.      Cheyenne Samayoa, PT  4/29/2022

## 2022-04-29 NOTE — PLAN OF CARE
Goal Outcome Evaluation:    Plan of Care Reviewed With: daughter     Overall Patient Progress: improving

## 2022-04-29 NOTE — PLAN OF CARE
Goal Outcome Evaluation:  Pt slept well, intermittent confusion. No s/s of pain/discomfort. Pt up to the bathroom with assist of 1 staff.   Problem: Plan of Care - These are the overarching goals to be used throughout the patient stay.    Goal: Absence of Hospital-Acquired Illness or Injury  Intervention: Identify and Manage Fall Risk  Recent Flowsheet Documentation  Taken 4/29/2022 0332 by Derrek Bone RN  Safety Promotion/Fall Prevention: bed alarm on  Taken 4/28/2022 2317 by Derrek Bone RN  Safety Promotion/Fall Prevention: bed alarm on  Taken 4/28/2022 2000 by Derrek Bone RN  Safety Promotion/Fall Prevention: bed alarm on     Problem: Plan of Care - These are the overarching goals to be used throughout the patient stay.    Goal: Absence of Hospital-Acquired Illness or Injury  Intervention: Prevent Skin Injury  Recent Flowsheet Documentation  Taken 4/29/2022 0332 by Derrek Bone RN  Body Position: position changed independently  Taken 4/28/2022 2317 by Derrek Bone RN  Body Position: position changed independently  Taken 4/28/2022 2000 by Derrek Bone RN  Body Position: position changed independently     Problem: Plan of Care - These are the overarching goals to be used throughout the patient stay.    Goal: Absence of Hospital-Acquired Illness or Injury  Intervention: Prevent Infection  Recent Flowsheet Documentation  Taken 4/29/2022 0332 by Derrek Bone RN  Infection Prevention:    hand hygiene promoted    rest/sleep promoted  Taken 4/28/2022 2317 by Derrek Bone RN  Infection Prevention:    hand hygiene promoted    rest/sleep promoted  Taken 4/28/2022 2000 by Derrek Bone RN  Infection Prevention:    hand hygiene promoted    rest/sleep promoted     Problem: Plan of Care - These are the overarching goals to be used throughout the patient stay.    Goal: Optimal Comfort and Wellbeing  Intervention: Provide Person-Centered Care  Recent Flowsheet  Documentation  Taken 4/29/2022 0332 by Derrek Bone RN  Trust Relationship/Rapport:    care explained    thoughts/feelings acknowledged  Taken 4/28/2022 2317 by Derrek Bone RN  Trust Relationship/Rapport:    care explained    thoughts/feelings acknowledged  Taken 4/28/2022 2000 by Derrek Bone RN  Trust Relationship/Rapport:    care explained    thoughts/feelings acknowledged     Problem: Risk for Delirium  Goal: Optimal Coping  Outcome: Ongoing, Progressing     Problem: Risk for Delirium  Goal: Improved Sleep  Outcome: Ongoing, Progressing

## 2022-04-29 NOTE — DISCHARGE SUMMARY
[facility] MEDICINE  DISCHARGE SUMMARY     Primary Care Physician: Maria G Riojas  Admission Date: 4/25/2022   Discharge Provider: Jana Roe MD Discharge Date: 4/29/2022   Diet:   Active Diet and Nourishment Order   Procedures     Combination Diet Moderate Consistent Carb (60 g CHO per Meal) Diet; Mechanical/Dental Soft Diet     Diet       Code Status: Full Code   Activity: DCACTIVITY: Activity as tolerated        Condition at Discharge: Stable     REASON FOR PRESENTATION(See Admission Note for Details)     Encephalopathy    PRINCIPAL & ACTIVE DISCHARGE DIAGNOSES     Principal Problem:    Infection due to 2019 novel coronavirus  Active Problems:    Diabetes mellitus, type 2 (H)    Hypomagnesemia    Delirium    Seizure disorder (H)    Anemia      PENDING LABS     Unresulted Labs Ordered in the Past 30 Days of this Admission     Date and Time Order Name Status Description    4/25/2022  3:33 PM Blood Culture Line, venous Preliminary     4/25/2022  3:33 PM Blood Culture Line, venous Preliminary             PROCEDURES ( this hospitalization only)          RECOMMENDATIONS TO OUTPATIENT PROVIDER FOR F/U VISIT     Follow-up Appointments     Follow-up and recommended labs and tests       Follow up with primary care provider, Maria G Whitmore, within 7   days for hospital follow- up.  No follow up labs or test are needed.         Follow-up and recommended labs and tests       Follow up with primary care provider, Maria G Whitmore, within 7   days for hospital follow- up.  No follow up labs or test are needed.                 DISPOSITION     Home with home care    SUMMARY OF HOSPITAL COURSE:      Ivan Hook is a 81 year old female admitted on 4/25/2022. She has history of diabetes, hypertension, hyperlipidemia, peripheral neuropathy, previous traumatic subdural hematoma presented for evaluation of altered mental status and decreased oral intake found  to have COVID-19     #COVID-19 infection  With malaise, altered mental status, weakness.  Not hypoxic.  Lungs clear on x-ray.  Symptoms began within 24 hours prior to admission.  Not vaccinated.  4/26 family requested recheck of COVID, since all family members tested negative, and patient has not been leaving the house-she tested negative 4/26 and positive on 4/27.  4/26 likely false negative. ID control recommended continuing isolation precautions.  D-dimer elevated.  CTA chest negative for PE, showed small bilateral pleural effusions.  Not hypoxic, thus no indication for Decadron.  Patient was seen by ID, treated with 3-day course of remdesivir per ID recommendation.  -LE dopplers neg for DVT     #E. coli UTI.  -Pansensitive E. coli.  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 while here hence received IV. Per her daughter, pt does fine with swallowing pills at home.      #Diabetes  -Pt does better with her home food than hospital food.  -Resume PTA meds on discharge     #Hypokalemia  #Hypomag  Replacement protocols     #Age-indeterminate superior endplate compression fractures of T1 and T2  Sclerosis favoring subacute to chronic injury     #T9, T11, L1, L2, L3 compression fractures  Noted on abdominal CT  Uncertain chronicity  Patient has CT T and L spine which showed no acute findings.   She is on alendronate and suspect primary care already aware.     #Mood, home Zoloft  #History of dementia without behavioral problems, per family.  Family requesting to call patient's daughter/PCA for interpreting, to avoid confusion, as patient thinks that  is additional family member.    #Hyperlipidemia, resume PTA simvastatin  #Osteoporosis, resume home alendronate  #Hypertension, resume home  lisinopril  #Frailty.  Lives at home with daughter/PCA.  No recent falls.  Ambulates with a walker.  PT recommends discharge home with assist.    Protein calorie malnutrition, moderately severe.  Albumin low 2.8.Body mass index is 14.34 kg/m .  Normal TSH on 4/25/2022.Appreciate RD input    .Patient stable to be discharged home with home cares today. Please refer to discharge medications and instructions for more details.Discussed plan with patients daughter Arlyn over the phone on the day of discharge.         Discharge Medications with Med changes:     Current Discharge Medication List      CONTINUE these medications which have NOT CHANGED    Details   alendronate (FOSAMAX) 70 MG tablet Take 70 mg by mouth every 7 days      divalproex sodium delayed-release (DEPAKOTE) 250 MG DR tablet Take 250 mg by mouth 2 times daily      levETIRAcetam (KEPPRA) 250 MG tablet Take 250 mg by mouth 2 times daily      lisinopril (ZESTRIL) 2.5 MG tablet Take 2.5 mg by mouth daily      metFORMIN (GLUCOPHAGE) 850 MG tablet Take 850 mg by mouth daily      sertraline (ZOLOFT) 50 MG tablet Take 50 mg by mouth daily      simvastatin (ZOCOR) 10 MG tablet Take 10 mg by mouth At Bedtime                   Rationale for medication changes:      See med rec        Consults       CARE MANAGEMENT / SOCIAL WORK IP CONSULT  INFECTIOUS DISEASES IP CONSULT  OCCUPATIONAL THERAPY ADULT IP CONSULT  PHYSICAL THERAPY ADULT IP CONSULT  PHYSICAL THERAPY ADULT IP CONSULT  OCCUPATIONAL THERAPY ADULT IP CONSULT  NUTRITION SERVICES ADULT IP CONSULT    Immunizations given this encounter     Most Recent Immunizations   Administered Date(s) Administered     Influenza (High Dose) 3 valent vaccine 10/05/2016           Anticoagulation Information      Recent INR results:   Recent Labs   Lab 04/25/22  1550   INR 1.29*     Warfarin doses (if applicable) or name of other anticoagulant: N/A      SIGNIFICANT IMAGING FINDINGS     Results for orders placed or performed during  the hospital encounter of 04/25/22   XR Chest Port 1 View    Impression    IMPRESSION: Heart size and pulmonary vessels are normal. Lungs are clear.  Old right rib fractures.   CT Head w/o Contrast    Impression    IMPRESSION:  HEAD CT:  1.  No CT evidence for acute intracranial process.  2.  Brain atrophy and presumed chronic microvascular ischemic changes as above. Unchanged coarse calcification of the left frontal lobe.  3.  Inflammatory changes of the paranasal sinuses. Correlate with clinical evidence for sinusitis.    CERVICAL SPINE CT:  1.  Age-indeterminate superior endplate compression fractures at T1 and T2. Sclerosis of these levels favors subacute to chronic injury, but correlation with upper thoracic point tenderness is advised.  2.  Otherwise, no evidence for acute fracture or posttraumatic subluxation.  3.  Multilevel cervical spondylosis greatest at C3-C4 and C5-C6.   CT Cervical Spine w/o Contrast    Impression    IMPRESSION:  HEAD CT:  1.  No CT evidence for acute intracranial process.  2.  Brain atrophy and presumed chronic microvascular ischemic changes as above. Unchanged coarse calcification of the left frontal lobe.  3.  Inflammatory changes of the paranasal sinuses. Correlate with clinical evidence for sinusitis.    CERVICAL SPINE CT:  1.  Age-indeterminate superior endplate compression fractures at T1 and T2. Sclerosis of these levels favors subacute to chronic injury, but correlation with upper thoracic point tenderness is advised.  2.  Otherwise, no evidence for acute fracture or posttraumatic subluxation.  3.  Multilevel cervical spondylosis greatest at C3-C4 and C5-C6.   CT Abdomen Pelvis w/o Contrast    Impression    IMPRESSION:   1.  No acute process demonstrated in the abdomen and pelvis.  2.  New multilevel compression deformities of the lower thoracic and lumbar spine.  3.  Otherwise stable exam.     CT Chest Pulmonary Embolism w Contrast    Impression    IMPRESSION:    1.  No  pulmonary embolism.    2.  Nonaneurysmal aorta without dissection.    3.  Mild basilar opacities are most suggestive motion artifact and atelectasis.    4.  Small bilateral pleural effusions.        CT Lumbar Spine w/o Contrast    Impression    IMPRESSION:  THORACIC SPINE CT:  1.  Bones appear osteopenic.  2.  Numerous fractures throughout the thoracic spine with fractures in almost all the thoracic vertebral bodies. Most pronounced fractures are seen involving T8, T10, and T12. These fractures are all age indeterminate, but there is sclerosis at the sites   of fracture suggesting that they may be subacute in age. There are all unchanged since 04/27/2022.  3.  Posterior displacement of the fractured superior T8 endplate towards the spinal canal causes mild to moderate spinal canal narrowing. Posterior displacement of the fractured inferior T10 vertebral body causes mild spinal canal narrowing.    LUMBAR SPINE CT:  1.  Bones appear osteopenic.  2.  Small 12th ribs with 4 lumbar type vertebral bodies for the purposes of this dictation.  3.  Fracture deformities of the L1 and L2 vertebral body with anterior wedging as detailed above. There is sclerosis throughout these fractured vertebral bodies without definite acute lucent fracture line present. These fractures were present on CT   04/25/2022, but are new since 10/19/2019.  4.  Posterior displacement of the fractured superior L1 and inferior L2 endplates towards the spinal canal causes mild to moderate spinal canal narrowing.  5.  Degenerative changes throughout the lumbar spine, as detailed above, most pronounced at the L3-L4 level.     CT Thoracic Spine w/o Contrast    Impression    IMPRESSION:  THORACIC SPINE CT:  1.  Bones appear osteopenic.  2.  Numerous fractures throughout the thoracic spine with fractures in almost all the thoracic vertebral bodies. Most pronounced fractures are seen involving T8, T10, and T12. These fractures are all age indeterminate, but  there is sclerosis at the sites   of fracture suggesting that they may be subacute in age. There are all unchanged since 04/27/2022.  3.  Posterior displacement of the fractured superior T8 endplate towards the spinal canal causes mild to moderate spinal canal narrowing. Posterior displacement of the fractured inferior T10 vertebral body causes mild spinal canal narrowing.    LUMBAR SPINE CT:  1.  Bones appear osteopenic.  2.  Small 12th ribs with 4 lumbar type vertebral bodies for the purposes of this dictation.  3.  Fracture deformities of the L1 and L2 vertebral body with anterior wedging as detailed above. There is sclerosis throughout these fractured vertebral bodies without definite acute lucent fracture line present. These fractures were present on CT   04/25/2022, but are new since 10/19/2019.  4.  Posterior displacement of the fractured superior L1 and inferior L2 endplates towards the spinal canal causes mild to moderate spinal canal narrowing.  5.  Degenerative changes throughout the lumbar spine, as detailed above, most pronounced at the L3-L4 level.     US Lower Extremity Venous Duplex Bilateral    Impression    IMPRESSION:  1.  No deep venous thrombosis in the bilateral lower extremities.       SIGNIFICANT LABORATORY FINDINGS     US Lower Extremity Venous Duplex Bilateral  Result Date: 4/28/2022      IMPRESSION: 1.  No deep venous thrombosis in the bilateral lower extremities.    XR Chest Port 1 View  Result Date: 4/25/2022      IMPRESSION: Heart size and pulmonary vessels are normal. Lungs are clear. Old right rib fractures.    CT Abdomen Pelvis w/o Contrast  Result Date: 4/25/2022      IMPRESSION: 1.  No acute process demonstrated in the abdomen and pelvis. 2.  New multilevel compression deformities of the lower thoracic and lumbar spine. 3.  Otherwise stable exam.     CT Cervical Spine w/o Contrast  Result Date: 4/25/2022      IMPRESSION: HEAD CT: 1.  No CT evidence for acute intracranial process.  2.  Brain atrophy and presumed chronic microvascular ischemic changes as above. Unchanged coarse calcification of the left frontal lobe. 3.  Inflammatory changes of the paranasal sinuses. Correlate with clinical evidence for sinusitis. CERVICAL SPINE CT: 1.  Age-indeterminate superior endplate compression fractures at T1 and T2. Sclerosis of these levels favors subacute to chronic injury, but correlation with upper thoracic point tenderness is advised. 2.  Otherwise, no evidence for acute fracture or posttraumatic subluxation. 3.  Multilevel cervical spondylosis greatest at C3-C4 and C5-C6.    CT Chest Pulmonary Embolism w Contrast  Result Date: 4/27/2022      IMPRESSION: 1.  No pulmonary embolism. 2.  Nonaneurysmal aorta without dissection. 3.  Mild basilar opacities are most suggestive motion artifact and atelectasis. 4.  Small bilateral pleural effusions.     CT Head w/o Contrast  Result Date: 4/25/2022      IMPRESSION: HEAD CT: 1.  No CT evidence for acute intracranial process. 2.  Brain atrophy and presumed chronic microvascular ischemic changes as above. Unchanged coarse calcification of the left frontal lobe. 3.  Inflammatory changes of the paranasal sinuses. Correlate with clinical evidence for sinusitis. CERVICAL SPINE CT: 1.  Age-indeterminate superior endplate compression fractures at T1 and T2. Sclerosis of these levels favors subacute to chronic injury, but correlation with upper thoracic point tenderness is advised. 2.  Otherwise, no evidence for acute fracture or posttraumatic subluxation. 3.  Multilevel cervical spondylosis greatest at C3-C4 and C5-C6.    CT Lumbar Spine w/o Contrast  Result Date: 4/28/2022      IMPRESSION: THORACIC SPINE CT: 1.  Bones appear osteopenic. 2.  Numerous fractures throughout the thoracic spine with fractures in almost all the thoracic vertebral bodies. Most pronounced fractures are seen involving T8, T10, and T12. These fractures are all age indeterminate, but there is  sclerosis at the sites  of fracture suggesting that they may be subacute in age. There are all unchanged since 04/27/2022. 3.  Posterior displacement of the fractured superior T8 endplate towards the spinal canal causes mild to moderate spinal canal narrowing. Posterior displacement of the fractured inferior T10 vertebral body causes mild spinal canal narrowing. LUMBAR SPINE CT: 1.  Bones appear osteopenic. 2.  Small 12th ribs with 4 lumbar type vertebral bodies for the purposes of this dictation. 3.  Fracture deformities of the L1 and L2 vertebral body with anterior wedging as detailed above. There is sclerosis throughout these fractured vertebral bodies without definite acute lucent fracture line present. These fractures were present on CT 04/25/2022, but are new since 10/19/2019. 4.  Posterior displacement of the fractured superior L1 and inferior L2 endplates towards the spinal canal causes mild to moderate spinal canal narrowing. 5.  Degenerative changes throughout the lumbar spine, as detailed above, most pronounced at the L3-L4 level.     CT Thoracic Spine w/o Contrast  Result Date: 4/28/2022      IMPRESSION: THORACIC SPINE CT: 1.  Bones appear osteopenic. 2.  Numerous fractures throughout the thoracic spine with fractures in almost all the thoracic vertebral bodies. Most pronounced fractures are seen involving T8, T10, and T12. These fractures are all age indeterminate, but there is sclerosis at the sites  of fracture suggesting that they may be subacute in age. There are all unchanged since 04/27/2022. 3.  Posterior displacement of the fractured superior T8 endplate towards the spinal canal causes mild to moderate spinal canal narrowing. Posterior displacement of the fractured inferior T10 vertebral body causes mild spinal canal narrowing. LUMBAR SPINE CT: 1.  Bones appear osteopenic. 2.  Small 12th ribs with 4 lumbar type vertebral bodies for the purposes of this dictation. 3.  Fracture deformities of the  L1 and L2 vertebral body with anterior wedging as detailed above. There is sclerosis throughout these fractured vertebral bodies without definite acute lucent fracture line present. These fractures were present on CT 04/25/2022, but are new since 10/19/2019. 4.  Posterior displacement of the fractured superior L1 and inferior L2 endplates towards the spinal canal causes mild to moderate spinal canal narrowing. 5.  Degenerative changes throughout the lumbar spine, as detailed above, most pronounced at the L3-L4 level.       Discharge Orders        COVID-19 GetWell Loop Referral      COVID-19 GetWell Loop Referral      Home Care Referral      Reason for your hospital stay    Weakness, COVID     Contact provider    Contact your primary care provider if If increased trouble breathing, new arm/leg swelling, dizziness/passing out, falls, bleeding that doesn't stop, or uncontrolled pain.     Follow-up and recommended labs and tests     Follow up with primary care provider, Maria G Whitmore, within 7 days for hospital follow- up.  No follow up labs or test are needed.     Discharge - Quarantine/Isolation Instruction    Date of symptom onset:     Date of first positive test:  4/27/2022  If you tested positive COVID-19 and show symptoms (fever, cough, body aches or trouble breathing):        Stay home and away from others (self-isolate) until:        At least 10 days have passed since your symptoms started. AND...        You've had no fever-and no medicine that reduces fever for 1 full day (24 hours). AND...         Your other symptoms have resolved (gotten better).  If you tested positive for COVID-19 but don't show symptoms:       Stay home and away from others (self-isolate) until at least 10 days have passed since the date of your first positive COVID-19 test.     Activity    Your activity upon discharge: activity as tolerated     Reason for your hospital stay    Weakness, COVID, UTI     Contact provider     Contact your primary care provider if If increased trouble breathing, new arm/leg swelling, dizziness/passing out, falls, bleeding that doesn't stop, or uncontrolled pain.     Follow-up and recommended labs and tests     Follow up with primary care provider, Maria G Whitmore, within 7 days for hospital follow- up.  No follow up labs or test are needed.     Discharge - Quarantine/Isolation Instruction    Date of symptom onset:     Date of first positive test: 4/27/2022  If you tested positive COVID-19 and show symptoms (fever, cough, body aches or trouble breathing):        Stay home and away from others (self-isolate) until:        At least 10 days have passed since your symptoms started. AND...        You've had no fever-and no medicine that reduces fever for 1 full day (24 hours). AND...         Your other symptoms have resolved (gotten better).  If you tested positive for COVID-19 but don't show symptoms:       Stay home and away from others (self-isolate) until at least 10 days have passed since the date of your first positive COVID-19 test.     Diet    Follow this diet upon discharge: Orders Placed This Encounter      Combination Diet Moderate Consistent Carb (60 g CHO per Meal) Diet; Mechanical/Dental Soft Diet       Examination   Physical Exam   Temp:  [97.6  F (36.4  C)-98.3  F (36.8  C)] 98.3  F (36.8  C)  Pulse:  [69-99] 99  Resp:  [18] 18  BP: (116-142)/(55-70) 124/55  SpO2:  [98 %-99 %] 99 %  Wt Readings from Last 1 Encounters:   04/26/22 32.2 kg (71 lb)     General: Demented frail elderly female sitting up in chair, NAD.     HEENT: Head normocephalic atraumatic, oral mucosa moist. Sclerae anicteric  CV: Regular rhythm, normal rate  Resp: No wheezes, no rales or rhonchi, no focal consolidations  GI: Soft, nondistended, nontender, bowel sounds present  Skin: No rashes or lesions  Extremities: No peripheral edema  Psych: Flat affect, not in any distress  Neuro: Awake, alert, speech +, Ext resting  tremors noted+        Please see EMR for more detailed significant labs, imaging, consultant notes etc.    I, Jana Roe MD, personally saw the patient today and spent greater than 30 minutes discharging this patient.    Jana Roe MD  [facility]    CC:Maria G Riojas

## 2022-05-01 NOTE — PROGRESS NOTES
Clinic Care Coordination Contact  [facility]: Post-Discharge Note  SITUATION                                                      Admission:    Admission Date: 04/25/22   Reason for Admission: Encephalopathy  Discharge:   Discharge Date: 04/29/22  Discharge Diagnosis: Diabetes mellitus, type 2, Hypomagnesemia    Delirium    BACKGROUND                                                      Per hospital discharge summary and inpatient provider notes:    Ivan Hook is a 81 year old female admitted on 4/25/2022. She has history of diabetes, hypertension, hyperlipidemia, peripheral neuropathy, previous traumatic subdural hematoma presents for evaluation of altered mental status and decreased oral intake found to have COVID-19     #COVID-19 viral illness  With malaise, altered mental status, weakness.  Not hypoxic.  Lungs clear on x-ray.  Symptoms began within the last 24 hours.  Not vaccinated.  Started on remdesivir and decadron from ED.  Continue remdesivir, plan 3-day course.  Don't currently see an indication to continue decadron, so hold on further doses.  Will ask ID to see as I feel she is high risk to decompensate and may benefit from mAb.     #UTI  Zosyn started in ED, change to rocephin and await culture     #Seizure disorder  From what I can tell this is likely posttraumatic, she has an intracranial calcification likely related to an old traumatic subdural hematoma  Continue home Depakote and Keppra  If she will not take them by mouth then we can change to IV     #Diabetes  On metformin at home  Hold metformin, use sliding scale    ASSESSMENT           Discharge Assessment  How are you doing now that you are home?: good  How are your symptoms? (Red Flag symptoms escalate to triage hotline per guidelines): Improved  Do you feel your condition is stable enough to be safe at home until your provider visit?: Yes  Does the patient have their discharge instructions? : Yes  Does the patient have questions  regarding their discharge instructions? : No  Were you started on any new medications or were there changes to any of your previous medications? : No  Does the patient have all of their medications?: Yes  Do you have questions regarding any of your medications? : No  Do you have all of your needed medical supplies or equipment (DME)?  (i.e. oxygen tank, CPAP, cane, etc.): Yes  Discharge follow-up appointment scheduled within 14 calendar days? : No           PLAN                                                      Outpatient Plan:  Follow-up and recommended labs and tests  Follow up with primary care provider, Maria G Whitmore, within 7 days for hospital follow- up. No follow up labs  or test are needed.  Follow-up and recommended labs and tests  Follow up with primary care provider, Maria G Whitmore, within 7 days for hospital follow- up. No follow up labs  or test are needed.    No future appointments.      For any urgent concerns, please contact our 24 hour nurse triage line: [phone] ([phone])         Mary Shaffer MA

## 2022-06-24 PROBLEM — R62.7 FAILURE TO THRIVE IN ADULT: Status: ACTIVE | Noted: 2022-01-01

## 2022-06-24 NOTE — ED NOTES
EMERGENCY DEPARTMENT SIGN OUT NOTE        ED COURSE AND MEDICAL DECISION MAKING  Patient was signed out to me by Dr Maldonado Washington at 3:00 PM  5:40 PM I rechecked and updated the patient   6:05 PM Spoke to [facility] resident who accepts the patient for admission     In brief, Ivan Hook is a 82 year old female who initially presented for evaluation of failure to thrive.      Per EMS, family called EMS for failure to thrive. Patient has not been eating or interacting at home.    At time of sign out, disposition was pending UA and admission.    I spoke with the patient's daughter using professional [employer]  to obtain history.    Daughter reports that patient has not been eating or drinking for the past few days. She normally communicates by nodding and speaks a little, but has not been interactive / communicative for the past few days. She has had difficulty swallowing as well. No vomiting, diarrhea, cough or fevers.      Labs remarkable for mild hypomagnesemia (1.6 - replaced by IV) with renal insufficiency (creatinine 1.34 with GFR 40) - likely due to poor po intake and dehydration. UA negative for infection. Head CT negative.    Patient admitted to [facility] for further evaluation management.  Patient hemodynamically stable throughout ED course.    FINAL IMPRESSION    1. Failure to thrive in adult        ED MEDS  Medications   sertraline (ZOLOFT) tablet 50 mg (50 mg Oral Given 6/25/22 0907)   simvastatin (ZOCOR) tablet 10 mg (10 mg Oral Not Given 6/25/22 0059)   lisinopril (ZESTRIL) tablet 2.5 mg (2.5 mg Oral Given 6/25/22 0908)   levETIRAcetam (KEPPRA) tablet 250 mg (250 mg Oral Given 6/25/22 0907)   divalproex sodium delayed-release (DEPAKOTE) DR tablet 250 mg (250 mg Oral Given 6/25/22 0908)   melatonin tablet 1 mg (has no administration in time range)   ondansetron (ZOFRAN ODT) ODT tab 4 mg (has no administration in time range)     Or   ondansetron (ZOFRAN) injection 4 mg (has no  administration in time range)   acetaminophen (TYLENOL) tablet 650 mg (has no administration in time range)     Or   acetaminophen (TYLENOL) Suppository 650 mg (has no administration in time range)   prochlorperazine (COMPAZINE) injection 5 mg (has no administration in time range)     Or   prochlorperazine (COMPAZINE) tablet 5 mg (has no administration in time range)     Or   prochlorperazine (COMPAZINE) suppository 12.5 mg (has no administration in time range)   sodium phosphate (FLEET ENEMA) 1 enema (has no administration in time range)   bisacodyl (DULCOLAX) Suppository 10 mg (has no administration in time range)   polyethylene glycol (MIRALAX) Packet 17 g (has no administration in time range)   bisacodyl (DULCOLAX) EC tablet 5 mg (has no administration in time range)     Or   bisacodyl (DULCOLAX) EC tablet 10 mg (has no administration in time range)   polyethylene glycol (MIRALAX) Packet 17 g (17 g Oral Given 6/25/22 0908)   lidocaine 1 % 0.1-1 mL (has no administration in time range)   lidocaine (LMX4) cream (has no administration in time range)   sodium chloride (PF) 0.9% PF flush 3 mL (has no administration in time range)   sodium chloride (PF) 0.9% PF flush 3 mL (3 mLs Intracatheter Not Given 6/25/22 0911)   glucose gel 15-30 g (has no administration in time range)     Or   dextrose 50 % injection 25-50 mL (has no administration in time range)     Or   glucagon injection 1 mg (has no administration in time range)   sodium chloride 0.9% infusion (1,000 mLs Intravenous New Bag 6/25/22 0912)   0.9% sodium chloride BOLUS (0 mLs Intravenous Stopped 6/24/22 1854)   magnesium sulfate 2 g in water intermittent infusion (0 g Intravenous Stopped 6/24/22 2023)       LAB  Labs Ordered and Resulted from Time of ED Arrival to Time of ED Departure   COMPREHENSIVE METABOLIC PANEL - Abnormal       Result Value    Sodium 141      Potassium 4.0      Chloride 102      Carbon Dioxide (CO2) 25      Anion Gap 14      Urea Nitrogen  21      Creatinine 1.34 (*)     Calcium 9.8      Glucose 106      Alkaline Phosphatase 74      AST 17      ALT <9      Protein Total 8.1 (*)     Albumin 3.6      Bilirubin Total 0.7      GFR Estimate 40 (*)    MAGNESIUM - Abnormal    Magnesium 1.6 (*)    CBC WITH PLATELETS AND DIFFERENTIAL - Abnormal    WBC Count 12.0 (*)     RBC Count 3.64 (*)     Hemoglobin 11.7      Hematocrit 35.8      MCV 98      MCH 32.1      MCHC 32.7      RDW 13.2      Platelet Count 81 (*)     % Neutrophils 65      % Lymphocytes 23      % Monocytes 11      % Eosinophils 0      % Basophils 0      % Immature Granulocytes 1      NRBCs per 100 WBC 0      Absolute Neutrophils 8.0      Absolute Lymphocytes 2.7      Absolute Monocytes 1.3      Absolute Eosinophils 0.0      Absolute Basophils 0.0      Absolute Immature Granulocytes 0.1      Absolute NRBCs 0.0     TROPONIN I - Normal    Troponin I <0.01     COVID-19 VIRUS (CORONAVIRUS) BY PCR - Normal    SARS CoV2 PCR Negative         EKG  Normal sinus rhythm.  Rate of 99.  First-degree block.  No acute ST segment abnormalities.  I have independently reviewed and interpreted the EKG(s) documented above.    RADIOLOGY    XR Chest Port 1 View   Final Result   IMPRESSION: There is new minimal blunting of the left cardiophrenic angle likely base of minimal pleural fluid or thickening. Otherwise the chest is stable with again seen partially calcified thoracic aorta. Old right rib fractures. Postoperative changes    right upper quadrant most typical for cholecystectomy. No active CHF, pneumothorax, or inflammatory infiltrates identified.      Head CT w/o contrast   Final Result   IMPRESSION:   1.  No acute intracranial abnormality.      2.  Moderate diffuse parenchymal volume loss with a moderate to severe burden chronic small vessel ischemic change is unchanged. Coarse calcification in the parasagittal left superior frontal gyrus is unchanged.          DISCHARGE MEDS  Current Discharge Medication List           Natalee Thornton MD  [facility] EMERGENCY DEPARTMENT  [facility] [facility] [id number]  [phone]     Natalee Thornton MD  06/25/22 8137

## 2022-06-24 NOTE — ED PROVIDER NOTES
EMERGENCY DEPARTMENT ENCOUNTER      NAME: Ivan Hook  AGE: 82 year old female  YOB: 1940  MRN: [id number]  EVALUATION DATE & TIME: No admission date for patient encounter.    PCP: No primary care provider on file.    ED PROVIDER: Maldonado Washington M.D.      Chief Complaint   Patient presents with     Failure to Thrive         FINAL IMPRESSION:  Failure to thrive  Cachexia    ED COURSE & MEDICAL DECISION MAKING:    Pertinent Labs & Imaging studies reviewed. (See chart for details)  82 year old female presents to the Emergency Department for evaluation of decreased activities, appetite.  Per EMS report patient has not been interacting with her family and refusing to eat.  Uncertain as to length symptoms have been ongoing.  Family members not present.  Patient is a cachectic elderly female laying in a semifetal position.  She has opacification of the left cornea.  Right pupil reactive.  Mucous membranes somewhat dry.  Respirations unlabored and lungs clear.  Card exam unremarkable.  Abdomen soft and scaphoid.  No lower extremity edema.  Neurologically patient looks about the room occasionally.  Does move extremities with stimulation.  We will proceed with laboratory evaluation assess for electrolyte imbalance, anemia.  Also search for potential infectious process.  Intravenous fluids also being initiated given clinical evidence of dehydration patient   11:55 AM I met with the patient for the initial interview and physical examination. Discussed plan for treatment and workup in the ED.    1:29 PM.  Magnesium slightly reduced at 1.6.  Minimal leukocytosis.  Mild elevation creatinine 1.34.  Troponin normal 0.01.  Urine analysis pending.  Patient will require hospitalization given her presentation.  Will obtain CT imaging out of caution.   2:53 PM.  Magnesium reduced.  Supplemental magnesium ordered.  Awaiting urine analysis.  Patient will require hospitalization.  Patient discussed with Dr. Thornton at end of  shift   At the conclusion of the encounter I discussed the results of all of the tests and the disposition. The questions were answered and return precautions provided. The patient or family acknowledged understanding and was agreeable with the care plan.       PPE: Provider wore gloves, N95 mask, surgical cap     MEDICATIONS GIVEN IN THE EMERGENCY:  Medications   0.9% sodium chloride BOLUS (has no administration in time range)   sodium chloride 0.9% infusion (has no administration in time range)       NEW PRESCRIPTIONS STARTED AT TODAY'S ER VISIT  New Prescriptions    No medications on file          =================================================================    HPI    Patient information was obtained from: EMS    Use of Intrepreter: N/A         Ivan Hook is a 82 year old female with a pertient medical history of nonverbal who presents to the ED for evaluation of failure to thrive    UNABLE TO OBTAIN HISTORY PER PATIENT SECONDARY TO NONVERBAL    Per EMS, family called EMS for failure to thrive. Patient has not been eating or interacting at home.    REVIEW OF SYSTEMS  UNABLE TO OBTAIN ROS PER PATIENT SECONDARY TO NONVERBAL     PAST MEDICAL HISTORY:  History reviewed. No pertinent past medical history.    PAST SURGICAL HISTORY:  History reviewed. No pertinent surgical history.      CURRENT MEDICATIONS:      Current Facility-Administered Medications:      0.9% sodium chloride BOLUS, 1,000 mL, Intravenous, Once, Maldonado Washington MD     sodium chloride 0.9% infusion, 1,000 mL, Intravenous, Continuous, Maldonado Washington MD  No current outpatient medications on file.    ALLERGIES:  Not on File    FAMILY HISTORY:  History reviewed. No pertinent family history.    SOCIAL HISTORY:        VITALS:  Patient Vitals for the past 24 hrs:   BP Temp Temp src Pulse Resp SpO2   06/24/22 1104 120/76 98.2  F (36.8  C) Tympanic 93 16 97 %        PHYSICAL EXAM    Constitutional:  Awake, alert, in no apparent distress. Extremely  cachetic appearing.   HENT:  Normocephalic, Atraumatic. Left eye cornea milky. Mucous membranes tachycardic. Nose normal. Neck- Normal range of motion with no guarding, No midline cervical tenderness, Supple, No stridor.   Eyes:  PERRL, EOMI with no signs of entrapment, Conjunctiva normal, No discharge.   Respiratory:  Normal breath sounds, No respiratory distress, No wheezing.    Cardiovascular:  Normal heart rate, Normal rhythm, No appreciable rubs or gallops.   GI:  Soft, No tenderness, No distension, No palpable masses  Musculoskeletal:  Intact distal pulses, No edema. Good range of motion in all major joints. No tenderness to palpation or major deformities noted.  Integument:  Warm, Dry, No erythema, No rash.   Neurologic:  Alert & oriented, Normal sensory function, No focal deficits noted. Looking about the room, moving extremities weakly.  Psychiatric:  Affect normal, Judgment normal, Mood normal.     LAB:  All pertinent labs reviewed and interpreted.  Results for orders placed or performed during the hospital encounter of 06/24/22   Head CT w/o contrast     Status: None    Narrative    EXAM: CT HEAD W/O CONTRAST  LOCATION: [facility]  DATE/TIME: 6/24/2022 1:55 PM    INDICATION: Failure to thrive decreased activity, weakness  COMPARISON: 04/25/2022 head CT. 07/26/2020 head CT and 10/19/2019 head CT.  TECHNIQUE: Routine CT Head without IV contrast. Multiplanar reformats. Dose reduction techniques were used.    FINDINGS:  INTRACRANIAL CONTENTS: No intracranial hemorrhage, extraaxial collection, or mass effect.  No CT evidence of acute infarct. Unchanged coarse calcification in the parasagittal left superior frontal gyrus. Moderate to severe burden chronic small vessel   ischemic change is unchanged. Moderate generalized volume loss. No hydrocephalus.     VISUALIZED ORBITS/SINUSES/MASTOIDS: Prior bilateral cataract surgery. Visualized portions of the orbits are otherwise unremarkable.  Near-complete opacification of the right maxillary sinus with mild opacification in the posterior left ethmoid air cells.   Opacified left sphenoid sinus. No middle ear or mastoid effusion.    BONES/SOFT TISSUES: No acute abnormality.      Impression    IMPRESSION:  1.  No acute intracranial abnormality.    2.  Moderate diffuse parenchymal volume loss with a moderate to severe burden chronic small vessel ischemic change is unchanged. Coarse calcification in the parasagittal left superior frontal gyrus is unchanged.   Comprehensive metabolic panel     Status: Abnormal   Result Value Ref Range    Sodium 141 136 - 145 mmol/L    Potassium 4.0 3.5 - 5.0 mmol/L    Chloride 102 98 - 107 mmol/L    Carbon Dioxide (CO2) 25 22 - 31 mmol/L    Anion Gap 14 5 - 18 mmol/L    Urea Nitrogen 21 8 - 28 mg/dL    Creatinine 1.34 (H) 0.60 - 1.10 mg/dL    Calcium 9.8 8.5 - 10.5 mg/dL    Glucose 106 70 - 125 mg/dL    Alkaline Phosphatase 74 45 - 120 U/L    AST 17 0 - 40 U/L    ALT <9 0 - 45 U/L    Protein Total 8.1 (H) 6.0 - 8.0 g/dL    Albumin 3.6 3.5 - 5.0 g/dL    Bilirubin Total 0.7 0.0 - 1.0 mg/dL    GFR Estimate 40 (L) >60 mL/min/1.73m2   Troponin I     Status: Normal   Result Value Ref Range    Troponin I <0.01 0.00 - 0.29 ng/mL   Magnesium     Status: Abnormal   Result Value Ref Range    Magnesium 1.6 (L) 1.8 - 2.6 mg/dL   CBC with platelets and differential     Status: Abnormal   Result Value Ref Range    WBC Count 12.0 (H) 4.0 - 11.0 10e3/uL    RBC Count 3.64 (L) 3.80 - 5.20 10e6/uL    Hemoglobin 11.7 11.7 - 15.7 g/dL    Hematocrit 35.8 35.0 - 47.0 %    MCV 98 78 - 100 fL    MCH 32.1 26.5 - 33.0 pg    MCHC 32.7 31.5 - 36.5 g/dL    RDW 13.2 10.0 - 15.0 %    Platelet Count 81 (L) 150 - 450 10e3/uL    % Neutrophils 65 %    % Lymphocytes 23 %    % Monocytes 11 %    % Eosinophils 0 %    % Basophils 0 %    % Immature Granulocytes 1 %    NRBCs per 100 WBC 0 <1 /100    Absolute Neutrophils 8.0 1.6 - 8.3 10e3/uL    Absolute Lymphocytes 2.7  0.8 - 5.3 10e3/uL    Absolute Monocytes 1.3 0.0 - 1.3 10e3/uL    Absolute Eosinophils 0.0 0.0 - 0.7 10e3/uL    Absolute Basophils 0.0 0.0 - 0.2 10e3/uL    Absolute Immature Granulocytes 0.1 <=0.4 10e3/uL    Absolute NRBCs 0.0 10e3/uL   ECG 12-LEAD WITH MUSE (LHE)     Status: None   Result Value Ref Range    Systolic Blood Pressure  mmHg    Diastolic Blood Pressure  mmHg    Ventricular Rate 99 BPM    Atrial Rate 99 BPM    TX Interval 196 ms    QRS Duration 60 ms     ms    QTc 446 ms    P Axis 68 degrees    R AXIS 12 degrees    T Axis 20 degrees    Interpretation ECG       * Poor data quality, interpretation may be adversely affected  Sinus rhythm with Premature atrial complexes  Low voltage QRS  Cannot rule out Anteroseptal infarct , age undetermined  Abnormal ECG  No previous ECGs available  Confirmed by SEE ED PROVIDER NOTE FOR, ECG INTERPRETATION ([id number]),  SILVA BLACKWELL ([id number]) on 6/24/2022 2:16:33 PM     CBC with platelets + differential     Status: Abnormal    Narrative    The following orders were created for panel order CBC with platelets + differential.  Procedure                               Abnormality         Status                     ---------                               -----------         ------                     CBC with platelets and d...[[id number]]  Abnormal            Final result                 Please view results for these tests on the individual orders.     RADIOLOGY:  Reviewed all pertinent imaging. Please see official radiology report.  No orders to display       EKG:    Normal sinus rhythm.  Rate of 99.  First-degree block.  No acute ST segment abnormalities.  I have independently reviewed and interpreted the EKG(s) documented above.          I, Anna Cantu, am serving as a scribe to document services personally performed by Maldonado Washington MD, based on my observation and the provider's statements to me. I, Maldonado Washington MD attest that Anna Cantu is acting in a  vikas judge, has observed my performance of the services and has documented them in accordance with my direction.    Maldonado Washington M.D.  Emergency Medicine  [facility] EMERGENCY DEPARTMENT     Maldonado Washington MD  06/24/22 0984

## 2022-06-24 NOTE — H&P
Admission History and Physical   Ivan Hook,  1940, MRN [id number]    [unfilled]  No admission diagnoses are documented for this encounter.    PCP: Jl Kohler, [unfilled], [phone]   Code status:  No CPR- Do NOT Intubate       Extended Emergency Contact Information  Primary Emergency Contact: Arlyn Kohler  Mobile Phone: [phone]  Relation: Daughter       Assessment and Plan   Active Problems:    Failure to thrive in adult    Ivan Hook is a 82 year old female admitted on 2022. She has history of diabetes, hypertension, hyperlipidemia, peripheral neuropathy, previous traumatic subdural hematoma, and seizure and presents for evaluation of decreased responsiveness and decreased oral intake.    Failure to thrive  Decreased oral intake  Decreased responsiveness  No obvious specific cause.  Patient does not appear to have an obvious infection although WBC is mildly elevated.  COVID-19 negative.  UA negative for nitrates and LE.  CT head without acute pathology.  No indication for immediate antibiotics though will obtain cultures and reassess.  Exam overall very benign and nonfocal except that she is not interactive.  -IVF.  -PT/OT.  -Trend troponin x3 total.  -CXR.  -Blood cultures.  Urine culture.  -Low threshold for antibiotics if WBC increases in the morning or if she becomes febrile.    SEVERIANO  Presumably prerenal in the setting of decreased oral intake.  Creatinine 1.34 on admission, baseline around 0.6.  -IVF, Daily BMP.    Hypomagnesemia  In the setting of decreased oral intake.  On the protocol.    Thrombocytopenia  No evidence of acute bleed.  Her platelet count was normal back in April so this is new.  -Peripheral smear, PTT, INR.    DM2  Last A1c 5.7 2022.  Not even consistent with diabetes.  In light of this, we will hold metformin while inpatient and could consider stopping this altogether on discharge.  -Blood sugar checks twice daily.  Can back these off if they remain  normal.    History of SDH  History of seizure  Continue Keppra and Depakote.  No evidence of recurrent seizure and none was witnessed at home by family although could consider postictal state as an etiology as it sounds like the patient probably has not been taking her medications consistently and certainly has not been in the last couple of days.  Her subdural hematomas have resolved and there is no evidence of these on CT from today along with no other evidence of acute pathology.  There is a moderate to severe burden of chronic small vessel ischemic change with a lot of volume loss that would suggest vascular dementia in the context of her other symptoms.    Chronic, stable conditions:  HLD: Continue simvastatin.  Mental health: Continue sertraline.  Osteoporosis: hold alendronate.  History of compression fractures.  No obvious new fractures.  HTN: BP WNL.  Continue lisinopril.    Medication Reconciliation Status: Completed.    FEN: Diabetic diet.  DVT Prophylaxis: SCDs.  Code: DNR/DNI.    Dispo: observation status for Failure to thrive, anticipate discharge to Home versus facility.   Barriers to discharge:  work up in progress   Anticipated discharge date:  1-2 days     Chief Complaint:  Decreased oral intake.     HPI:    Ivan Hook is a 82 year old female admitted on 6/24/2022. She has history of diabetes, hypertension, hyperlipidemia, peripheral neuropathy, previous traumatic subdural hematoma, and seizure and presents for evaluation of decreased responsiveness and decreased oral intake.     Per patient's daughter, patient has not been acting like her usual self for the last couple of days and has not been eating.  She typically is not extremely interactive and is not really able to carry on a conversation or interact meaningfully with her family members, but normally will at least eat food if it is late out in front of her.  The last couple of days, she has not been willing to eat the food and has not taken her  medications.  She does not have any other obvious symptoms but the family brought her in to be evaluated because of their concern regarding this.  They have not noticed any seizures.    Patient's daughter does remember that the patient has expressed wishes not to have electric shocks or a tube down her throat with mechanical ventilation in the hospital and thought that no CPR is probably a reasonable extension of these wishes after our discussion.    History is provided by patient's family.     Medical History  As above. Surgical History  Reviewed and She  has no past surgical history on file.   Social History  Reviewed and she     Allergies  No Known Allergies Family History  Reviewed and noncontributory except as noted below or in HPI.        Prior to Admission Medications   Medications Prior to Admission   Medication Sig Dispense Refill Last Dose     alendronate (FOSAMAX) 70 MG tablet Take 70 mg by mouth every 7 days   Past Week at Unknown time     divalproex sodium delayed-release (DEPAKOTE) 250 MG DR tablet Take 250 mg by mouth 2 times daily   Past Week at Unknown time     levETIRAcetam (KEPPRA) 250 MG tablet Take 250 mg by mouth 2 times daily   Past Week at Unknown time     lisinopril (ZESTRIL) 2.5 MG tablet Take 2.5 mg by mouth daily   Past Week at Unknown time     metFORMIN (GLUCOPHAGE) 850 MG tablet Take 850 mg by mouth 2 times daily (with meals)   Past Week at Unknown time     sertraline (ZOLOFT) 50 MG tablet Take 50 mg by mouth daily   Past Week at Unknown time     simvastatin (ZOCOR) 10 MG tablet Take 10 mg by mouth At Bedtime   Past Week at Unknown time        Review of Systems:  Complete review of systems negative except as otherwise noted in HPI.     Physical Exam:  Temp:  [98.2  F (36.8  C)-98.7  F (37.1  C)] 98.7  F (37.1  C)  Pulse:  [71-99] 71  Resp:  [16-17] 17  BP: (118-140)/(63-77) 118/63  SpO2:  [95 %-97 %] 95 %    General appearance: Cachectic, does not interact, NAD.  Head: Normocephalic,  atraumatic.  Eyes: conjunctivae/corneas clear.  Throat: MMM.  Neck: supple, symmetrical, trachea midline.  Lungs: clear to auscultation bilaterally, no increased respiratory effort.  Heart: regular rate and rhythm, no murmurs, appropriately perfused.  Abdomen: Nondistended, soft, non-tender; no masses, no organomegaly.  Extremities: extremities atraumatic, no cyanosis, no edema, radial and DP pulses palpable bilaterally.  Skin: Skin color, texture, turgor normal. No rashes or lesions.  Neurologic: Normal tone.  Alerts to voice but does not interact.  Psychiatric: Unable to assess.     Pertinent Labs  Lab Results: personally reviewed.   @24HOURRESULTS@    Pertinent Radiology  Radiology Results: Personally reviewed image/s and personally reviewed impression/s  [unfilled]    This note was created with the use of voice recognition dictation technology, and as such there may be unintended typographical or voice recognition errors that were not corrected during proofreading.    ECG Results: Sinus rhythm with PACs.  No obvious acute pathology.    Precepted patient with Dr. Sejal Toussaint MD  [facility] Program, PGY-3

## 2022-06-24 NOTE — ED NOTES
"Daughter came and gave update to RN    Patient has not been feeling well for 2 days with no eating or drinking  Patient in incontinent of bowel and bladder with a reported urine incontinent before coming to the ED. Nurse Intern just changed pad and it was dry.    of [facility] needed, as patient does not speak english.  History of demnia of a year   Before these days of being sick, patient was walking around home with a walker and was talking normally with family members, noted that yesterday she only shook her head \"yes/no\"    Daughter reports that she has medical problems, though she doesn't know what. She also takes medication, but didn't bring that list.   Before, she was eating with things cut up/ pureed   "

## 2022-06-24 NOTE — ED TRIAGE NOTES
patient brought in via EMS for failure to thrive, patient is nonverbal, family states she has not been eating or interacting at home.

## 2022-06-25 NOTE — PHARMACY-ADMISSION MEDICATION HISTORY
Pharmacy Note - Admission Medication History    Pertinent Provider Information: none     ______________________________________________________________________    Prior To Admission (PTA) med list completed and updated in EMR.       PTA Med List   Medication Sig Last Dose     alendronate (FOSAMAX) 70 MG tablet Take 70 mg by mouth every 7 days Past Week at Unknown time     divalproex sodium delayed-release (DEPAKOTE) 250 MG DR tablet Take 250 mg by mouth 2 times daily Past Week at Unknown time     levETIRAcetam (KEPPRA) 250 MG tablet Take 250 mg by mouth 2 times daily Past Week at Unknown time     lisinopril (ZESTRIL) 2.5 MG tablet Take 2.5 mg by mouth daily Past Week at Unknown time     metFORMIN (GLUCOPHAGE) 850 MG tablet Take 850 mg by mouth 2 times daily (with meals) Past Week at Unknown time     sertraline (ZOLOFT) 50 MG tablet Take 50 mg by mouth daily Past Week at Unknown time     simvastatin (ZOCOR) 10 MG tablet Take 10 mg by mouth At Bedtime Past Week at Unknown time       Information source(s): Family member and CareEverywhere/SureScripts  Method of interview communication: phone    Summary of Changes to PTA Med List  New: none  Discontinued: none  Changed: none    Patient was asked about OTC/herbal products specifically.  PTA med list reflects this.    In the past week, patient estimated taking medication this percent of the time:  50-90% due to other.    Allergies were reviewed, assessed, and updated with the patient.      Patient does not use any multi-dose medications prior to admission.    The information provided in this note is only as accurate as the sources available at the time of the update(s).    Thank you for the opportunity to participate in the care of this patient.    Arlyn Foley [facility]  6/24/2022 7:33 PM

## 2022-06-25 NOTE — PROGRESS NOTES
[facility]    Progress Note - Hospitalist Service       Date of Admission:  6/24/2022    Assessment & Plan          Ivan Hook is a 82 year old female admitted on 6/24/2022. She has history of diabetes, hypertension, hyperlipidemia, peripheral neuropathy, previous traumatic subdural hematoma, and seizure and presents for evaluation of decreased responsiveness and decreased oral intake.     Failure to thrive  Decreased oral intake  Decreased responsiveness  No obvious specific cause.  Patient does not appear to have an obvious infection although WBC is mildly elevated.  COVID-19 negative.  UA negative for nitrates and LE.  CT head without acute pathology.  No indication for immediate antibiotics though will obtain cultures and reassess.  Exam overall very benign and nonfocal except that she is not interactive.  -IVF.  -PT/OT.  -Troponins normal.  -CXR without sign of infection  -Blood cultures.  Urine culture pending  -Low threshold for antibiotics if WBC increases in the morning or if she becomes febrile.    History of SDH  History of seizure  Continue Keppra and Depakote.  Could consider postictal state as an etiology or status epilepticus as it sounds like the patient probably has not been taking her medications consistently and certainly has not been in the last couple of days.  Her subdural hematomas have resolved and there is no evidence of these on CT from today along with no other evidence of acute pathology.  There is a moderate to severe burden of chronic small vessel ischemic change with a lot of volume loss that would suggest vascular dementia in the context of her other symptoms. Question whether presentation related to underlying seizure activity, pt with decreased responsiveness, echolalia, and mild tremor  -Neurology consulted, appreciate recs  -Depakote, valproic acid levels pending     SEVERIANO  Presumably prerenal in the setting of decreased oral intake.  Creatinine 1.34 on  admission, baseline around 0.6. Improved today to   -IVF, Daily BMP.     Hypomagnesemia  In the setting of decreased oral intake.  On the protocol.     Thrombocytopenia  No evidence of acute bleed.  Her platelet count was normal back in April so this is new.  -Peripheral smear, PTT, INR.     DM2  Last A1c 5.7 4/2022.  Not even consistent with diabetes.  In light of this, we will hold metformin while inpatient and could consider stopping this altogether on discharge. Hypoglycemic to 67 this morning, improved with juice.  -Blood sugar checks twice daily.  Can back these off if they remain normal.        Chronic, stable conditions:  HLD: Continue simvastatin.  Mental health: Continue sertraline.  Osteoporosis: hold alendronate.  History of compression fractures.  No obvious new fractures.  HTN: BP WNL.  Continue lisinopril.     Medication Reconciliation Status: Completed.          Diet: Moderate Consistent Carb (60 g CHO per Meal) Diet    DVT Prophylaxis: Pneumatic Compression Devices  Coyne Catheter: PRESENT, indication: Strict 1-2 Hour I&O  Fluids: NS 100ml/hr  Central Lines: None  Cardiac Monitoring: None  Code Status: No CPR- Do NOT Intubate      Dispo: observation status for Failure to thrive, anticipate discharge to Home versus facility.   Barriers to discharge:  work up in progress   Anticipated discharge date:  1-2 days     The patient's care was discussed with the Attending Physician, Dr. Sejal Alvarado MD.    Pancho Patel, DO  Hospitalist Service  [facility]  Securely message with the Vocera Web Console (learn more here)  Text page via readfy Paging/Directory         Clinically Significant Risk Factors Present on Admission               # Coagulation Defect: INR = 1.19 (Ref range: 0.85 - 1.15) and/or PTT = 37 Seconds (Ref range: 22 - 38 Seconds) on admission, will monitor for bleeding  # Thrombocytopenia: Plts = 69 10e3/uL (Ref range: 150 - 450 10e3/uL) on admission, will monitor for  "bleeding  # Hypertension: home medication list includes antihypertensive(s)    # Cachexia: Estimated body mass index is 15.12 kg/m  as calculated from the following:    Height as of this encounter: 1.372 m (4' 6\").    Weight as of this encounter: 28.4 kg (62 lb 11.2 oz).        ______________________________________________________________________    Interval History   No acute events overnight.     Pt in no acute distress, repetitive vocalizations noted on exam, pt does not interact with .    Called daughter Arlyn. She says that 1 week ago patient was able to walk and jose give 1 word answers to questions. She also used to follow simple instructions. 2 days ago she stopped eating and taking medications. Daughter denies any recent illnesses or indications of such. She describes her mother's last seizure as presenting as poor responsiveness, but some foaming at the mouth as well. It did appear more acute than this current presentation.    Data reviewed today: I reviewed all medications, new labs and imaging results over the last 24 hours.     Physical Exam   Vital Signs: Temp: 97.8  F (36.6  C) Temp src: Oral BP: 110/53 Pulse: 85   Resp: 20 SpO2: 99 % O2 Device: None (Room air)    Weight: 62 lbs 11.2 oz  General appearance: Cachectic, does not interact, NAD. Scrambled eggs over shoulder.  Head: Normocephalic, atraumatic.  Eyes: conjunctivae/corneas clear.  Throat: MMM.  Neck: supple, symmetrical, trachea midline.  Lungs: clear to auscultation bilaterally, no increased respiratory effort.  Heart: regular rate and rhythm, no murmurs, appropriately perfused.  Abdomen: Nondistended, soft, non-tender; no masses, no organomegaly.  Extremities: extremities atraumatic, no cyanosis, no edema, radial and DP pulses palpable bilaterally.  Skin: Skin color, texture, turgor normal. No rashes or lesions.  Neurologic: Normal tone.  Alerts to voice but does not interact.  Psychiatric: Unable to assess. Repetitive " vocalizations noted.       Data   Recent Labs   Lab 06/25/22  0841 06/25/22  0816 06/25/22  0536 06/25/22  0053 06/25/22  0019 06/24/22  1206   WBC  --   --  7.3  --  7.7 12.0*   HGB  --   --  9.8*  --  10.4* 11.7   MCV  --   --  99  --  100 98   PLT  --   --  69*  --  69* 81*   INR  --   --   --   --  1.19*  --    NA  --   --  144  --   --  141   POTASSIUM  --   --  3.8  --   --  4.0   CHLORIDE  --   --  110*  --   --  102   CO2  --   --  25  --   --  25   BUN  --   --  14  --   --  21   CR  --   --  0.96  --  0.91 1.34*   ANIONGAP  --   --  9  --   --  14   FLORES  --   --  8.5  --   --  9.8   GLC 74 67* 76   < >  --  106   ALBUMIN  --   --   --   --   --  3.6   PROTTOTAL  --   --   --   --   --  8.1*   BILITOTAL  --   --   --   --   --  0.7   ALKPHOS  --   --   --   --   --  74   ALT  --   --   --   --   --  <9   AST  --   --   --   --   --  17    < > = values in this interval not displayed.     Recent Results (from the past 24 hour(s))   Head CT w/o contrast    Narrative    EXAM: CT HEAD W/O CONTRAST  LOCATION: [facility]  DATE/TIME: 6/24/2022 1:55 PM    INDICATION: Failure to thrive decreased activity, weakness  COMPARISON: 04/25/2022 head CT. 07/26/2020 head CT and 10/19/2019 head CT.  TECHNIQUE: Routine CT Head without IV contrast. Multiplanar reformats. Dose reduction techniques were used.    FINDINGS:  INTRACRANIAL CONTENTS: No intracranial hemorrhage, extraaxial collection, or mass effect.  No CT evidence of acute infarct. Unchanged coarse calcification in the parasagittal left superior frontal gyrus. Moderate to severe burden chronic small vessel   ischemic change is unchanged. Moderate generalized volume loss. No hydrocephalus.     VISUALIZED ORBITS/SINUSES/MASTOIDS: Prior bilateral cataract surgery. Visualized portions of the orbits are otherwise unremarkable. Near-complete opacification of the right maxillary sinus with mild opacification in the posterior left ethmoid air cells.    Opacified left sphenoid sinus. No middle ear or mastoid effusion.    BONES/SOFT TISSUES: No acute abnormality.      Impression    IMPRESSION:  1.  No acute intracranial abnormality.    2.  Moderate diffuse parenchymal volume loss with a moderate to severe burden chronic small vessel ischemic change is unchanged. Coarse calcification in the parasagittal left superior frontal gyrus is unchanged.   XR Chest Port 1 View    Narrative    EXAM: XR CHEST PORT 1 VIEW  LOCATION: [facility]  DATE/TIME: 6/25/2022 12:17 AM    INDICATION: Elevated WBC, AMS.  COMPARISON: 04/25/2022      Impression    IMPRESSION: There is new minimal blunting of the left cardiophrenic angle likely base of minimal pleural fluid or thickening. Otherwise the chest is stable with again seen partially calcified thoracic aorta. Old right rib fractures. Postoperative changes   right upper quadrant most typical for cholecystectomy. No active CHF, pneumothorax, or inflammatory infiltrates identified.

## 2022-06-25 NOTE — CONSULTS
Care Management Initial Consult    General Information  Assessment completed with: Children, daughter, Arlyn  Type of CM/SW Visit: Initial Assessment    Primary Care Provider verified and updated as needed: Yes   Readmission within the last 30 days:     Reason for Consult: discharge planning  Advance Care Planning:          Communication Assessment  Patient's communication style: spoken language (non-English)    Hearing Difficulty or Deaf: other (see comments) (unknown)      Cognitive  Cognitive/Neuro/Behavioral: arousability  Level of Consciousness: lethargic  Arousal Level: unresponsive  Orientation: other (see comments) (IMTIAZ)  Mood/Behavior: withdrawn, calm     Speech: IMTIAZ (unable to assess)    Living Environment:   People in home: child(ravin), adult, grandchild(ravin)  daughter, Arlyn and her family  Current living Arrangements: house      Able to return to prior arrangements:       Family/Social Support:  Care provided by: child(ravin)  Provides care for: no one, unable/limited ability to care for self     Children          Description of Support System: Supportive, Involved    Support Assessment: Adequate family and caregiver support    Current Resources:   Patient receiving home care services: No     Community Resources: PCA  Equipment currently used at home: walker, rolling  Supplies currently used at home:      Employment/Financial:  Employment Status:          Financial Concerns: No concerns identified   Referral to Financial Worker: No     Lifestyle & Psychosocial Needs:  Social Determinants of Health     Tobacco Use: Not on file   Alcohol Use: Not on file   Financial Resource Strain: Not on file   Food Insecurity: Not on file   Transportation Needs: Not on file   Physical Activity: Not on file   Stress: Not on file   Social Connections: Not on file   Intimate Partner Violence: Not on file   Depression: Not on file   Housing Stability: Not on file     Functional Status:  Prior to admission patient needed  assistance:   Dependent ADLs:: Ambulation-walker, Bathing, Dressing, Grooming, Transfers, Toileting  Dependent IADLs:: Cleaning, Cooking, Laundry, Shopping, Meal Preparation, Medication Management, Transportation, Money Management     Mental Health Status:        Chemical Dependency Status:        Values/Beliefs:  Spiritual, Cultural Beliefs, Church Practices, Values that affect care:               Additional Information:  Family not present. Pt in process of having EEG done. Call placed to daughter, Arlyn, to review role of care management, progression of care and possible need for services at discharge, including OP services, home care, or skilled nursing care.   Arlyn states pt lives with her and Arlyn's family.  They provide all assistance for pt, including SBA for ambulation with walker, transfers, bathing, meals, medication management, grooming, etc.  Arlyn is pt's PCA for 10 hrs/day.  Discussed PT recommendation for Home PT. Arlyn states pt has declined this in the past and states pt would not want this service, so daughter declines.  She anticipates pt returning home with family and resume PCA services.  Reviewed [facility] paperwork with Arlyn.    Natali Yeh RN

## 2022-06-25 NOTE — PROGRESS NOTES
06/25/22 1300   Quick Adds   Type of Visit Initial PT Evaluation       Present yes  (family interpreted)   Language Hmong   Living Environment   People in Home child(ravin), adult;grandchild(ravin)   Current Living Arrangements house   Transportation Anticipated family or friend will provide   Self-Care   Usual Activity Tolerance fair   Current Activity Tolerance poor   Equipment Currently Used at Home walker, rolling   Activity/Exercise/Self-Care Comment patient gets assist from family for all mobility and ADL .Daughter is a PCA 10 hrs a day.Patient was able to amb short distance 10-15 feet with FWW and assist of 1 .   General Information   Onset of Illness/Injury or Date of Surgery 06/24/22   Referring Physician Cesar Saavedra   Patient/Family Therapy Goals Statement (PT) daughter plans to take pt home   Pertinent History of Current Problem (include personal factors and/or comorbidities that impact the POC) failure to thrive,weakness,AMS   Existing Precautions/Restrictions fall   Weight-Bearing Status - LLE full weight-bearing   Weight-Bearing Status - RLE full weight-bearing   Cognition   Affect/Mental Status (Cognition) confused   Cognitive Status Comments per daughter pt not fallowing instructions in Hmong ,pt speach not understandable   Pain Assessment   Patient Currently in Pain No   Range of Motion (ROM)   Range of Motion ROM deficits secondary to weakness   ROM Comment slight knee flx contractures noted   Strength (Manual Muscle Testing)   Strength (Manual Muscle Testing) Deficits observed during functional mobility   Bed Mobility   Rolling Left Skippers (Bed Mobility) moderate assist (50% patient effort);verbal cues  (assist to reach for bed rail)   Rolling Right Skippers (Bed Mobility) moderate assist (50% patient effort);verbal cues  (assist to reach for bed rail)   Supine-Sit Skippers (Bed Mobility) moderate assist (50% patient effort)   Bed Mobility Limitations  cognitive deficits;decreased ability to use arms for pushing/pulling;decreased ability to use legs for bridging/pushing;impaired ability to control trunk for mobility   Comment, (Bed Mobility) pt needed assist with upper and lower body   Bed-Chair Transfer   Bed-Chair Cornish (Transfers) moderate assist (50% patient effort)   Assistive Device (Bed-Chair Transfers) other (see comments)   Comment, (Bed-Chair Transfer) therapist in fron,pt reaching for arm rests of the chair   Clinical Impression   Criteria for Skilled Therapeutic Intervention Yes, treatment indicated   PT Diagnosis (PT) impaired functional mobility   Influenced by the following impairments weakness,cognition   Functional limitations due to impairments bed mobility ,transfers   Clinical Presentation (PT Evaluation Complexity) Stable/Uncomplicated   Clinical Presentation Rationale pt presents as med diagnosed   Clinical Decision Making (Complexity) low complexity   Planned Therapy Interventions (PT) bed mobility training;patient/family education;neuromuscular re-education;ROM (range of motion);strengthening;transfer training   Anticipated Equipment Needs at Discharge (PT) wheelchair;other (see comments)  (hospital bed)   Risk & Benefits of therapy have been explained evaluation/treatment results reviewed;daughter   Clinical Impression Comments Patient is 81 yo female admitted with failure to thrive,weakness,AMS.Patient has a dementia at baseline.Needed assist with all mobility and ADL prior .Was able to amb short distances with FWW and assist of 1.Appears below her functional baseline.   PT Discharge Planning   PT Discharge Recommendation (DC Rec) home with assist;home with home care physical therapy   PT Rationale for DC Rec daughter PCA 10 hrs a day.May try home PT for gait training.Will neeed 24 hrs assist  from family   Total Evaluation Time   Total Evaluation Time (Minutes) 15   Physical Therapy Goals   PT Frequency 5x/week   PT Predicted  Duration/Target Date for Goal Attainment 06/29/22   PT Goals Bed Mobility;Transfers   PT: Bed Mobility Minimal assist;Supine to/from sit   PT: Transfers Minimal assist;Bed to/from chair;Assistive device

## 2022-06-25 NOTE — PLAN OF CARE
"PRIMARY DIAGNOSIS: \"GENERIC\" NURSING  OUTPATIENT/OBSERVATION GOALS TO BE MET BEFORE DISCHARGE:  ADLs back to baseline: No    Activity and level of assistance: Up with maximum assistance. Consider SW and/or PT evaluation.     Pain status: Pain free.    Return to near baseline physical activity: No     Discharge Planner Nurse   Safe discharge environment identified: No  Barriers to discharge: Yes       Entered by: Mony Ledesma RN 06/25/2022 4:25 AM     Please review provider order for any additional goals.   Nurse to notify provider when observation goals have been met and patient is ready for discharge.Goal Outcome Evaluation:                      "

## 2022-06-25 NOTE — PROGRESS NOTES
[facility]      OUTPATIENT PHYSICAL THERAPY EVALUATION  PLAN OF TREATMENT FOR OUTPATIENT REHABILITATION  (COMPLETE FOR INITIAL CLAIMS ONLY)  Patient's Last Name, First Name, M.I.  YOB: 1940  Ivan Hook                           Provider's Name  [facility] Medical Record No.  [id number]                               Onset Date:  06/24/22   Start of Care Date:         Type:     _X_PT   ___OT   ___SLP Medical Diagnosis:                           PT Diagnosis:  impaired functional mobility   Visits from SOC:  1   _________________________________________________________________________________  Plan of Treatment/Functional Goals    Planned Interventions: bed mobility training, patient/family education, neuromuscular re-education, ROM (range of motion), strengthening, transfer training     Goals: See Physical Therapy Goals on Care Plan in [employer] electronic health record.    Therapy Frequency: 5x/week  Predicted Duration of Therapy Intervention: 06/29/22  _________________________________________________________________________________    I CERTIFY THE NEED FOR THESE SERVICES FURNISHED UNDER        THIS PLAN OF TREATMENT AND WHILE UNDER MY CARE     (Physician co-signature of this document indicates review and certification of the therapy plan).               , Certification date to: (P) 07/01/22    Referring Physician: Cesar Saavedra            Initial Assessment        See Physical Therapy evaluation dated   in Epic electronic health record.

## 2022-06-25 NOTE — ED NOTES
"[facility] ED Handoff Report    ED Chief Complaint: Failure to Thrive     ED Diagnosis:  (R62.7) Failure to thrive in adult  Comment:   Plan:        PMH:  History reviewed. No pertinent past medical history.     Code Status:  No Order     Falls Risk: Yes Band: Not applicable    Current Living Situation/Residence: lives with their son or daughter     Elimination Status: Continent: No and indwelling catheter     Activity Level: Total assist/lift    Patients Preferred Language:  Other: Hmong     Needed: Yes- does not communicate     Vital Signs:  /77   Pulse 82   Temp 98.2  F (36.8  C) (Tympanic)   Resp 16   SpO2 97%      Cardiac Rhythm: NSR- on EKG    Pain Score: Patient is unable to quantify.    Is the Patient Confused:  Yes    Last Food or Drink: 06/21/22     Focused Assessment:  Patient was brought my EMS as family reported that they have not been eating or drinking for a couple days. Hx of Dementia with a change in lot communicating in the past couple days also. No family arrived with patient, but then, after much work, writer got ahold of daughter, who came to speak with RN and MD. Daughter didn't seem to know much about the patient stating, \" she takes medication, I dont know what they are and I didn't bring a list\". Daughter has left for the night, but number is in the chart.   Patient does not communicate. Reportably incontinent, but was retaining urine so we placed a garcia. Was eating purreed foods at home before the past couple days. Is not safe to eat now.     Tests Performed: Done: Labs and Imaging    Treatments Provided:  Garcia, replaced MG    Family Dynamics/Concerns: Yes; please explain: as stated above     Family Updated On Visitor Policy: Yes    Plan of Care Communicated to Family: Yes    Who Was Updated about Plan of Care: daughter was here     Belongings Checklist Done and Signed by Patient: No    Medications sent with patient: none    Covid: asymptomatic , " neg    Additional Information: none    RN: Lonnie Guy RN   6/24/2022 7:04 PM

## 2022-06-25 NOTE — PROGRESS NOTES
Bedside EEG was performed that included photic stimulation. HV is not done due to patient state.The patient was both awake and asleep during the recording.  EEG #345   EEG system was used.[email or web address]

## 2022-06-25 NOTE — PLAN OF CARE
"PRIMARY DIAGNOSIS: \"GENERIC\" NURSING  OUTPATIENT/OBSERVATION GOALS TO BE MET BEFORE DISCHARGE:  ADLs back to baseline: No    Activity and level of assistance: . unresponsive    Pain status: Pain free.    Return to near baseline physical activity: No     Discharge Planner Nurse   Safe discharge environment identified: No  Barriers to discharge: Yes       Entered by: Mony Ledesma RN 06/25/2022 1:14 AM     Please review provider order for any additional goals.   Nurse to notify provider when observation goals have been met and patient is ready for discharge.Goal Outcome Evaluation:                      "

## 2022-06-25 NOTE — PLAN OF CARE
Goal Outcome Evaluation:  Patient lethargic, no verbal response, total care.  Dementia pain scale use-scoring 0 with no signs;/symptoms of pain.  Mg replacement finished upon arrival to the floor.  Coyne catheter in place with yellow urine.  VSS, sating 94-95% on RA.  IVF hydration.  Oral cares and repositioning provided.

## 2022-06-25 NOTE — PLAN OF CARE
Problem: Plan of Care - These are the overarching goals to be used throughout the patient stay.    Goal: Plan of Care Review/Shift Note  Description: The Plan of Care Review/Shift note should be completed every shift.  The Outcome Evaluation is a brief statement about your assessment that the patient is improving, declining, or no change.  This information will be displayed automatically on your shift note.  Outcome: Ongoing, Progressing   Goal Outcome Evaluation:  Pt taking in nutrition with staff feeding today.  Still uncommunicative.  Making noise but no words.  Up in chair with therapy.  Then back to bed for EEG with assist of 2 with gait belt and walker.  Ordered mashed potatoes with chicken gravy and orange juice as daughter said she likes these foods.

## 2022-06-25 NOTE — CONSULTS
NEUROLOGY CONSULTATION NOTE     Ivan Hook,  1940, MRN [id number] Date: 2022     [facility]   Code status:  No CPR- Do NOT Intubate   PCP: Jl Kohler, [phone]      ASSESSMENT & PLAN   Diagnosis code: Encephalopathy/decreased responsiveness likely multifactorial.  Failure to thrive.  History of seizures and subdural hematoma.    82-year-old woman with history of diabetes, hypertension, hyperlipidemia and subdural hematoma as well as seizures here for failure to thrive and decreased responsiveness.    Head CT shows nothing acute.  Depakote level came back subtherapeutic at 18.  Recommend increasing the dose to 500mg twice daily.  Monitor platelets (stable at 69 today).  Continue the 250mg twice daily of Keppra for now, level pending.    Elevated WBCs.  UA positive for ketones.  UC pending.  Blood cultures pending.  Infection management per primary team.  Low magnesium.  Repletion per primary team.  Some volitional movements on exam, so status epilepticus is unlikely.  We will go ahead and check a routine EEG.  Neurology will follow along for EEG results/exam changes.       Chief Complaint   Patient presents with     Failure to Thrive        HISTORY OF PRESENT ILLNESS     We have been requested by Dr. Alvarado to evaluate Ivan Hook who is a 82 year old female for failure to thrive/decreased responsiveness.  Per chart review, the patient was admitted on 22 for decreased responsiveness and failure to thrive.  She has relevant history of subdural hematoma (traumatic) and seizures.  She is on Keppra and Depakote PTA.  I do not see any neurology notes in her chart.    Daughter reported that the patient had not been acting like her usual self for the past couple of days, not eating.  At baseline she is not very interactive nor can she really carry on a meaningful conversation per family.  No seizure activity noted by family.    Show history of diabetes with neuropathy, hypertension, hyperlipidemia.      PAST MEDICAL & SURGICAL HISTORY     Medical History  History reviewed. No pertinent past medical history.  Surgical History  History reviewed. No pertinent surgical history.     SOCIAL HISTORY     Social History      FAMILY HISTORY     Reviewed, and family history is not on file.     ALLERGIES     No Known Allergies     REVIEW OF SYSTEMS     Review of systems not obtained due to patient factors.     HOME & HOSPITAL MEDICATIONS     Prior to Admission Medications  Medications Prior to Admission   Medication Sig Dispense Refill Last Dose     alendronate (FOSAMAX) 70 MG tablet Take 70 mg by mouth every 7 days   Past Week at Unknown time     divalproex sodium delayed-release (DEPAKOTE) 250 MG DR tablet Take 250 mg by mouth 2 times daily   Past Week at Unknown time     levETIRAcetam (KEPPRA) 250 MG tablet Take 250 mg by mouth 2 times daily   Past Week at Unknown time     lisinopril (ZESTRIL) 2.5 MG tablet Take 2.5 mg by mouth daily   Past Week at Unknown time     metFORMIN (GLUCOPHAGE) 850 MG tablet Take 850 mg by mouth 2 times daily (with meals)   Past Week at Unknown time     sertraline (ZOLOFT) 50 MG tablet Take 50 mg by mouth daily   Past Week at Unknown time     simvastatin (ZOCOR) 10 MG tablet Take 10 mg by mouth At Bedtime   Past Week at Unknown time       Hospital Medications    divalproex sodium delayed-release  250 mg Oral BID     levETIRAcetam  250 mg Oral BID     lisinopril  2.5 mg Oral Daily     polyethylene glycol  17 g Oral Daily     sertraline  50 mg Oral Daily     simvastatin  10 mg Oral At Bedtime     sodium chloride (PF)  3 mL Intracatheter Q8H        PHYSICAL EXAM     Vital signs  Temp:  [97.8  F (36.6  C)-98.7  F (37.1  C)] 97.8  F (36.6  C)  Pulse:  [71-99] 85  Resp:  [16-20] 20  BP: (110-143)/(53-72) 110/53  SpO2:  [95 %-99 %] 99 %    Weight:   [unfilled]    General Physical Exam: Patient is alert and oriented x 0. Vital signs were reviewed and are documented in EMR. Neck was supple.  No  carotid bruit, thyromegaly, JVD or lymphadenopathy noted.  Neurological Exam:  Mental status: Somnolent, minimally responsive but does resist some of my testing.  Speech: Groaning only.  Cranial nerves: Face appears symmetric.  Patient does not interact or other testing.  Left eye with bluish discoloration (?cataract), right pupil is responsive.  Motor: Moves all extremities spontaneously and withdraws from stimulation.  Sensory: Cannot reliably test.  Coordination: Cannot reliably test.  Gait: Deferred for safety.     DIAGNOSTIC STUDIES     Pertinent Radiology   Radiology Results: Personally reviewed image/s    CT Head:  IMPRESSION:  1.  No acute intracranial abnormality.     2.  Moderate diffuse parenchymal volume loss with a moderate to severe burden chronic small vessel ischemic change is unchanged. Coarse calcification in the parasagittal left superior frontal gyrus is unchanged.    Pertinent Labs   Lab Results: personally reviewed.   Recent Results (from the past 24 hour(s))   Asymptomatic COVID-19 Virus (Coronavirus) by PCR Nasopharyngeal    Collection Time: 06/24/22  3:44 PM    Specimen: Nasopharyngeal; Swab   Result Value Ref Range    SARS CoV2 PCR Negative Negative   UA with Microscopic reflex to Culture    Collection Time: 06/24/22  7:03 PM    Specimen: Urine, Catheter   Result Value Ref Range    Color Urine Light Yellow Colorless, Straw, Light Yellow, Yellow    Appearance Urine Clear Clear    Glucose Urine Negative Negative mg/dL    Bilirubin Urine Negative Negative    Ketones Urine 40  (A) Negative mg/dL    Specific Gravity Urine 1.018 1.001 - 1.030    Blood Urine 0.1 mg/dL (A) Negative    pH Urine 6.5 5.0 - 7.0    Protein Albumin Urine 30  (A) Negative mg/dL    Urobilinogen Urine <2.0 <2.0 mg/dL    Nitrite Urine Negative Negative    Leukocyte Esterase Urine Negative Negative    RBC Urine 8 (H) <=2 /HPF    WBC Urine 1 <=5 /HPF    Hyaline Casts Urine 1 <=2 /LPF   Troponin I    Collection Time: 06/24/22  11:43 PM   Result Value Ref Range    Troponin I <0.01 0.00 - 0.29 ng/mL   Partial thromboplastin time    Collection Time: 06/25/22 12:19 AM   Result Value Ref Range    aPTT 37 22 - 38 Seconds   INR    Collection Time: 06/25/22 12:19 AM   Result Value Ref Range    INR 1.19 (H) 0.85 - 1.15   Creatinine    Collection Time: 06/25/22 12:19 AM   Result Value Ref Range    Creatinine 0.91 0.60 - 1.10 mg/dL    GFR Estimate 63 >60 mL/min/1.73m2   Magnesium    Collection Time: 06/25/22 12:19 AM   Result Value Ref Range    Magnesium 2.1 1.8 - 2.6 mg/dL   CBC with platelets and differential    Collection Time: 06/25/22 12:19 AM   Result Value Ref Range    WBC Count 7.7 4.0 - 11.0 10e3/uL    RBC Count 3.23 (L) 3.80 - 5.20 10e6/uL    Hemoglobin 10.4 (L) 11.7 - 15.7 g/dL    Hematocrit 32.2 (L) 35.0 - 47.0 %     78 - 100 fL    MCH 32.2 26.5 - 33.0 pg    MCHC 32.3 31.5 - 36.5 g/dL    RDW 13.1 10.0 - 15.0 %    Platelet Count 69 (L) 150 - 450 10e3/uL    % Neutrophils 61 %    % Lymphocytes 26 %    % Monocytes 12 %    % Eosinophils 0 %    % Basophils 0 %    % Immature Granulocytes 1 %    NRBCs per 100 WBC 0 <1 /100    Absolute Neutrophils 4.8 1.6 - 8.3 10e3/uL    Absolute Lymphocytes 2.0 0.8 - 5.3 10e3/uL    Absolute Monocytes 0.9 0.0 - 1.3 10e3/uL    Absolute Eosinophils 0.0 0.0 - 0.7 10e3/uL    Absolute Basophils 0.0 0.0 - 0.2 10e3/uL    Absolute Immature Granulocytes 0.1 <=0.4 10e3/uL    Absolute NRBCs 0.0 10e3/uL   Reticulocyte count    Collection Time: 06/25/22 12:19 AM   Result Value Ref Range    % Reticulocyte 1.1 0.8 - 2.7 %    Absolute Reticulocyte 0.035 0.010 - 0.110 10e6/uL   Extra Red Top Tube    Collection Time: 06/25/22 12:19 AM   Result Value Ref Range    Hold Specimen JIC    Extra Purple Top Tube    Collection Time: 06/25/22 12:19 AM   Result Value Ref Range    Hold Specimen JIC    Valproic acid    Collection Time: 06/25/22 12:19 AM   Result Value Ref Range    Valproic acid 18.1   ug/mL   Glucose by meter     Collection Time: 06/25/22 12:53 AM   Result Value Ref Range    GLUCOSE BY METER POCT 94 70 - 99 mg/dL   Troponin I    Collection Time: 06/25/22  5:36 AM   Result Value Ref Range    Troponin I <0.01 0.00 - 0.29 ng/mL   CBC with platelets    Collection Time: 06/25/22  5:36 AM   Result Value Ref Range    WBC Count 7.3 4.0 - 11.0 10e3/uL    RBC Count 3.11 (L) 3.80 - 5.20 10e6/uL    Hemoglobin 9.8 (L) 11.7 - 15.7 g/dL    Hematocrit 30.8 (L) 35.0 - 47.0 %    MCV 99 78 - 100 fL    MCH 31.5 26.5 - 33.0 pg    MCHC 31.8 31.5 - 36.5 g/dL    RDW 13.1 10.0 - 15.0 %    Platelet Count 69 (L) 150 - 450 10e3/uL   Basic metabolic panel    Collection Time: 06/25/22  5:36 AM   Result Value Ref Range    Sodium 144 136 - 145 mmol/L    Potassium 3.8 3.5 - 5.0 mmol/L    Chloride 110 (H) 98 - 107 mmol/L    Carbon Dioxide (CO2) 25 22 - 31 mmol/L    Anion Gap 9 5 - 18 mmol/L    Urea Nitrogen 14 8 - 28 mg/dL    Creatinine 0.96 0.60 - 1.10 mg/dL    Calcium 8.5 8.5 - 10.5 mg/dL    Glucose 76 70 - 125 mg/dL    GFR Estimate 59 (L) >60 mL/min/1.73m2   Glucose by meter    Collection Time: 06/25/22  8:16 AM   Result Value Ref Range    GLUCOSE BY METER POCT 67 (L) 70 - 99 mg/dL   Glucose by meter    Collection Time: 06/25/22  8:41 AM   Result Value Ref Range    GLUCOSE BY METER POCT 74 70 - 99 mg/dL   Glucose by meter    Collection Time: 06/25/22 11:35 AM   Result Value Ref Range    GLUCOSE BY METER POCT 163 (H) 70 - 99 mg/dL       Total time spent for face to face visit, reviewing labs/imaging studies, counseling and coordination of care was: 1 Hour. More than 50% of this time was spent on counseling and coordination of care.    Dragon software used in the dictation of this note.    Lorraine Magallon MD  [facility] - [address], [address]  [place] [address]  [phone]

## 2022-06-25 NOTE — PROGRESS NOTES
Neurosurgery Note:    Consult received for patient with previous hx of seizures, subdural hematomas. There are no subdural hematomas on most recent CT imaging. If concerned for persistent seizure, recommend neurology consultation. No formal neurosurgery consult is needed. Please call with questions.     Shauna Stephenson CNP  [facility]  O: [phone]  P: [phone]

## 2022-06-26 NOTE — PLAN OF CARE
"PRIMARY DIAGNOSIS: \"GENERIC\" NURSING  OUTPATIENT/OBSERVATION GOALS TO BE MET BEFORE DISCHARGE:  ADLs back to baseline: No    Activity and level of assistance: Up with maximum assistance. Consider SW and/or PT evaluation.     Pain status: Pain free.    Return to near baseline physical activity: No     Discharge Planner Nurse   Safe discharge environment identified: No  Barriers to discharge: Yes       Entered by: Harsh Lainez RN 06/26/2022 11:49 AM     Please review provider order for any additional goals.   Nurse to notify provider when observation goals have been met and patient is ready for discharge.Goal Outcome Evaluation:                      "

## 2022-06-26 NOTE — PROGRESS NOTES
[facility]    Progress Note - Hospitalist Service       Date of Admission:  6/24/2022    Assessment & Plan          Ivan Hook is a 82 year old female admitted on 6/24/2022. She has history of diabetes, hypertension, hyperlipidemia, peripheral neuropathy, previous traumatic subdural hematoma, and seizure and presents for evaluation of decreased responsiveness and decreased oral intake.     Failure to thrive  Decreased oral intake  Decreased responsiveness, acute encephalopathy due to seizures  History of SDH  History of seizure  EEG showing some seizure like activity, antiepileptics subtherapeutic--PTA keppra and depakote. Her subdural hematomas have resolved and there is no evidence of these on CT from admission. There is a moderate to severe burden of chronic small vessel ischemic change with a lot of volume loss that would suggest vascular dementia in the context of her other symptoms.   Per family, patient has not been able to take medications.  No signs of infection.  COVID-19 negative.  UA negative for nitrates and LE.  CT head without acute pathology.  No indication for immediate antibiotics though will obtain cultures and reassess.  Exam overall very benign and nonfocal except that she is not interactive.  -Neurology consulted, appreciate recs  -IVF.  -PT/OT.  -Blood cultures, NGTD.  Urine culture pending  -Resuming depakote and keppra per neurology    Dysphagia  Patient with significant difficulty swallowing pills. Transitioning to crushable options. Will need video swallow study--will wait until able to fully participate in cares/follow commands.     SEVERIANO, resolved  Presumably prerenal in the setting of decreased oral intake.  Creatinine 1.34 on admission, baseline around 0.6.  -Daily BMP.     Hypomagnesemia  In the setting of decreased oral intake.  On replacement protocol.     Thrombocytopenia, improving  No evidence of acute bleed.  Her platelet count was normal back in April  so this is new. INR is elevated to 1.19, PTT wnl.  -Peripheral smear pending  -CBC daily.    DM2  Last A1c 5.7 4/2022.  Not even consistent with diabetes.  In light of this, we will hold metformin while inpatient and could consider stopping this altogether on discharge. Hypoglycemic to 67 this morning, improved with juice.  -Blood sugar checks twice daily.  Can back these off if they remain normal.        Chronic, stable conditions:  HLD: Continue simvastatin.  Mental health: Continue sertraline.  Osteoporosis: hold alendronate.  History of compression fractures.  No obvious new fractures.  HTN: BP WNL.  Continue lisinopril.     Medication Reconciliation Status: Completed.       Diet: Moderate Consistent Carb (60 g CHO per Meal) Diet    DVT Prophylaxis: Pneumatic Compression Devices  [place]ne Catheter: PRESENT, indication: Strict 1-2 Hour I&O  Fluids: NS 75 ml/hr; consider discontinuing tomorrow if PO intake increases  Central Lines: None  Cardiac Monitoring: None  Code Status: No CPR- Do NOT Intubate      Dispo: observation status for Failure to thrive, anticipate discharge to Home versus facility.   Barriers to discharge:  work up in progress   Anticipated discharge date:  1-2 days     The patient's care was discussed with the Attending Physician, Dr. Sejal Alvarado MD.    Rosie Li MD  Hospitalist Service  [facility]      Clinically Significant Risk Factors Present on Admission           # Hypomagnesemia: Mg = 1.3 mg/dL (Ref range: 1.8 - 2.6 mg/dL) on admission, will replace as needed     # Coagulation Defect: INR = 1.19 (Ref range: 0.85 - 1.15) and/or PTT = 37 Seconds (Ref range: 22 - 38 Seconds) on admission, will monitor for bleeding  # Thrombocytopenia: Plts = 69 10e3/uL (Ref range: 150 - 450 10e3/uL) on admission, will monitor for bleeding  # Hypertension: home medication list includes antihypertensive(s)    # Cachexia: Estimated body mass index is 15.12 kg/m  as calculated from  "the following:    Height as of this encounter: 1.372 m (4' 6\").    Weight as of this encounter: 28.4 kg (62 lb 11.2 oz).        ______________________________________________________________________    Interval History      No acute events overnight.     Pt in no acute distress, pt does not interact with , alerting more to voice today, participating in cares.    EEG findings of possible focal seizure like activity, antiepileptics subtherapeutic.     Data reviewed today: I reviewed all medications, new labs and imaging results over the last 24 hours.     Physical Exam   Vital Signs: Temp: 98  F (36.7  C) Temp src: Oral BP: 109/61 Pulse: 88   Resp: 16 SpO2: 97 % O2 Device: None (Room air)    Weight: 62 lbs 11.2 oz     General appearance: Cachectic, alerts to voice, not conversant, NAD. Not following simple commands   Head: Normocephalic, atraumatic.  Eyes: conjunctivae/corneas clear.  Throat: MMM.  Neck: supple, symmetrical, trachea midline.  Lungs: clear to auscultation bilaterally, no increased respiratory effort.  Heart: regular rate and rhythm, no murmurs, appropriately perfused.  Abdomen: Nondistended, soft, non-tender; no masses, no organomegaly.  Extremities: extremities atraumatic, no cyanosis, no edema, radial and DP pulses palpable bilaterally.  Skin: Skin color, texture, turgor normal. No rashes or lesions.  Neurologic: Normal tone.  Alerts to voice but does not interact.  Psychiatric: Unable to assess. Repetitive vocalizations noted.       Data   Recent Labs   Lab 06/26/22  0813 06/25/22  2120 06/25/22  1728 06/25/22  0816 06/25/22  0536 06/25/22  0053 06/25/22  0019 06/24/22  1206   WBC  --   --   --   --  7.3  --  7.7 12.0*   HGB  --   --   --   --  9.8*  --  10.4* 11.7   MCV  --   --   --   --  99  --  100 98   PLT  --   --   --   --  69*  --  69* 81*   INR  --   --   --   --   --   --  1.19*  --    NA  --   --   --   --  144  --   --  141   POTASSIUM  --   --   --   --  3.8  --   --  4.0 "   CHLORIDE  --   --   --   --  110*  --   --  102   CO2  --   --   --   --  25  --   --  25   BUN  --   --   --   --  14  --   --  21   CR  --   --   --   --  0.96  --  0.91 1.34*   ANIONGAP  --   --   --   --  9  --   --  14   FLORES  --   --   --   --  8.5  --   --  9.8   GLC 88 179* 133*   < > 76   < >  --  106   ALBUMIN  --   --   --   --   --   --   --  3.6   PROTTOTAL  --   --   --   --   --   --   --  8.1*   BILITOTAL  --   --   --   --   --   --   --  0.7   ALKPHOS  --   --   --   --   --   --   --  74   ALT  --   --   --   --   --   --   --  <9   AST  --   --   --   --   --   --   --  17    < > = values in this interval not displayed.     No results found for this or any previous visit (from the past 24 hour(s)).

## 2022-06-26 NOTE — PLAN OF CARE
"PRIMARY DIAGNOSIS: \"GENERIC\" NURSING  OUTPATIENT/OBSERVATION GOALS TO BE MET BEFORE DISCHARGE:  ADLs back to baseline: No    Activity and level of assistance: Up with maximum assistance. Consider SW and/or PT evaluation.     Pain status: Pain free.    Return to near baseline physical activity: No     Discharge Planner Nurse   Safe discharge environment identified: No  Barriers to discharge: Yes       Entered by: Mony Ledesma RN 06/26/2022 6:31 AM     Please review provider order for any additional goals.   Nurse to notify provider when observation goals have been met and patient is ready for discharge.Goal Outcome Evaluation:                      "

## 2022-06-26 NOTE — PLAN OF CARE
Goal Outcome Evaluation:  Patient with dementia. More alert tonight than last night. Eyes open spontaneously and to sound. Unable to communicate needs and not sure how much she understands. Attempted to use  but  unable to understand patient. It seems she is just making repetitive sounds without meaning.  Patient required assistance with eating and ate pretty well. She will occasionally pocket food so needs reminders to swallow and go slowly. Took her meds tonight crushed in vanilla pudding. She spit pill out when attempted to give them whole.  Has garcia catheter with clear/pale yellow urine output.  IVF hydration.  Reposition side to side. Pillow support provided.

## 2022-06-26 NOTE — PLAN OF CARE
Goal Outcome Evaluation:        Problem: Pain Acute  Goal: Acceptable Pain Control and Functional Ability  Outcome: Ongoing, Progressing     Problem: Plan of Care - These are the overarching goals to be used throughout the patient stay.    Goal: Optimal Comfort and Wellbeing  Outcome: Ongoing, Progressing   Pt is A&O x0, IMTIAZ orientation. Pt talks but incoherent. Pt did not appear to be in pain. Pt ate about 50% each of breakfast and lunch.

## 2022-06-26 NOTE — PLAN OF CARE
"PRIMARY DIAGNOSIS: \"GENERIC\" NURSING  OUTPATIENT/OBSERVATION GOALS TO BE MET BEFORE DISCHARGE:  1. ADLs back to baseline: no    2. Activity and level of assistance: Up with maximum assistance. Consider SW and/or PT evaluation.     3. Pain status: Pain free.    4. Return to near baseline physical activity: No     Discharge Planner Nurse   Safe discharge environment identified: No  Barriers to discharge: Yes       Entered by: Mony Ledesma RN 06/26/2022 1:40 AM     Please review provider order for any additional goals.   Nurse to notify provider when observation goals have been met and patient is ready for discharge.Goal Outcome Evaluation:                      "

## 2022-06-26 NOTE — UTILIZATION REVIEW
Admission Status; Secondary Review Determination   Under the authority of the Utilization Management Committee, the utilization review process indicated a secondary review on Ivan Hook. The review outcome is based on review of the medical records, discussions with staff, and applying clinical experience noted on the date of the review.   (x) Inpatient Status Appropriate - This patient's medical care is consistent with medical management for inpatient care and reasonable inpatient medical practice.     RATIONALE FOR DETERMINATION   82 yr old female with known DM, HTN, HLD, seizure who presented 6/24/22 with episode of decreased responsiveness.  Found to have EEG with seizure like activity and anti-epileptic meds (both) are subtherapeutic.  While in hospital it has become apparent she cannot swallow her medications and thus working on other options to dose.  Need to verify she can tolerate these changes and will continue to take.  Neurology following and will trend levels and attending working on assessing the dysphagia and poor oral intake and issues with medications.  Starting on IVF and trending as well given poor PO today.  Crossing 3rd night.    At the time of admission with the information available to the attending physician more than 2 nights Hospital complex care was anticipated, based on patient risk of adverse outcome if treated as outpatient and complex care required. Inpatient admission is appropriate based on the Medicare guidelines.   The information on this document is developed by the utilization review team in order for the business office to ensure compliance. This only denotes the appropriateness of proper admission status and does not reflect the quality of care rendered.   The definitions of Inpatient Status and Observation Status used in making the determination above are those provided in the CMS Coverage Manual, Chapter 1 and Chapter 6, section 70.4.   Sincerely,   Leilani Arriaga,  MD  Utilization Review  Physician Advisor  [facility]

## 2022-06-26 NOTE — SIGNIFICANT EVENT
Pt more aroused this am .   called .No other significant past medical history or family history. One available at this time. Pt repositioned.  Appears comfortable. Some grunting sounds. Opened eyes.  Closed mouth movements. Some verbal talking. Repositioned.  IV fluids infusing.

## 2022-06-26 NOTE — PROGRESS NOTES
NEUROLOGY PROGRESS NOTE     ASSESSMENT & PLAN     Diagnosis code: Encephalopathy/decreased responsiveness likely multifactorial. Failure to thrive.  History of seizures and subdural hematoma.     82-year-old woman with history of diabetes, hypertension, hyperlipidemia and subdural hematoma as well as seizures here for failure to thrive and decreased responsiveness.     Head CT shows nothing acute.  Depakote level came back subtherapeutic at 18.  Recommend increasing the dose to 500mg twice daily.  Monitor platelets (91 today).  Keppra level also came back subtherapeutic (<2. Raises the question of whether the patient is taking her medications at home).  Recommend increasing the dose of this medication to 500mg twice daily.  EEG shows generalized sharp activity predominantly in the left hemisphere and low threshold for focal seizures.  We will recheck AED levels in a couple of days if patient remains in-house.  Elevated WBCs.  UA positive for ketones.  UC pending.  Blood cultures pending.  Infection management per primary team.  Low magnesium.  Repletion per primary team, resolved.  Glucose management per primary team.  Recommend PT/OT.  Neurology will follow along.  Exam is improving.  Spoke to daughter about the test results and plan at bedside.     Neurology Discharge Planning:  TBD    Patient Active Problem List    Diagnosis Date Noted     Failure to thrive in adult 06/24/2022     Priority: Medium     Medical History  History reviewed. No pertinent past medical history.     SUBJECTIVE     Per nursing notes, the patient was more alert, arousable this morning.  Opened her eyes spontaneously and appeared comfortable, some talking.    Ivan's daughter is at bedside.  She says that her mother is talking but is not really making sense.  She confirms that Ivan deteriorated over the past few days prior to admission.  Prior to this she was walking, getting around okay.  I ask about and daughter says Ivan does not see out of  this eye.     OBJECTIVE     Vital signs in last 24 hours  Temp:  [97.7  F (36.5  C)-98  F (36.7  C)] 98  F (36.7  C)  Pulse:  [72-89] 88  Resp:  [16-20] 16  BP: (109-140)/(53-76) 109/61  SpO2:  [97 %-99 %] 97 %    Weight:   [unfilled]    Review of Systems   Review of systems not obtained due to patient factors.    General Physical Exam: Patient is alert and oriented x 0 Vital signs were reviewed and are documented in EMR.  Redness around/involving left eyelid.  No apparent infection.  Neurological Exam:  Mental status: Alert, occasionally attentive  Speech: Gibberish per daughter  Cranial nerves: Face appears symmetric.  Right pupil is reactive, cannot test on the left.  Motor: Moves all extremities.  Does not really follow instructions with  strength testing.  Sensory: Appears to be intact to light touch but cannot reliably test.       DIAGNOSTIC STUDIES     Pertinent Radiology   Radiology Results: Personally reviewed image/s    Electroencephalogram (6.25.22):  Impression: This is an abnormal EEG due to a diffusely slow background in theta and delta range that is maximally expressed in the left hemisphere associated with occasional generalized wide-complex sharp activity again noted to be more pronounced in the left hemisphere suggesting diffuse cerebral dysfunction with predominantly left hemispheric pathology and a low threshold for focal seizures.     Classification: Dysrhythmia grade III generalized, maximum left                            Dysrhythmia grade II generalized maximum left                            Delta grade I generalized    Pertinent Labs   Lab Results: personally reviewed.   Recent Results (from the past 24 hour(s))   Glucose by meter    Collection Time: 06/25/22 11:35 AM   Result Value Ref Range    GLUCOSE BY METER POCT 163 (H) 70 - 99 mg/dL   Glucose by meter    Collection Time: 06/25/22  5:28 PM   Result Value Ref Range    GLUCOSE BY METER POCT 133 (H) 70 - 99 mg/dL   Glucose by meter     Collection Time: 06/25/22  9:20 PM   Result Value Ref Range    GLUCOSE BY METER POCT 179 (H) 70 - 99 mg/dL   Magnesium    Collection Time: 06/26/22  5:29 AM   Result Value Ref Range    Magnesium 1.3 (L) 1.8 - 2.6 mg/dL   Extra Purple Top Tube    Collection Time: 06/26/22  5:29 AM   Result Value Ref Range    Hold Specimen JIC    Glucose by meter    Collection Time: 06/26/22  8:13 AM   Result Value Ref Range    GLUCOSE BY METER POCT 88 70 - 99 mg/dL         HOSPITAL MEDICATIONS       divalproex sodium delayed-release  500 mg Oral BID     levETIRAcetam  250 mg Oral BID     lisinopril  2.5 mg Oral Daily     polyethylene glycol  17 g Oral Daily     sertraline  50 mg Oral Daily     simvastatin  10 mg Oral At Bedtime     sodium chloride (PF)  3 mL Intracatheter Q8H        Total time spent for face to face visit, reviewing labs/imaging studies, counseling and coordination of care was: 45 Minutes. More than 50% of this time was spent on counseling and coordination of care.    Dragon software used in the dictation on this note.    Lorraine Magallon MD  [facility] - [address], [address]  [place] [address]

## 2022-06-27 NOTE — PROGRESS NOTES
NEUROLOGY PROGRESS NOTE     ASSESSMENT & PLAN     Diagnosis code: Encephalopathy/decreased responsiveness likely multifactorial. Failure to thrive.  History of seizures and subdural hematoma.     82-year-old woman with history of diabetes, hypertension, hyperlipidemia and subdural hematoma as well as seizures here for failure to thrive and decreased responsiveness.     Head CT shows nothing acute.  Depakote level came back subtherapeutic at 18.  Recommend increasing the dose to 500mg twice daily.  Monitor platelets (91 today).  Keppra level also came back subtherapeutic (<2. Raises the question of whether the patient is taking her medications at home).  Recommend increasing the dose of this medication to 500mg twice daily.  EEG shows generalized sharp activity predominantly in the left hemisphere and low threshold for focal seizures.  We will recheck AED levels tomorrow if patient remains in-house.  Elevated WBCs.  UA positive for ketones.  Prelim UC and blood cultures negative.  Infection management per primary team.  Low magnesium.  Repletion per primary team, fluctuating.  Glucose management per primary team.  RBC count is trending down.  Management per primary team.  Morphology pending.  Recommend PT/OT.  Neurology will follow along.  Exam is improving.      Neurology Discharge Planning:  TBD    Patient Active Problem List    Diagnosis Date Noted     Failure to thrive in adult 06/24/2022     Priority: Medium     Medical History  History reviewed. No pertinent past medical history.     SUBJECTIVE     Per nursing notes, patient remains disoriented and incoherent.  She does seem to be eating.  No reports of any seizures.     OBJECTIVE     Vital signs in last 24 hours  Temp:  [97.6  F (36.4  C)-98.8  F (37.1  C)] 98.1  F (36.7  C)  Pulse:  [77-84] 77  Resp:  [18-22] 20  BP: (109-141)/(56-68) 141/57  SpO2:  [96 %-97 %] 97 %    Weight:   [unfilled]    Review of Systems   Review of systems not obtained due to patient  factors.    General Physical Exam: Patient is alert and oriented x 0 Vital signs were reviewed and are documented in EMR.  Redness around/involving left eyelid.  No apparent infection.  Neurological Exam:  Mental status: Alert, more interactive today.  Seems to be asking me a question but there is a language barrier.  Answers one of my questions with a nod.  Speech:  Unclear due to language barrier  Cranial nerves: Face appears symmetric.  Right pupil is reactive, cannot test on the left.  Motor: Moves all extremities.  She grabbed on to my stethoscope today.  Sensory: Appears to be intact to light touch but cannot reliably test.        DIAGNOSTIC STUDIES     Pertinent Radiology   Radiology Results: No new images to review.    Pertinent Labs   Lab Results: personally reviewed.   Recent Results (from the past 24 hour(s))   Magnesium    Collection Time: 06/26/22 11:34 AM   Result Value Ref Range    Magnesium 2.1 1.8 - 2.6 mg/dL   Glucose by meter    Collection Time: 06/26/22 12:18 PM   Result Value Ref Range    GLUCOSE BY METER POCT 160 (H) 70 - 99 mg/dL   Magnesium    Collection Time: 06/26/22  3:53 PM   Result Value Ref Range    Magnesium 1.8 1.8 - 2.6 mg/dL   Glucose by meter    Collection Time: 06/26/22  5:02 PM   Result Value Ref Range    GLUCOSE BY METER POCT 153 (H) 70 - 99 mg/dL   Potassium    Collection Time: 06/26/22  6:09 PM   Result Value Ref Range    Potassium 3.6 3.5 - 5.0 mmol/L   Glucose by meter    Collection Time: 06/26/22  9:11 PM   Result Value Ref Range    GLUCOSE BY METER POCT 158 (H) 70 - 99 mg/dL   CBC with platelets    Collection Time: 06/27/22  6:33 AM   Result Value Ref Range    WBC Count 8.2 4.0 - 11.0 10e3/uL    RBC Count 2.92 (L) 3.80 - 5.20 10e6/uL    Hemoglobin 9.1 (L) 11.7 - 15.7 g/dL    Hematocrit 28.6 (L) 35.0 - 47.0 %    MCV 98 78 - 100 fL    MCH 31.2 26.5 - 33.0 pg    MCHC 31.8 31.5 - 36.5 g/dL    RDW 13.0 10.0 - 15.0 %    Platelet Count 122 (L) 150 - 450 10e3/uL   Basic metabolic  panel    Collection Time: 06/27/22  6:33 AM   Result Value Ref Range    Sodium 144 136 - 145 mmol/L    Potassium 4.5 3.5 - 5.0 mmol/L    Chloride 110 (H) 98 - 107 mmol/L    Carbon Dioxide (CO2) 29 22 - 31 mmol/L    Anion Gap 5 5 - 18 mmol/L    Urea Nitrogen 4 (L) 8 - 28 mg/dL    Creatinine 0.72 0.60 - 1.10 mg/dL    Calcium 8.8 8.5 - 10.5 mg/dL    Glucose 101 70 - 125 mg/dL    GFR Estimate 84 >60 mL/min/1.73m2   Magnesium    Collection Time: 06/27/22  6:33 AM   Result Value Ref Range    Magnesium 1.5 (L) 1.8 - 2.6 mg/dL         HOSPITAL MEDICATIONS       divalproex sodium delayed-release  500 mg Oral Q12H [facility] (08/20)     levETIRAcetam  500 mg Oral BID     lisinopril  2.5 mg Oral Daily     magnesium sulfate  2 g Intravenous Once     magnesium sulfate  2 g Intravenous Once     polyethylene glycol  17 g Oral Daily     sertraline  50 mg Oral Daily     simvastatin  10 mg Oral At Bedtime     sodium chloride (PF)  3 mL Intracatheter Q8H        Total time spent for face to face visit, reviewing labs/imaging studies, counseling and coordination of care was: 30 Minutes. More than 50% of this time was spent on counseling and coordination of care.    Dragon software used in the dictation on this note.    Lorraine Magallon MD  [facility] - [address], [address]  [place] [address]

## 2022-06-27 NOTE — PLAN OF CARE
Problem: Pain Acute  Goal: Acceptable Pain Control and Functional Ability  Intervention: Prevent or Manage Pain  Recent Flowsheet Documentation  Taken 6/26/2022 1609 by Harsh Lainez RN  Medication Review/Management: medications reviewed     Problem: Plan of Care - These are the overarching goals to be used throughout the patient stay.    Goal: Optimal Comfort and Wellbeing  6/26/2022 2255 by Harsh Lainez RN  Outcome: Ongoing, Progressing  6/26/2022 1509 by Harsh Lainez RN  Outcome: Ongoing, Progressing   Goal Outcome Evaluation:      Pt is alert but unable to assess orientation. Difficulty communicating with due to incoherent speech. Pt spat out most of her medication mixed with pudding. Pt appears to be comfortable.

## 2022-06-27 NOTE — PROGRESS NOTES
[facility]    Progress Note - Hospitalist Service       Date of Admission:  6/24/2022    Assessment & Plan          Ivan Hook is a 82 year old female admitted on 6/24/2022. She has history of diabetes, hypertension, hyperlipidemia, peripheral neuropathy, previous traumatic subdural hematoma, and seizure and presents for evaluation of decreased responsiveness and decreased oral intake.     Failure to thrive  Decreased oral intake  Decreased responsiveness, acute encephalopathy due to seizures  History of SDH  History of seizure  EEG showing some seizure like activity, antiepileptics subtherapeutic--PTA keppra and depakote. Her subdural hematomas have resolved and there is no evidence of these on CT from admission. There is a moderate to severe burden of chronic small vessel ischemic change with a lot of volume loss that would suggest vascular dementia in the context of her other symptoms.   Per family, patient has not been able to take medications.  No signs of infection.  COVID-19 negative.  UA negative for nitrates and LE.  CT head without acute pathology.  No indication for immediate antibiotics though will obtain cultures and reassess.  Exam overall very benign and nonfocal except that she is not interactive.  -Neurology consulted, appreciate recs  -depakote sprinkes 500mg BID  -Keppra 500mg BID  -discontinue IVF as taking orals.  -PT/OT.  -Blood cultures, NGTD.  Urine culture pending    Dysphagia  Patient with significant difficulty swallowing pills. Transitioning to crushable options. Will need video swallow study--will wait until able to fully participate in cares/follow commands.  -SLP consulted, appreciate recs     SEVERIANO, resolved  Presumably prerenal in the setting of decreased oral intake.  Creatinine 1.34 on admission, baseline around 0.6.  -Daily BMP.     Hypomagnesemia  In the setting of decreased oral intake.  On replacement protocol.     Thrombocytopenia, improving  No  "evidence of acute bleed.  Her platelet count was normal back in April so this is new. INR is elevated to 1.19, PTT wnl.  -Peripheral smear pending  -CBC daily.    DM2  Last A1c 5.7 4/2022.  Not even consistent with diabetes.  In light of this, we will hold metformin while inpatient and could consider stopping this altogether on discharge. Hypoglycemic to 67 this morning, improved with juice.  -Blood sugar checks twice daily.  Can back these off if they remain normal.        Chronic, stable conditions:  HLD: Continue simvastatin.  Mental health: Continue sertraline.  Osteoporosis: hold alendronate.  History of compression fractures.  No obvious new fractures.  HTN: BP WNL.  Continue lisinopril.     Medication Reconciliation Status: Completed.       Diet: Moderate Consistent Carb (60 g CHO per Meal) Diet    DVT Prophylaxis: Pneumatic Compression Devices  Coyne Catheter: PRESENT, indication: Strict 1-2 Hour I&O (will discuss with provider)  Fluids:  None  Central Lines: None  Cardiac Monitoring: None  Code Status: No CPR- Do NOT Intubate      Dispo: observation status for Failure to thrive, anticipate discharge to Home versus facility.   Barriers to discharge:  work up in progress   Anticipated discharge date:  1-2 days     The patient's care was discussed with the Attending physician MD Pancho Herron,   [facility] Medicine Resident PGY-2  Pager: [phone]    Clinically Significant Risk Factors Present on Admission           # Hypomagnesemia: Mg = 1.5 mg/dL (Ref range: 1.8 - 2.6 mg/dL) on admission, will replace as needed      # Thrombocytopenia: Plts = 122 10e3/uL (Ref range: 150 - 450 10e3/uL) on admission, will monitor for bleeding  # Hypertension: home medication list includes antihypertensive(s)    # Cachexia: Estimated body mass index is 15.12 kg/m  as calculated from the following:    Height as of this encounter: 1.372 m (4' 6\").    Weight as of this encounter: 28.4 kg (62 lb 11.2 " oz).        ______________________________________________________________________    Interval History      No acute events overnight.     Pt in no acute distress, pt does interact with in person , alerting to voice, speaks understandable sentences, but not oriented to place or time, participating in cares. Sitting up in chair and eating.    Data reviewed today: I reviewed all medications, new labs and imaging results over the last 24 hours.     Physical Exam   Vital Signs: Temp: 98.1  F (36.7  C) Temp src: Oral BP: (!) 141/57 Pulse: 77   Resp: 20 SpO2: 97 % O2 Device: None (Room air)    Weight: 62 lbs 11.2 oz     General appearance: Cachectic, alerts to voice, not conversant, NAD. Not following simple commands   Head: Normocephalic, atraumatic.  Eyes: conjunctivae/corneas clear.  Throat: MMM.  Neck: supple, symmetrical, trachea midline.  Lungs: clear to auscultation bilaterally, no increased respiratory effort.  Heart: regular rate and rhythm, no murmurs, appropriately perfused.  Abdomen: Nondistended, soft, non-tender; no masses, no organomegaly.  Extremities: extremities atraumatic, no cyanosis, no edema, radial and DP pulses palpable bilaterally.  Skin: Skin color, texture, turgor normal. No rashes or lesions.  Neurologic: Normal tone.  Alerts to voice but does not interact.  Psychiatric: Alerts to voice, does interact with , does not follow instructions.       Data   Recent Labs   Lab 06/27/22  0633 06/26/22  2111 06/26/22  1809 06/26/22  1702 06/26/22  0813 06/26/22  0529 06/25/22  0816 06/25/22  0536 06/25/22  0053 06/25/22  0019 06/24/22  1206   WBC 8.2  --   --   --   --  9.6  --  7.3  --  7.7 12.0*   HGB 9.1*  --   --   --   --  10.0*  --  9.8*  --  10.4* 11.7   MCV 98  --   --   --   --  98  --  99  --  100 98   *  --   --   --   --  91*  --  69*  --  69* 81*   INR  --   --   --   --   --   --   --   --   --  1.19*  --      --   --   --   --  143  --  144  --   --  141    POTASSIUM 4.5  --  3.6  --   --  3.0*  --  3.8  --   --  4.0   CHLORIDE 110*  --   --   --   --  109*  --  110*  --   --  102   CO2 29  --   --   --   --  25  --  25  --   --  25   BUN 4*  --   --   --   --  8  --  14  --   --  21   CR 0.72  --   --   --   --  0.72  --  0.96  --  0.91 1.34*   ANIONGAP 5  --   --   --   --  9  --  9  --   --  14   FLORES 8.8  --   --   --   --  8.5  --  8.5  --   --  9.8    158*  --  153*   < > 95   < > 76   < >  --  106   ALBUMIN  --   --   --   --   --   --   --   --   --   --  3.6   PROTTOTAL  --   --   --   --   --   --   --   --   --   --  8.1*   BILITOTAL  --   --   --   --   --   --   --   --   --   --  0.7   ALKPHOS  --   --   --   --   --   --   --   --   --   --  74   ALT  --   --   --   --   --   --   --   --   --   --  <9   AST  --   --   --   --   --   --   --   --   --   --  17    < > = values in this interval not displayed.     No results found for this or any previous visit (from the past 24 hour(s)).

## 2022-06-27 NOTE — PLAN OF CARE
Problem: Plan of Care - These are the overarching goals to be used throughout the patient stay.    Goal: Plan of Care Review/Shift Note  Description: The Plan of Care Review/Shift note should be completed every shift.  The Outcome Evaluation is a brief statement about your assessment that the patient is improving, declining, or no change.  This information will be displayed automatically on your shift note.  Outcome: Ongoing, Progressing     Problem: Risk for Delirium  Goal: Improved Attention and Thought Clarity  Outcome: Ongoing, Progressing     Problem: Pain Acute  Goal: Acceptable Pain Control and Functional Ability  Outcome: Ongoing, Progressing  Intervention: Prevent or Manage Pain  Recent Flowsheet Documentation  Taken 6/27/2022 0100 by Sofia Alonso, RN  Medication Review/Management: medications reviewed   Goal Outcome Evaluation:     VSS. Pt alert but minimal speech or interaction with . Pt appeared to not be in discomfort or pain. Repositioned pt, provided mouth moisturizing. Coyne patent and draining.

## 2022-06-27 NOTE — PROVIDER NOTIFICATION
PROVIDER NOTIFICATION    Reason for communication: (1) place order to d/c catheter, if not indicated anymore; (2) SLP eval? Pocketing liquids orally & grimaces w/swallowing. Tolerating mashed/soft foods w/o issues.   Team member name: Dr. Morales  Team member role: Primary - [place]  Method of Communication: Text Paged  Response: awaiting for respose; 1329: new orders placed  Response time: awaiting for respose

## 2022-06-28 NOTE — PROGRESS NOTES
Speech-Language Pathology: Clinical Swallow Evaluation     06/28/22 0900   General Information   Onset of Illness/Injury or Date of Surgery 06/24/22   Referring Physician Dr. Gonzalez   Pertinent History of Current Problem Per MD note:    82-year-old woman with history of diabetes, hypertension, hyperlipidemia and subdural hematoma as well as seizures here for failure to thrive and decreased responsiveness.  Suspect underlying cognitive impairment   General Observations Alert, no responses to  but did shake her head no x1   Past History of Dysphagia no records indicating dysphagia history      Language Hmong- patient appeared to comprehend majority of information but did not respond verbally   Type of Evaluation   Type of Evaluation Swallow Evaluation   Oral Motor   Oral Musculature anomalies present   Structural Abnormalities present   Dentition (Oral Motor)   Dentition (Oral Motor) significant number of missing teeth   Facial Symmetry (Oral Motor)   Facial Symmetry (Oral Motor) left side impairment   Left Side Facial Asymmetry moderate impairment   Lip Function (Oral Motor)   Comment, Lip Function (Oral Motor) Adequate lip seal   Tongue Function (Oral Motor)   Comment, Tongue Function (Oral Motor) Unable to assess   Jaw Function (Oral Motor)   Jaw Function (Oral Motor) unable/difficult to assess   General Swallowing Observations   Current Diet/Method of Nutritional Intake (General Swallowing Observations, NIS) thin liquids (level 0);regular diet   Swallowing Evaluation Clinical swallow evaluation   Clinical Swallow Evaluation   Clinical Swallow Evaluation Textures Trialed thin liquids;mildly thick liquids;moderately thick liquids/liquidized;pureed   Clinical Swallow Eval: Thin Liquid Texture Trial   Mode of Presentation, Thin Liquids straw;spoon   Volume of Liquid or Food Presented 2 sips   Oral Phase of Swallow other (see comments)  (oral holding)   Pharyngeal Phase of Swallow coughing/choking    Clinical Swallow Eval: Mildly Thick Liquids   Mode of Presentation spoon;fed by clinician   Volume Presented 3 sips   Oral Phase   (mild oral holding, decreased compared to thin liquids)   Pharyngeal Phase intact   Clinical Swallow Eval: Moderately Thick Liquids   Mode of Presentation spoon;fed by clinician   Volume Presented 1 ounce via spoon   Oral Phase WFL  (minimal oral holding)   Pharyngeal Phase intact   Clinical Swallow Evaluation: Puree Solid Texture Trial   Mode of Presentation, Puree spoon;fed by clinician   Volume of Puree Presented 5 bites   Oral Phase, Puree WFL   Pharyngeal Phase, Puree intact   Esophageal Phase of Swallow   Patient reports or presents with symptoms of esophageal dysphagia No   Swallowing Recommendations   Diet Consistency Recommendations moderately thick liquids/liquidized (level 3);pureed (level 4)   Supervision Level for Intake 1:1 supervision needed   Mode of Delivery Recommendations bolus size, small;slow rate of intake;liquids via spoon only   Monitoring/Assistance Required (Eating/Swallowing) monitor for cough or change in vocal quality with intake;optimize oral intake to minimize need for tube feeding;stop eating activities when fatigue is present   Recommended Feeding/Eating Techniques (Swallow Eval) provide assist with feeding;minimize distractions during oral intake;maintain upright posture during/after eating for 30 minutes;maintain upright sitting position for eating  (cue to swallow as needed)   Medication Administration Recommendations, Swallowing (SLP) crushed in puree   Instrumental Assessment Recommendations VFSS (videofluoroscopic swallowing study)  (anticipate need for VFSS but not appropriate yet at this time given inconsistent alertness this admit and symptoms at bedside appear consistent)   Comment, Swallowing Recommendations Bedside Swallow Study completed. Patient had consistent, overt s/s aspiration with thin liquids via spoon and straw. Oral motor function  was moderately impaired with signficant left facial droop but adequate lip seal. Mastication was not tested for safety reasons. Hyolaryngeal elevation appears intact upon visualization and palpation. Recommend diet of Puree textures and moderately thick liquids with strategies of spoon feed liquids, slow rate of intake, cue to swallow as needed and monitor for s/s aspiration. SLP to follow for diet advancement and tolerance, VFSS readiness and appropriateness and dysphagia management.   General Therapy Interventions   Planned Therapy Interventions Dysphagia Treatment   Dysphagia treatment Compensatory strategies for swallowing;Modified diet education   Clinical Impression   Criteria for Skilled Therapeutic Interventions Met (SLP Eval) Yes, treatment indicated   SLP Diagnosis dysphagia   Risks & Benefits of therapy have been explained evaluation/treatment results reviewed;patient  (unclear comprehension)   Clinical Impression Comments Patient demonstrates s/s aspiration with thin liquids, no s/s aspiration with puree, moderately thick and mildly thick. Decreased oral holding noted with moderately thick.   SLP Discharge Planning   SLP Discharge Recommendation Transitional Care Facility   SLP Rationale for DC Rec below baseline swallow function   SLP Brief overview of current status  Concern for aspiration, puree diet with mod thick liquids recommended. Anticipate VFSS this admission.    Total Evaluation Time   Total Evaluation Time (Minutes) 10   SLP Goals   Therapy Frequency (SLP Eval) daily   SLP Predicted Duration/Target Date for Goal Attainment 07/06/22   SLP Goals Swallow   SLP: Safely tolerate diet without signs/symptoms of aspiration Pureed diet;Mildly thick liquids   Psychosocial Support   Trust Relationship/Rapport care explained

## 2022-06-28 NOTE — PLAN OF CARE
Problem: Plan of Care - These are the overarching goals to be used throughout the patient stay.    Goal: Optimal Comfort and Wellbeing  Outcome: Ongoing, Progressing     Problem: Risk for Delirium  Goal: Improved Attention and Thought Clarity  Outcome: Ongoing, Progressing   Goal Outcome Evaluation:        Pt is confused, unable to assess orientation - pt does not respond to questions. Arouses to touch, unable to follow commands. Does not appear to be in acute distress.    Pt was incontinent of urine during the night. Provided incontinence care and repositioned.

## 2022-06-28 NOTE — PLAN OF CARE
Problem: Plan of Care - These are the overarching goals to be used throughout the patient stay.    Goal: Optimal Comfort and Wellbeing  Outcome: Ongoing, Progressing   Goal Outcome Evaluation:      [id number]  Pt. Resting comfortably most of shift, reposition q2hrs, BG this evening 99, in no apparent pain, will cont to monitor.

## 2022-06-28 NOTE — PLAN OF CARE
NURSING NOTE  0700  -  1500  Goal Outcome Evaluation:    Problem: Pain Acute  Goal: Acceptable Pain Control and Functional Ability  Outcome: Ongoing, Progressing     D: Non-verbal this morning. PAINAD tool used, 5. Moaning, grimacing this afternoon                  when moving left arm/hand & with bed mobility.    A: PRN Tylenol given x1 (see MAR). Repositioned for comfort.    R: Sleeping, resting now and appears comfortable.    Problem: Muscle Strength Impairment; Goal: Improved Muscle Strength  Problem: Swallowing Impairment; Goal: Optimal Eating and Swallowing Without Aspiration  Outcome: Ongoing, Not Progressing     D: Generalized weakness noted. SLP eval done today (see note). [employer]                   used via [employer] - not responding verbally. Doesn't follow simle commands.   A: Total assist given with meals/liquids. Scheduled repositioning on bed.   R: Tolerating current diet & liquids. Will continue with plan of care.     Problem: Plan of Care - These are the overarching goals to be used throughout the patient stay.    Goal: Patient-Specific Goal (Individualized)     Pending hospice consult, per family's request. Attempted to reach the hospice team re:               timeframe for visit. Will need to arrange for a [employer]  as well.              ADDENDUM 1545:  Hospice RN called back. Consult agnes @ 1130, daughter notified.

## 2022-06-28 NOTE — CONSULTS
Hospice consult received. Will meet with patient, family, and  in the pt's room tomorrow at 1130 am to review the hospice program.    Thank you for this consult!    Jill Schoenecker, RN  [facility]  [phone]

## 2022-06-28 NOTE — PROGRESS NOTES
[facility]    Progress Note - Hospitalist Service       Date of Admission:  6/24/2022    Assessment & Plan          Ivan Hook is a 82 year old female admitted on 6/24/2022. She has history of diabetes, hypertension, hyperlipidemia, peripheral neuropathy, previous traumatic subdural hematoma, and seizure and presents for evaluation of decreased responsiveness and decreased oral intake.      Goals of care  Discussion with daughter at bedside 6/28/2022- she reports Ivan has been declining in her function for a while, requiring family to feed her meals and becoming less and less interactive.  They understand that Ivan is likely near the end of her life.  They would prefer to have her at home to care for her.  Are interested in discussing home care options with hospice services.  Qualifying diagnosis would be failure to thrive and no longer taking p.o.  -Hospice consult order placed    Failure to thrive  Decreased oral intake  Decreased responsiveness, acute encephalopathy due to seizures  History of SDH  History of seizure  EEG showing some seizure like activity, antiepileptics subtherapeutic--PTA keppra and depakote. Her subdural hematomas have resolved and there is no evidence of these on CT from admission. There is a moderate to severe burden of chronic small vessel ischemic change with a lot of volume loss that would suggest vascular dementia in the context of her other symptoms.   Per family, patient has not been able to take medications.  No signs of infection.  COVID-19 negative.  UA negative for nitrates and LE.  CT head without acute pathology.  No indication for immediate antibiotics though will obtain cultures and reassess.  Exam overall very benign and nonfocal except that she is not interactive.  -Neurology consulted, appreciate recs   -Rechecking AED levels   -MRI brain recommended-given transition to comfort focus family does not want any further imaging  -depakote sprinkes 500mg  BID  -Keppra 500mg BID  -discontinue IVF as taking orals.  -PT/OT.  -Blood cultures, NGTD.  Urine culture pending    Dysphagia  Patient with significant difficulty swallowing pills. Transitioning to crushable options. Will need video swallow study--will wait until able to fully participate in cares/follow commands.  -SLP consulted, appreciate recs   -Puréed diet, mildly thick liquids     SEVERIANO, resolved  Presumably prerenal in the setting of decreased oral intake.  Creatinine 1.34 on admission, baseline around 0.6.   -Can discontinue blood draws     Hypomagnesemia  In the setting of decreased oral intake.  On replacement protocol.     Thrombocytopenia, improving  No evidence of acute bleed.  Her platelet count was normal back in April so this is new. INR is elevated to 1.19, PTT wnl.  Blood smear with thrombocytopenia, normochromic anemia.  -Can discontinue blood draws    DM2  Last A1c 5.7 4/2022.  Not even consistent with diabetes.  In light of this, we will hold metformin while inpatient and could consider stopping this altogether on discharge.  Did have an episode of hypoglycemia to 67 improved with juice.  -Blood sugar checks twice daily.  Can back these off if they remain normal.        Chronic, stable conditions:  HLD: Continue simvastatin.  Mental health: Continue sertraline.  Osteoporosis: hold alendronate.  History of compression fractures.  No obvious new fractures.  HTN: BP WNL.  Continue lisinopril.     Medication Reconciliation Status: Completed.       Diet: Moderate Consistent Carb (60 g CHO per Meal) Diet    DVT Prophylaxis: Pneumatic Compression Devices  [place] Catheter: Not present  Fluids:  PO  Central Lines: None  Cardiac Monitoring: None  Code Status: No CPR- Do NOT Intubate      Dispo: observation status for Failure to thrive, anticipate discharge to Home versus facility.   Barriers to discharge:  work up in progress   Anticipated discharge date:  1-2 days     The patient's care was discussed with  the Attending physician MD Apolinar Herron MD, PGY1   [facility]   Pgr: [phone]      Clinically Significant Risk Factors Present on Admission                      ______________________________________________________________________    Interval History    No acute events overnight.     Pt in no acute distress, pt does interact with phone , but per  is not answering questions appropriately, speech is difficult to understand.  Does not appear to be oriented to place or situation.  Does shake her head no when asked about pain.  Has been eating today only when fed by staff      Data reviewed today: I reviewed all medications, new labs and imaging results over the last 24 hours.     Physical Exam   Vital Signs: Temp: 97.5  F (36.4  C) Temp src: Axillary BP: 122/58 Pulse: 73   Resp: 18 SpO2: 94 % O2 Device: None (Room air)    Weight: 77 lbs 3 oz     General appearance: Cachectic, alerts to voice, not conversant, NAD. Not following simple commands   Head: Normocephalic, atraumatic.  Eyes: conjunctivae/corneas clear.  Left eye closed  Throat: MMM.  Neck: supple, symmetrical, trachea midline.  Lungs: clear to auscultation bilaterally, no increased respiratory effort.  Heart: regular rate and rhythm, no murmurs, appropriately perfused.  Abdomen: Nondistended, soft, non-tender; no masses, no organomegaly.  Extremities: extremities atraumatic, no cyanosis, no edema, radial and DP pulses palpable bilaterally.  Skin: Skin color, texture, turgor normal. No rashes or lesions.  Neurologic: Normal tone.  Alerts to voice but does not interact.  Psychiatric: Alerts to voice, does interact with , does not follow instructions.       Data   Recent Labs   Lab 06/28/22  0632 06/27/22  2121 06/27/22  0633 06/26/22  2111 06/26/22  1809 06/26/22  0813 06/26/22  0529 06/25/22  0053 06/25/22  0019 06/24/22  1206   WBC 7.1  --  8.2  --   --   --  9.6   < >  7.7 12.0*   HGB 9.2*  --  9.1*  --   --   --  10.0*   < > 10.4* 11.7   MCV 99  --  98  --   --   --  98   < > 100 98   *  --  122*  --   --   --  91*   < > 69* 81*   INR  --   --   --   --   --   --   --   --  1.19*  --    *  --  144  --   --   --  143   < >  --  141   POTASSIUM 3.9  --  4.5  --  3.6  --  3.0*   < >  --  4.0   CHLORIDE 109*  --  110*  --   --   --  109*   < >  --  102   CO2 32*  --  29  --   --   --  25   < >  --  25   BUN 11  --  4*  --   --   --  8   < >  --  21   CR 0.70  --  0.72  --   --   --  0.72   < > 0.91 1.34*   ANIONGAP 5  --  5  --   --   --  9   < >  --  14   FLORES 8.5  --  8.8  --   --   --  8.5   < >  --  9.8   GLC 90 105* 101   < >  --    < > 95   < >  --  106   ALBUMIN  --   --   --   --   --   --   --   --   --  3.6   PROTTOTAL  --   --   --   --   --   --   --   --   --  8.1*   BILITOTAL  --   --   --   --   --   --   --   --   --  0.7   ALKPHOS  --   --   --   --   --   --   --   --   --  74   ALT  --   --   --   --   --   --   --   --   --  <9   AST  --   --   --   --   --   --   --   --   --  17    < > = values in this interval not displayed.     No results found for this or any previous visit (from the past 24 hour(s)).

## 2022-06-28 NOTE — PLAN OF CARE
Problem: Plan of Care - These are the overarching goals to be used throughout the patient stay.    Goal: Plan of Care Review/Shift Note  Problem: Plan of Care - These are the overarching goals to be used throughout the patient stay.    Goal: Optimal Comfort and Wellbeing  Outcome: Ongoing, Progressing     Problem: Pain Acute  Goal: Acceptable Pain Control and Functional Ability  Outcome: Ongoing, Progressing  Intervention: Prevent or Manage Pain  Recent Flowsheet Documentation  Taken 6/27/2022 2000 by Britt Padgett RN  Medication Review/Management: medications reviewed     Problem: Muscle Strength Impairment  Goal: Improved Muscle Strength  Outcome: Ongoing, Progressing     Outcome: Ongoing, Progressing   Goal Outcome Evaluation:    [id number]... Pt is alert to self, confused  but manageable with cares. Language line was used, but the ong  had difficulties understanding the pt and said her thoughts were disorganized. BG= 105 at HS, staff will continue to monitor.

## 2022-06-28 NOTE — PROGRESS NOTES
NEUROLOGY PROGRESS NOTE     ASSESSMENT & PLAN     Diagnosis code: Encephalopathy/decreased responsiveness likely multifactorial. Failure to thrive.  History of seizures and subdural hematoma.     82-year-old woman with history of diabetes, hypertension, hyperlipidemia and subdural hematoma as well as seizures here for failure to thrive and decreased responsiveness.  Suspect underlying cognitive impairment.     Head CT shows nothing acute.  Depakote level came back subtherapeutic at 18.  Recommend increasing the dose to 500mg twice daily.  Monitor platelets (91 today).  Keppra level also came back subtherapeutic (<2. Raises the question of whether the patient is taking her medications at home).  Recommend increasing the dose of this medication to 500mg twice daily.  EEG shows generalized sharp activity predominantly in the left hemisphere and low threshold for focal seizures.  Recheck AED levels.  MRI Brain if able, for continued weakness.  Elevated WBCs.  UA positive for ketones.  Prelim UC and blood cultures negative.  Infection management per primary team.  Low magnesium.  Repletion per primary team, fluctuating.  Glucose management per primary team.  RBC count is trending down.  Management per primary team.  Morphology pending.  PT/O/SLP.  Neurology will follow along.  Exam is stable.     Neurology Discharge Planning:  TBD    Patient Active Problem List    Diagnosis Date Noted     Failure to thrive in adult 06/24/2022     Priority: Medium     Medical History  History reviewed. No pertinent past medical history.     SUBJECTIVE     Nursing notes indicate patient remained confused overnight.  No acute events.    Ivan's nurse is at bedside and tells me that she had to be transferred to the chair and back to bed yesterday, unclear what the strength is in her legs.  She spoke with the patient's daughter who says she is confused at baseline but was still more confused than usual as of yesterday.  Ivan has been eating well  these last couple of days.     OBJECTIVE     Vital signs in last 24 hours  Temp:  [97.5  F (36.4  C)-97.6  F (36.4  C)] 97.5  F (36.4  C)  Pulse:  [65-81] 73  Resp:  [16-18] 18  BP: (105-136)/(58-85) 122/58  SpO2:  [94 %-99 %] 94 %    Weight:   [unfilled]    Review of Systems   Review of systems not obtained due to patient factors.    General Physical Exam: Patient is alert and oriented x 0 Vital signs were reviewed and are documented in EMR.  Redness around/involving left eyelid.  No apparent infection.  Neurological Exam:  Mental status: Alert, more interactive today. Up eating with the nursing assistant.  Speech:  Unclear due to language barrier  Cranial nerves: Face appears symmetric.   Sensory: Appears to be intact to light touch but cannot reliably test.     DIAGNOSTIC STUDIES     Pertinent Radiology   Radiology Results: No new imaging to review.    Pertinent Labs   Lab Results: personally reviewed.   Recent Results (from the past 24 hour(s))   Magnesium    Collection Time: 06/27/22 12:43 PM   Result Value Ref Range    Magnesium 2.1 1.8 - 2.6 mg/dL   Glucose by meter    Collection Time: 06/27/22  9:21 PM   Result Value Ref Range    GLUCOSE BY METER POCT 105 (H) 70 - 99 mg/dL   CBC with platelets    Collection Time: 06/28/22  6:32 AM   Result Value Ref Range    WBC Count 7.1 4.0 - 11.0 10e3/uL    RBC Count 2.90 (L) 3.80 - 5.20 10e6/uL    Hemoglobin 9.2 (L) 11.7 - 15.7 g/dL    Hematocrit 28.6 (L) 35.0 - 47.0 %    MCV 99 78 - 100 fL    MCH 31.7 26.5 - 33.0 pg    MCHC 32.2 31.5 - 36.5 g/dL    RDW 13.0 10.0 - 15.0 %    Platelet Count 144 (L) 150 - 450 10e3/uL   Basic metabolic panel    Collection Time: 06/28/22  6:32 AM   Result Value Ref Range    Sodium 146 (H) 136 - 145 mmol/L    Potassium 3.9 3.5 - 5.0 mmol/L    Chloride 109 (H) 98 - 107 mmol/L    Carbon Dioxide (CO2) 32 (H) 22 - 31 mmol/L    Anion Gap 5 5 - 18 mmol/L    Urea Nitrogen 11 8 - 28 mg/dL    Creatinine 0.70 0.60 - 1.10 mg/dL    Calcium 8.5 8.5 -  10.5 mg/dL    Glucose 90 70 - 125 mg/dL    GFR Estimate 86 >60 mL/min/1.73m2   Magnesium    Collection Time: 06/28/22  6:32 AM   Result Value Ref Range    Magnesium 1.8 1.8 - 2.6 mg/dL         HOSPITAL MEDICATIONS       divalproex sodium delayed-release  500 mg Oral Q12H [facility] (08/20)     levETIRAcetam  500 mg Oral BID     lisinopril  2.5 mg Oral Daily     magnesium sulfate  2 g Intravenous Once     polyethylene glycol  17 g Oral Daily     sertraline  50 mg Oral Daily     simvastatin  10 mg Oral At Bedtime     sodium chloride (PF)  3 mL Intracatheter Q8H        Total time spent for face to face visit, reviewing labs/imaging studies, counseling and coordination of care was: 30 Minutes. More than 50% of this time was spent on counseling and coordination of care.    Dragon software used in the dictation on this note.    Lorraine Magallon MD  [facility] - [address], [address]  [place] [address]

## 2022-06-29 NOTE — PROGRESS NOTES
Care Management Follow Up    Length of Stay (days): 3    Expected Discharge Date: 06/30/2022     Concerns to be Addressed:   Medical progression    Patient plan of care discussed at interdisciplinary rounds: No    Anticipated Discharge Disposition: Home     Anticipated Discharge Services:  Home with PCA services  Anticipated Discharge DME:      Patient/family educated on Medicare website which has current facility and service quality ratings:    Education Provided on the Discharge Plan:    Patient/Family in Agreement with the Plan:      Referrals Placed by CM/SW:  Not at this time  Private pay costs discussed: Not applicable    Additional Information:  Pt and daughter met with hospice to see if this is something pt/family is interested in at discharge. Daughter is going to speak to the rest of the family and will get back to the medical team.  CM will follow up tomorrow regarding this.      Eleanor Pappas RN

## 2022-06-29 NOTE — PROGRESS NOTES
[facility]    Progress Note - Hospitalist Service       Date of Admission:  6/24/2022    Assessment & Plan          Ivan Hook is a 82 year old female admitted on 6/24/2022. She has history of diabetes, hypertension, hyperlipidemia, peripheral neuropathy, previous traumatic subdural hematoma, and seizure and presents for evaluation of decreased responsiveness and decreased oral intake.      Goals of care  Discussion with daughter at bedside 6/28/2022- she reports Ivan has been declining in her function for a while, requiring family to feed her meals and becoming less and less interactive.  They understand that Ivan is likely near the end of her life.  They would prefer to have her at home to care for her.  Are interested in discussing home care options with hospice services.  Qualifying diagnosis would be failure to thrive in the setting of vascular dementia and no longer taking p.o.  -Hospice team is meeting with the family 6/29    Vascular dementia  Failure to thrive  Decreased oral intake  Decreased responsiveness, acute encephalopathy due to seizures  History of SDH  History of seizure  EEG showing some seizure like activity, antiepileptics subtherapeutic--PTA keppra and depakote. Her subdural hematomas have resolved and there is no evidence of these on CT from admission. There is a moderate to severe burden of chronic small vessel ischemic change with a lot of volume loss that would suggest vascular dementia in the context of her other symptoms.   Per family, patient has not been able to take medications.  No signs of infection.  COVID-19 negative.  UA negative for nitrates and LE.  CT head without acute pathology.  No indication for immediate antibiotics though will obtain cultures and reassess.  Exam overall very benign and nonfocal except that she is not interactive.  -Neurology consulted, appreciate recs   -Rechecking AED levels   -MRI brain recommended-given transition to comfort  focus family does not want any further imaging  -depakote sprinkes 500mg BID  -Keppra 500mg BID  -discontinue IVF as taking orals.  -PT/OT.  -Blood cultures, NGTD.  Urine culture NGTD    Dysphagia  Patient with significant difficulty swallowing pills. Transitioning to crushable options. Will need video swallow study--will wait until able to fully participate in cares/follow commands.  -SLP consulted, appreciate recs   -Puréed diet, mildly thick liquids     SEVERIANO, resolved  Presumably prerenal in the setting of decreased oral intake.  Creatinine 1.34 on admission, baseline around 0.6.   -Can discontinue blood draws     Hypomagnesemia  In the setting of decreased oral intake.  On replacement protocol.     Thrombocytopenia, improving  No evidence of acute bleed.  Her platelet count was normal back in April so this is new. INR is elevated to 1.19, PTT wnl.  Blood smear with thrombocytopenia, normochromic anemia.  -Can discontinue blood draws    DM2  Last A1c 5.7 4/2022.  Not even consistent with diabetes.  In light of this, we will hold metformin while inpatient and could consider stopping this altogether on discharge.  Did have an episode of hypoglycemia to 67 improved with juice.  -Blood sugar checks twice daily.  Can back these off if they remain normal.        Chronic, stable conditions:  HLD: Continue simvastatin.  Mental health: Continue sertraline.  Osteoporosis: hold alendronate.  History of compression fractures.  No obvious new fractures.  HTN: BP WNL.  Continue lisinopril.     Medication Reconciliation Status: Completed.       Diet: Combination Diet Moderate Consistent Carb (60 g CHO per Meal) Diet; Minced and Moist Diet (level 5); Thin Liquids (level 0)    DVT Prophylaxis: Pneumatic Compression Devices  [place] Catheter: Not present  Fluids:  PO  Central Lines: None  Cardiac Monitoring: None  Code Status: No CPR- Do NOT Intubate      Dispo: observation status for Failure to thrive, anticipate discharge to Home  versus facility.   Barriers to discharge:  work up in progress   Anticipated discharge date:  1-2 days     The patient's care was discussed with the Attending physician MD Apolinar Herron MD, PGY1   [facility]   Pgr: [phone]      Clinically Significant Risk Factors Present on Admission                      ______________________________________________________________________    Interval History    No acute events overnight.  Requiring total assistance with meals.  Pt in no acute distress, not interacting with phone  or family in the room.  Does not appear to be in pain    Data reviewed today: I reviewed all medications, new labs and imaging results over the last 24 hours.     Physical Exam   Vital Signs: Temp: 98.6  F (37  C) Temp src: Axillary BP: 110/55 Pulse: 74   Resp: 16 SpO2: 95 % O2 Device: None (Room air)    Weight: 77 lbs 3 oz     General appearance: Cachectic, alerts to voice, not conversant, NAD. Not following simple commands   Head: Normocephalic, atraumatic.  Eyes: conjunctivae/corneas clear.   Throat: MMM.  Lungs: clear to auscultation bilaterally, normal respiratory effort.  Heart: regular rate and rhythm, no murmurs, appropriately perfused.  Abdomen: Nondistended, soft, non-tender; no masses, no organomegaly.  Extremities: extremities atraumatic, no cyanosis, no edema  Skin: Skin color, texture, turgor normal. No rashes or lesions.  Neurologic: Normal tone.  Alerts to voice but does not interact.  Psychiatric: Alerts to voice, does interact with , does not follow instructions.       Data   Recent Labs   Lab 06/29/22  0816 06/29/22  0741 06/28/22  2027 06/28/22  1750 06/28/22  0632 06/27/22  2121 06/27/22  0633 06/26/22  2111 06/26/22  1809 06/26/22  0813 06/26/22  0529 06/25/22  0053 06/25/22  0019 06/24/22  1206   WBC  --   --   --   --  7.1  --  8.2  --   --   --  9.6   < > 7.7 12.0*   HGB  --   --   --   --  9.2*  --   9.1*  --   --   --  10.0*   < > 10.4* 11.7   MCV  --   --   --   --  99  --  98  --   --   --  98   < > 100 98   PLT  --   --   --   --  144*  --  122*  --   --   --  91*   < > 69* 81*   INR  --   --   --   --   --   --   --   --   --   --   --   --  1.19*  --    NA  --   --   --   --  146*  --  144  --   --   --  143   < >  --  141   POTASSIUM  --   --   --   --  3.9  --  4.5  --  3.6  --  3.0*   < >  --  4.0   CHLORIDE  --   --   --   --  109*  --  110*  --   --   --  109*   < >  --  102   CO2  --   --   --   --  32*  --  29  --   --   --  25   < >  --  25   BUN  --   --   --   --  11  --  4*  --   --   --  8   < >  --  21   CR  --   --   --   --  0.70  --  0.72  --   --   --  0.72   < > 0.91 1.34*   ANIONGAP  --   --   --   --  5  --  5  --   --   --  9   < >  --  14   FLORES  --   --   --   --  8.5  --  8.8  --   --   --  8.5   < >  --  9.8   GLC 82 70 130*   < > 90   < > 101   < >  --    < > 95   < >  --  106   ALBUMIN  --   --   --   --   --   --   --   --   --   --   --   --   --  3.6   PROTTOTAL  --   --   --   --   --   --   --   --   --   --   --   --   --  8.1*   BILITOTAL  --   --   --   --   --   --   --   --   --   --   --   --   --  0.7   ALKPHOS  --   --   --   --   --   --   --   --   --   --   --   --   --  74   ALT  --   --   --   --   --   --   --   --   --   --   --   --   --  <9   AST  --   --   --   --   --   --   --   --   --   --   --   --   --  17    < > = values in this interval not displayed.     No results found for this or any previous visit (from the past 24 hour(s)).

## 2022-06-29 NOTE — CONSULTS
HOSPICE: Met with dtr Arlyn and pt to discuss hospice philosophy, program, benefits. Utilized language line for  service. Arlyn receptive, asked appropriate questions. She would like to speak to the rest of the family and will get back to the medical team on whether they are in agreement with hospice or not.     Nina Juárez RN  [phone]

## 2022-06-29 NOTE — PLAN OF CARE
NURSING NOTE  0700  -  1500  Goal Outcome Evaluation:    Problem: Muscle Strength Impairment  Goal: Improved Muscle Strength  Outcome: Ongoing, Not Progressing    Weak. BLE very stiff. Intermittently screams with bed mobility/klever cares.  Scheduled repositioning on bed. AO2.       Problem: Swallowing Impairment  Goal: Optimal Eating and Swallowing Without Aspiration  Outcome: Ongoing, Progressing    Second day of SLP eval today - improved. Also more awake. Diet changed to  level 5, liquids changed to thin - tolerating. Total assist.        Problem: Plan of Care - These are the overarching goals to be used throughout the patient stay.    Goal: Plan of Care Review/Shift Note  Outcome: Ongoing, Progressing    - No stool this shift. Last episode unknown.  Food intakes in the last 3 days have been 25-75%.      Abdomen soft. Due Miralax given.  - Improved LOC today, alert and responding verbally. Confused, disoriented x4. Although she     clearly did say that she wanted to go home. Nodeable  via [employer] used.   - Family meeting with daughter, hospice RN, primary provider transpired today.

## 2022-06-29 NOTE — PROGRESS NOTES
Brief Neurology Note:    Chart reviewed.  Patient agitated during the night.  I had ordered an MRI yesterday but family is leaning toward hospice care so the study was not completed.  Updated Keppra level is in therapeutic range.  Valproic acid pending.  Continue current doses of AEDs for now.    Lorraine Magallon MD

## 2022-06-29 NOTE — PROGRESS NOTES
"1330 - Writer paged primary provider, as daughter is inquiring re: discharge plans. She wanted take the patient home. She didn't think that they would sign to hospice.  1405 - Spoke face to face with provider. After speaking with hospice RN, she was under the impression that we are awaiting confirmation from the daughter  re: hospice cares, that the daughter would like to talk with the other family members first before making a decision.   1420 - Writer spoke with daughter, Arlyn. Family is declining hospice. Also declining any type of home care because \"pt doesn't want anybody in her house.\" She further stated that the patient has had home care services before but she would scream at them, not want to be touched, never liked them coming in her home, and she would even lock the doors her room. Her and her  just wanted to take her home. She denied any need for any other clarification from hospice cares. Violette De RN   "

## 2022-06-29 NOTE — PLAN OF CARE
Problem: Plan of Care - These are the overarching goals to be used throughout the patient stay.    Goal: Absence of Hospital-Acquired Illness or Injury  Intervention: Identify and Manage Fall Risk  Recent Flowsheet Documentation    Problem: Plan of Care - These are the overarching goals to be used throughout the patient stay.    Goal: Absence of Hospital-Acquired Illness or Injury  Intervention: Prevent Skin Injury  Recent Flowsheet Documentation  Body Position:   log-rolled   right  Body Position:   log-rolled   left   Safety Promotion/Fall Prevention:   bed alarm on   activity supervised   Patient remained free from falls and injuries during the night. Patient has been kicking and punching nursing staff during cares throughout the night. Patient is Hmong speaker and it is very difficult when this writer used the  line due to the patient confusion and inability to answer questions when asked. She has had poor oral intake during shift. She was able to take few bites of her applesauce during medications administration.

## 2022-06-30 NOTE — PROGRESS NOTES
ISABELLA spoke to Scott Gauthier as to what needs to be done in order for the pt to get more PCA hours. According to Abrahan pt is close to being maxxed out for her PCA services 11/day but she will reach out to the family at discharge and set up an assessment.      Abrahan  Fax: [phone]

## 2022-06-30 NOTE — PLAN OF CARE
"  Problem: Plan of Care - These are the overarching goals to be used throughout the patient stay.    Goal: Plan of Care Review/Shift Note  Description: The Plan of Care Review/Shift note should be completed every shift.  The Outcome Evaluation is a brief statement about your assessment that the patient is improving, declining, or no change.  This information will be displayed automatically on your shift note.  Outcome: Adequate for Care Transition  Goal: Patient-Specific Goal (Individualized)  Description: You can add care plan individualizations to a care plan. Examples of Individualization might be:  \"Parent requests to be called daily at 9am for status\", \"I have a hard time hearing out of my right ear\", or \"Do not touch me to wake me up as it startles me\".  Outcome: Adequate for Care Transition  Goal: Absence of Hospital-Acquired Illness or Injury  Outcome: Adequate for Care Transition  Goal: Optimal Comfort and Wellbeing  Outcome: Adequate for Care Transition  Goal: Readiness for Transition of Care  Outcome: Adequate for Care Transition     Problem: Risk for Delirium  Goal: Optimal Coping  Outcome: Adequate for Care Transition  Goal: Improved Behavioral Control  Outcome: Adequate for Care Transition  Goal: Improved Attention and Thought Clarity  Outcome: Adequate for Care Transition  Goal: Improved Sleep  Outcome: Adequate for Care Transition     Problem: Pain Acute  Goal: Acceptable Pain Control and Functional Ability  Outcome: Adequate for Care Transition     Problem: Electrolyte Imbalance  Goal: Electrolyte Balance  Outcome: Adequate for Care Transition     Problem: Muscle Strength Impairment  Goal: Improved Muscle Strength  Outcome: Adequate for Care Transition     Problem: Swallowing Impairment  Goal: Optimal Eating and Swallowing Without Aspiration  Outcome: Adequate for Care Transition     Problem: Violence Risk or Actual  Goal: Anger and Impulse Control  Outcome: Adequate for Care Transition   Goal " Outcome Evaluation:     Patient will be cared for by family and has refused outside help.

## 2022-06-30 NOTE — PLAN OF CARE
Physical Therapy Discharge Summary    Reason for therapy discharge:    Discharged to home.    Progress towards therapy goal(s). See goals on Care Plan in [facility] electronic health record for goal details.  Goals not met.  Barriers to achieving goals:   limited tolerance for therapy and discharge from facility.    Therapy recommendation(s):    Continued therapy is recommended.  Rationale/Recommendations:  recommending further PT, pt discharge to home with family without PT.

## 2022-06-30 NOTE — PLAN OF CARE
"  Problem: Plan of Care - These are the overarching goals to be used throughout the patient stay.    Goal: Patient-Specific Goal (Individualized)  Description: You can add care plan individualizations to a care plan. Examples of Individualization might be:  \"Parent requests to be called daily at 9am for status\", \"I have a hard time hearing out of my right ear\", or \"Do not touch me to wake me up as it startles me\".  Outcome: Ongoing, Progressing     Problem: Risk for Delirium  Goal: Optimal Coping  Outcome: Ongoing, Progressing  Goal: Improved Behavioral Control  Outcome: Ongoing, Progressing  Goal: Improved Attention and Thought Clarity  Outcome: Ongoing, Progressing  Goal: Improved Sleep  Outcome: Ongoing, Progressing   Goal Outcome Evaluation:           Pt alert to self, Hmong speaking, sleep most of the shift. Bed alarm on for safety on the sensitive area. Refuses to get reposition in bed and can be resist care at times. Incontinent of B&B.            "

## 2022-06-30 NOTE — PLAN OF CARE
Speech Language Therapy Discharge Summary    Reason for therapy discharge:    Discharged to home.    Progress towards therapy goal(s). See goals on Care Plan in [facility] electronic health record for goal details.  Goals partially met.  Barriers to achieving goals:   discharge from facility., limited oral intake    Therapy recommendation(s):    No further therapy is recommended. Monitor patient's tolerance of oral intake and request SLP services as needed.

## 2022-06-30 NOTE — PLAN OF CARE
Problem: Plan of Care - These are the overarching goals to be used throughout the patient stay.    Goal: Optimal Comfort and Wellbeing  Outcome: Ongoing, Progressing   Goal Outcome Evaluation:    Patient is hmong speaking,  needed, babbles and says nonsensical things. Denies pain. Repositioned, although patient does make change in position independently at times. Incontinent of B&B. VSS. Took pills crushed in pudding.

## 2022-06-30 NOTE — PROGRESS NOTES
Care Management Discharge Note    Discharge Date: 06/30/2022       Discharge Disposition: Home with family/PCA    Discharge Services:  None family declined HC    Discharge DME:  none    Discharge Transportation: family or friend will provide    Private pay costs discussed: Not applicable    PAS Confirmation Code:    Patient/family educated on Medicare website which has current facility and service quality ratings:      Education Provided on the Discharge Plan: yes   Persons Notified of Discharge Plans: yes  Patient/Family in Agreement with the Plan:  yes    Handoff Referral Completed: Yes    Additional Information:  Pt adequate for discharge no further CM needs at this time.        Eleanor Pappas RN

## 2022-06-30 NOTE — DISCHARGE SUMMARY
[facility]  Discharge Summary - Medicine & Pediatrics       Date of Admission:  6/24/2022  Date of Discharge:  6/30/2022  Discharging Provider: Dr. Barron  Discharge Service: Hospitalist Service    Discharge Diagnoses   Failure to thrive in adult  Seizures  Vascular dementia  Dysphagia      Follow-ups Needed After Discharge   Follow-up Appointments     Follow-up and recommended labs and tests       Follow up with primary care provider, Jl Kohler, within 7 days for   hospital follow- up.  No follow up labs or test are needed.             Unresulted Labs Ordered in the Past 30 Days of this Admission     Date and Time Order Name Status Description    6/28/2022  8:18 AM Valproic Acid Free & Total In process       These results will be followed up by PCP    Discharge Disposition   Discharged to home  Condition at discharge: Poor    Hospital Course   Ivan Hook was admitted on 6/24/2022 for acute encephalopathy due to seizures in the setting of worsening dysphagia and inability to take her AED.  The following problems were addressed during her hospitalization:      Goals of care  Discussion with daughter, Arlyn, at bedside 6/28/2022- she reports Ivan has been declining in her function for a while, requiring family to feed her meals and becoming less and less interactive.  They understand that Ivan is likely near the end of her life.  Did meet with hospice nurse and  while in the hospital, initially seemed interested in the services.  However, after discussion with larger family, they decided they prefer not to have other people coming into the home and prefer to care for now themselves.  Arlyn is already set up as Mao's PCA, 10 hours/day of services.  Social work is attempting to get her qualified for more PCA hours.  Social work will provide information about a [facility] community home care service agency.       Vascular dementia  Failure to thrive  Decreased oral intake  Decreased  responsiveness, acute encephalopathy due to seizures  History of SDH  History of seizure  Per family, Ivan has had increasing difficulty swallowing over the last several months.  Suspect that she has not been able to take many of her antiepileptic medications.  She presented with significantly decreased ability to interact meaningfully, essentially nonresponsive. EEG showing some seizure like activity, antiepileptics subtherapeutic--PTA keppra and depakote. Her subdural hematomas have resolved and there is no evidence of these on CT from admission. There is a moderate to severe burden of chronic small vessel ischemic change with a lot of volume loss that would suggest vascular dementia in the context of her other symptoms. CT head without acute pathology.  Neurology was consulted and recommended MRI brain, however, family did not feel further invasive work-up was in alignment with goals of care.  AED levels were checked and normalized.  Transition to Depakote sprinkles and crushed Keppra.    Dysphagia  Patient with significant difficulty swallowing pills. Transitioning to crushable options. SLP consulted- Puréed diet, mildly thick liquids     Thrombocytopenia, improving  No evidence of acute bleed.  Her platelet count was normal back in April so this is new. INR is elevated to 1.19, PTT wnl.  Blood smear with thrombocytopenia, normochromic anemia.       DM2  Last A1c 5.7 4/2022.  Not even consistent with diabetes.  In light of this, we held metformin while inpatient and will discontinue on discharge      Consultations This Hospital Stay   OCCUPATIONAL THERAPY ADULT IP CONSULT  PHYSICAL THERAPY ADULT IP CONSULT  NEUROSURGERY IP CONSULT  NEUROLOGY IP CONSULT  NEUROLOGY IP CONSULT  NEUROLOGY IP CONSULT  CARE MANAGEMENT / SOCIAL WORK IP CONSULT  SPEECH LANGUAGE PATH ADULT IP CONSULT  INPATIENT HOSPICE ADULT CONSULT    Code Status   No CPR- Do NOT Intubate       The patient was discussed with Dr. Anderson Ramírez,  MD [place] [id number] [facility] [address]  Phone: [phone]  Fax: [phone]  ______________________________________________________________________    Physical Exam   Vital Signs: Temp: 98.1  F (36.7  C) Temp src: Oral BP: 124/60 Pulse: 76   Resp: 18 SpO2: 93 % O2 Device: None (Room air)    Weight: 77 lbs 3 oz  General appearance: Cachectic, alerts to voice, NAD, is speaking today, per  not always making sense  Head: Normocephalic, atraumatic.  Eyes: conjunctivae/corneas clear, keeps left eye dozed  Throat: MMM.  Lungs: clear to auscultation bilaterally, normal respiratory effort.  Heart: regular rate and rhythm, no murmurs, appropriately perfused.  Abdomen: Nondistended, soft, non-tender; no masses, no organomegaly.  Extremities: extremities atraumatic, no cyanosis, no edema  Skin: Skin color, texture, turgor normal. No rashes or lesions.  Neurologic: Normal tone.  Alerts to voice but does not interact.  Psychiatric: Alerts to voice, does interact with , does not follow instructions.       Primary Care Physician   Jl Kohler    Discharge Orders      Reason for your hospital stay    You were hospitalized after a period of unresponsiveness.  It appears that you have had trouble swallowing for several months, and were not getting enough of your antiseizure medicines.  While in the hospital we began to give you medicines crushed in food and your mental status improved.  The neurologist, brain doctors, saw you and recommended that we could get an MRI of your brain to look for other problems.  However, after discussion with family we decided not to do this test.  Your family met with the hospice nurses, to discuss home support during end-of-life care.  However, family decided not to use hospice services at this time.     Follow-up and recommended labs and tests     Follow up with primary care provider, Jl Kohler, within 7 days for hospital  follow- up.  No follow up labs or test are needed.     Activity    Your activity upon discharge: activity as tolerated     Diet    Follow this diet upon discharge: Soft and thick/pureed foods only       Significant Results and Procedures   Most Recent 3 CBC's:Recent Labs   Lab Test 06/28/22  0632 06/27/22  0633 06/26/22  0529   WBC 7.1 8.2 9.6   HGB 9.2* 9.1* 10.0*   MCV 99 98 98   * 122* 91*     Most Recent 3 BMP's:Recent Labs   Lab Test 06/30/22  0746 06/29/22  1808 06/29/22  1228 06/28/22  1750 06/28/22  0632 06/27/22 2121 06/27/22 0633 06/26/22 2111 06/26/22 1809 06/26/22  0813 06/26/22  0529   NA  --   --   --   --  146*  --  144  --   --   --  143   POTASSIUM  --   --   --   --  3.9  --  4.5  --  3.6  --  3.0*   CHLORIDE  --   --   --   --  109*  --  110*  --   --   --  109*   CO2  --   --   --   --  32*  --  29  --   --   --  25   BUN  --   --   --   --  11  --  4*  --   --   --  8   CR  --   --   --   --  0.70  --  0.72  --   --   --  0.72   ANIONGAP  --   --   --   --  5  --  5  --   --   --  9   FLORES  --   --   --   --  8.5  --  8.8  --   --   --  8.5   GLC 73 114* 141*   < > 90   < > 101   < >  --    < > 95    < > = values in this interval not displayed.   ,   Results for orders placed or performed during the hospital encounter of 06/24/22   Head CT w/o contrast    Narrative    EXAM: CT HEAD W/O CONTRAST  LOCATION: [facility]  DATE/TIME: 6/24/2022 1:55 PM    INDICATION: Failure to thrive decreased activity, weakness  COMPARISON: 04/25/2022 head CT. 07/26/2020 head CT and 10/19/2019 head CT.  TECHNIQUE: Routine CT Head without IV contrast. Multiplanar reformats. Dose reduction techniques were used.    FINDINGS:  INTRACRANIAL CONTENTS: No intracranial hemorrhage, extraaxial collection, or mass effect.  No CT evidence of acute infarct. Unchanged coarse calcification in the parasagittal left superior frontal gyrus. Moderate to severe burden chronic small vessel   ischemic  change is unchanged. Moderate generalized volume loss. No hydrocephalus.     VISUALIZED ORBITS/SINUSES/MASTOIDS: Prior bilateral cataract surgery. Visualized portions of the orbits are otherwise unremarkable. Near-complete opacification of the right maxillary sinus with mild opacification in the posterior left ethmoid air cells.   Opacified left sphenoid sinus. No middle ear or mastoid effusion.    BONES/SOFT TISSUES: No acute abnormality.      Impression    IMPRESSION:  1.  No acute intracranial abnormality.    2.  Moderate diffuse parenchymal volume loss with a moderate to severe burden chronic small vessel ischemic change is unchanged. Coarse calcification in the parasagittal left superior frontal gyrus is unchanged.   XR Chest Port 1 View    Narrative    EXAM: XR CHEST PORT 1 VIEW  LOCATION: [facility]  DATE/TIME: 6/25/2022 12:17 AM    INDICATION: Elevated WBC, AMS.  COMPARISON: 04/25/2022      Impression    IMPRESSION: There is new minimal blunting of the left cardiophrenic angle likely base of minimal pleural fluid or thickening. Otherwise the chest is stable with again seen partially calcified thoracic aorta. Old right rib fractures. Postoperative changes   right upper quadrant most typical for cholecystectomy. No active CHF, pneumothorax, or inflammatory infiltrates identified.       Discharge Medications   Current Discharge Medication List      START taking these medications    Details   divalproex sodium delayed-release (DEPAKOTE SPRINKLE) 125 MG DR capsule Take 500 mg by mouth every 12 hours  Qty: 240 capsule, Refills: 0    Associated Diagnoses: Seizures (H)         CONTINUE these medications which have CHANGED    Details   levETIRAcetam (KEPPRA) 500 MG tablet Take 1 tablet (500 mg) by mouth 2 times daily for 30 days  Qty: 60 tablet, Refills: 0    Comments: Crush and give in puree  Associated Diagnoses: Seizures (H)         CONTINUE these medications which have NOT CHANGED     Details   lisinopril (ZESTRIL) 2.5 MG tablet Take 2.5 mg by mouth daily      sertraline (ZOLOFT) 50 MG tablet Take 50 mg by mouth daily      simvastatin (ZOCOR) 10 MG tablet Take 10 mg by mouth At Bedtime         STOP taking these medications       alendronate (FOSAMAX) 70 MG tablet Comments:   Reason for Stopping:         divalproex sodium delayed-release (DEPAKOTE) 250 MG DR tablet Comments:   Reason for Stopping:         metFORMIN (GLUCOPHAGE) 850 MG tablet Comments:   Reason for Stopping:             Allergies   No Known Allergies

## 2022-07-01 NOTE — PROGRESS NOTES
[facility] [place]    Background: Care Coordination referral placed from [facility] discharge report for reason of patient meeting criteria for a TCM outreach call by [facility] [place] team.    Assessment: Upon chart review, [facility] Team member will cancel/close the referral for TCM outreach due to reason below:    Patient isn't interested in speaking with care team today and disconnected call    Plan: Care Coordination referral for TCM outreach canceled.      Mary Shaffer MA  [facility] [place], [facility]

## 2022-07-18 PROBLEM — M62.81 MUSCLE WEAKNESS (GENERALIZED): Status: ACTIVE | Noted: 2022-01-01

## 2022-07-18 PROBLEM — R40.2430: Status: ACTIVE | Noted: 2022-01-01

## 2022-07-18 PROBLEM — A41.89 SEPSIS DUE TO OTHER ETIOLOGY (H): Status: ACTIVE | Noted: 2022-01-01

## 2022-07-18 PROBLEM — E87.0 HYPERNATREMIA: Status: ACTIVE | Noted: 2022-01-01

## 2022-07-18 NOTE — Clinical Note
Ivan Hook was seen and treated in our emergency department on 7/18/2022.         Sincerely,     [facility]

## 2022-07-18 NOTE — LETTER
RETURN TO WORK FORM    7/19/2022    Re: Ivan Hook  1940      To Whom It May Concern:     The family member of Ivan Hook should be excused from work 07/19/22 to take care of their loved one in the hospital.      Aki Felton MD  7/19/2022 2:23 PM

## 2022-07-19 PROBLEM — Z51.5 HOSPICE CARE: Status: ACTIVE | Noted: 2022-01-01

## 2022-07-19 NOTE — PROGRESS NOTES
responded to Spiritual Care consult. Pt is unresponsive, family is not present at this time. RN relayed that she will pass in the next few hours. Pt's family may have left to get her traveling clothes.    Spiritual Care is available as needed.    MORRIS Flores.

## 2022-07-19 NOTE — ED PROVIDER NOTES
EMERGENCY DEPARTMENT ENCOUNTER      NAME: Ivan Hook  AGE: 82 year old female  YOB: 1940  MRN: [id number]  EVALUATION DATE & TIME: 7/18/2022  9:11 PM    PCP: Jl Kohler    ED PROVIDER: Marcial Greer MD        Chief Complaint   Patient presents with     Decreased LOC         FINAL IMPRESSION:  1. Sanjuana coma scale total score 3-8, unspecified coma timing (H)    2. Hypernatremia    3. Muscle weakness (generalized)          ED COURSE & MEDICAL DECISION MAKING:    Pertinent Labs & Imaging studies reviewed. (See chart for details)  82 year old female presents to the Emergency Department for evaluation of altered mental status    I was called to patient's room immediately upon arrival for not responding to tactile stimuli    Airway is patent, bilateral breath sounds, weak thready pulses, GCS 3.  She has some lipsmacking and some intermittent tremors versus rigors to her extremities but no clonus    With tactile stimuli she does not open her eyes, make any noise, or have any movements. She is true GCS 3.  Blood sugar normal    History of seizures and reportedly has not been taking her medications.  2 days and per initial report was full code therefore lorazepam and Keppra was ordered    Labs were ordered and pleural chest x-ray is ordered to evaluate for aspiration    Patient meets sepsis criteria with lactic acid, leukocytosis, fever, tachycardia    After long discussion with patient's decision maker her daughter Arlyn on phone via , decision was made to focus on comfort and goal for comfort care with no antibiotics, no further imaging, no intubation, no ICU stay, and focus on making patient comfortable    [place] knows that patients may die tonight.  No family available tonight to come to bedside but family will be here tomorrow    Hospitalist pagelyndsay, signout to Dr. Romero pendfiona hospitalist returning phone call and admission for full comfort care        ED Course as of 07/18/22 1028 Mon Jul  18, 2022 2257 Valproic acid (Depakote level)   2258 WBC(!): 32.6   2258 Sodium(!!): 155   2258 Ph Venous(!): 7.32   2258 PCO2 Venous(!): 53   2258 GLUCOSE BY METER POCT(!): 205   2258 Lactic Acid(!!): 4.1         9:20 PM I met with the patient, obtained history, performed an initial exam, and discussed options and plan for diagnostics and treatment here in the ED.  9:37 PM Spoke to the patient's family via phone.     At the conclusion of the encounter I discussed the results of all of the tests and the disposition. The questions were answered. The patient or family acknowledged understanding and was agreeable with the care plan.         MEDICATIONS GIVEN IN THE EMERGENCY:  Medications   naloxone (NARCAN) injection 0.1 mg (has no administration in time range)   naloxone (NARCAN) injection 0.2 mg (has no administration in time range)   HYDROmorphone (STANDARD CONC) (DILAUDID) oral solution 1-2 mg (has no administration in time range)     Or   HYDROmorphone (DILAUDID) half-tab 1-2 mg (has no administration in time range)   HYDROmorphone (DILAUDID) injection 0.3-0.5 mg (has no administration in time range)   LORazepam (ATIVAN) injection 1 mg (has no administration in time range)     Or   LORazepam (ATIVAN) tablet 1 mg (has no administration in time range)   OLANZapine zydis (zyPREXA) ODT tab 5 mg (has no administration in time range)   ondansetron (ZOFRAN ODT) ODT tab 4 mg (has no administration in time range)     Or   ondansetron (ZOFRAN) injection 4 mg (has no administration in time range)   prochlorperazine (COMPAZINE) injection 5 mg (has no administration in time range)     Or   prochlorperazine (COMPAZINE) tablet 5 mg (has no administration in time range)     Or   prochlorperazine (COMPAZINE) suppository 12.5 mg (has no administration in time range)   bisacodyl (DULCOLAX) suppository 10 mg (has no administration in time range)   atropine 1 % ophthalmic solution 2 drop (has no administration in time range)  "  carboxymethylcellulose PF (REFRESH PLUS) 0.5 % ophthalmic solution 1-2 drop (has no administration in time range)   mineral oil-hydrophilic petrolatum (AQUAPHOR) (has no administration in time range)   0.9% sodium chloride BOLUS (1,000 mLs Intravenous New Bag 7/18/22 2152)   LORazepam (ATIVAN) injection 1 mg (1 mg Intravenous Given 7/18/22 2142)       NEW PRESCRIPTIONS STARTED AT TODAY'S ER VISIT  New Prescriptions    No medications on file          =================================================================    HPI    Triage note  \"Patient brought to ER via [facility]. Patient has had decreased loc that has worsened over the last 2 days per family. History of seizure and diabetes. Patient hypotensive upon EMS arrival.      \"      Patient information was obtained from: EMS and daughter on phone        Ivan Hook is a 82 year old female with a pertinent history of failure to thrive, seizures, sepsis who presents to this ED the ambulance for evaluation of weakness and altered mental status    Recently hospitalized for failure to thrive and found to have seizures hospice discussion was had but ultimately declined    Per daughter Arlyn on phone via  patient has not been opening her eyes for few days and are having a put water down her throat.  She is been not taking her medications and she has been more confused and weaker than usual      REVIEW OF SYSTEMS   Review of Systems   Unable to perform ROS: Mental status change        PAST MEDICAL HISTORY:  Past Medical History:   Diagnosis Date     Diabetes mellitus (H)      HLD (hyperlipidemia)      Hypertension      Osteoporosis 10/20/2019     Peripheral neuropathy        PAST SURGICAL HISTORY:  Past Surgical History:   Procedure Laterality Date     CHOLECYSTECTOMY             CURRENT MEDICATIONS:    alendronate (FOSAMAX) 70 MG tablet  divalproex sodium delayed-release (DEPAKOTE SPRINKLE) 125 MG DR capsule  divalproex sodium delayed-release (DEPAKOTE) 250 " MG DR tablet  levETIRAcetam (KEPPRA) 250 MG tablet  levETIRAcetam (KEPPRA) 500 MG tablet  lisinopril (ZESTRIL) 2.5 MG tablet  lisinopril (ZESTRIL) 2.5 MG tablet  metFORMIN (GLUCOPHAGE) 850 MG tablet  sertraline (ZOLOFT) 50 MG tablet  sertraline (ZOLOFT) 50 MG tablet  simvastatin (ZOCOR) 10 MG tablet  simvastatin (ZOCOR) 10 MG tablet        ALLERGIES:  No Known Allergies    FAMILY HISTORY:  No family history on file.    SOCIAL HISTORY:   Social History     Socioeconomic History     Marital status:    Tobacco Use     Smoking status: Never Smoker     Smokeless tobacco: Never Used   Substance and Sexual Activity     Alcohol use: No     Drug use: No   Social History Narrative    ** Merged History Encounter **            VITALS:  BP 95/51   Pulse (!) 137   Temp (!) 100.5  F (38.1  C) (Axillary)   Resp (!) 7   SpO2 95%     PHYSICAL EXAM      Vitals: BP 95/51   Pulse (!) 137   Temp (!) 100.5  F (38.1  C) (Axillary)   Resp (!) 7   SpO2 95%   General: Ill-appearing, GCS 3  Eyes: Opacified left orbit, right eye reactive to light but possible nystagmus but not reacting to movement  HENT: Atraumatic.  Dry mucous membranes and lipsmacking  Neck: Supple.  Respiratory/Chest: Respiration unlabored.  No stridor  Heart: Weak thready rapid pulses  Abdomen: non distended  Musculoskeletal: Normal extremities. No edema or erythema.  Skin: Normal color. No rash or diaphoresis.  Neurologic: GCS 3, no clonus, increased muscle tone, intermittent tremor/giror right arm and left arm       LAB:  All pertinent labs reviewed and interpreted.  Results for orders placed or performed during the hospital encounter of 07/18/22   XR Chest Port 1 View    Impression    IMPRESSION: Heart size is normal. Left basilar peripheral scarring or atelectasis. No CHF, new lobar consolidation or large effusions. Old right rib fractures. Splenic artery aneurysm measuring 9 mm. Multiple clips in the right upper quadrant compatible   prior  cholecystectomy, with numerous adjacent dropped stones..   Glucose by meter   Result Value Ref Range    GLUCOSE BY METER POCT 205 (H) 70 - 99 mg/dL   Comprehensive metabolic panel   Result Value Ref Range    Sodium 155 (HH) 136 - 145 mmol/L    Potassium 3.8 3.5 - 5.0 mmol/L    Chloride 113 (H) 98 - 107 mmol/L    Carbon Dioxide (CO2) 24 22 - 31 mmol/L    Anion Gap 18 5 - 18 mmol/L    Urea Nitrogen 51 (H) 8 - 28 mg/dL    Creatinine 1.70 (H) 0.60 - 1.10 mg/dL    Calcium 9.7 8.5 - 10.5 mg/dL    Glucose 251 (H) 70 - 125 mg/dL    Alkaline Phosphatase 75 45 - 120 U/L     (H) 0 - 40 U/L    ALT 34 0 - 45 U/L    Protein Total 8.9 (H) 6.0 - 8.0 g/dL    Albumin 2.5 (L) 3.5 - 5.0 g/dL    Bilirubin Total 0.9 0.0 - 1.0 mg/dL    GFR Estimate 30 (L) >60 mL/min/1.73m2   Lactic acid whole blood   Result Value Ref Range    Lactic Acid 4.1 (HH) 0.7 - 2.0 mmol/L   Troponin I (now)   Result Value Ref Range    Troponin I 0.06 0.00 - 0.29 ng/mL   Result Value Ref Range    Magnesium 1.9 1.8 - 2.6 mg/dL   Blood gas venous   Result Value Ref Range    pH Venous 7.32 (L) 7.35 - 7.45    pCO2 Venous 53 (H) 35 - 50 mm Hg    pO2 Venous 23 (L) 25 - 47 mm Hg    Bicarbonate Venous 24 24 - 30 mmol/L    Base Excess/Deficit (+/-) 1.0   mmol/L    Oxyhemoglobin Venous 27.4 (L) 70.0 - 75.0 %    O2 Sat, Venous 27.9 (L) 70.0 - 75.0 %   Ammonia (on ice)   Result Value Ref Range    Ammonia 25 11 - 35 umol/L   CBC with platelets and differential   Result Value Ref Range    WBC Count 32.6 (H) 4.0 - 11.0 10e3/uL    RBC Count 3.52 (L) 3.80 - 5.20 10e6/uL    Hemoglobin 11.0 (L) 11.7 - 15.7 g/dL    Hematocrit 36.3 35.0 - 47.0 %     (H) 78 - 100 fL    MCH 31.3 26.5 - 33.0 pg    MCHC 30.3 (L) 31.5 - 36.5 g/dL    RDW 13.0 10.0 - 15.0 %    Platelet Count     Manual Differential   Result Value Ref Range    % Neutrophils 75 %    % Lymphocytes 10 %    % Monocytes 15 %    % Eosinophils 0 %    % Basophils 0 %    Absolute Neutrophils 24.5 (H) 1.6 - 8.3 10e3/uL     Absolute Lymphocytes 3.3 0.8 - 5.3 10e3/uL    Absolute Monocytes 4.9 (H) 0.0 - 1.3 10e3/uL    Absolute Eosinophils 0.0 0.0 - 0.7 10e3/uL    Absolute Basophils 0.0 0.0 - 0.2 10e3/uL    RBC Morphology Confirmed RBC Indices     Platelet Assessment Platelets Clumped (A) Automated Count Confirmed. Platelet morphology is normal.       RADIOLOGY:  Reviewed all pertinent imaging. Please see official radiology report.  XR Chest Port 1 View   Final Result   IMPRESSION: Heart size is normal. Left basilar peripheral scarring or atelectasis. No CHF, new lobar consolidation or large effusions. Old right rib fractures. Splenic artery aneurysm measuring 9 mm. Multiple clips in the right upper quadrant compatible    prior cholecystectomy, with numerous adjacent dropped stones..          EKG:    Performed at: 18 Jul 2022    Impression: narrow complex tachycardia     Rate: 150  Rhythm: sinus vs A flutter vs SVT  IL Interval: 126  QRS Interval: 68  QTc Interval: 448  ST Changes: nonspecific      I have independently reviewed and interpreted the EKG(s) documented above.    PROCEDURES:         I, Lorene Celaya, am serving as a scribe to document services personally performed by Ant Greer MD based on my observation and the provider's statements to me. I, Dr. Ant Greer, attest that Lorene Celaya is acting in a scribe capacity, has observed my performance of the services and has documented them in accordance with my direction.    Ant Greer MD  Emergency Medicine  [facility] EMERGENCY DEPARTMENT  [address] [place] [id number] [phone]     Ant Greer MD  07/18/22 7502

## 2022-07-19 NOTE — PHARMACY-ADMISSION MEDICATION HISTORY
Pharmacy Note - Admission Medication History    Pertinent Provider Information: none     ______________________________________________________________________    Prior To Admission (PTA) med list completed and updated in EMR.       PTA Med List   Medication Sig Last Dose     alendronate (FOSAMAX) 70 MG tablet Take 70 mg by mouth every 7 days Past Week at Unknown time     divalproex sodium delayed-release (DEPAKOTE SPRINKLE) 125 MG DR capsule Take 500 mg by mouth every 12 hours Past Week at Unknown time     levETIRAcetam (KEPPRA) 500 MG tablet Take 1 tablet (500 mg) by mouth 2 times daily for 30 days Past Week at Unknown time     lisinopril (ZESTRIL) 2.5 MG tablet Take 2.5 mg by mouth daily Past Week at Unknown time     metFORMIN (GLUCOPHAGE) 850 MG tablet Take 850 mg by mouth daily Past Week at Unknown time     sertraline (ZOLOFT) 50 MG tablet Take 50 mg by mouth daily Past Week at Unknown time     simvastatin (ZOCOR) 10 MG tablet Take 10 mg by mouth At Bedtime Past Week at Unknown time     simvastatin (ZOCOR) 10 MG tablet Take 10 mg by mouth At Bedtime Past Week at Unknown time       Information source(s): Family member  Method of interview communication: in-person    Summary of Changes to PTA Med List  New: none  Discontinued: duplicates  Changed: none    Patient was asked about OTC/herbal products specifically.  PTA med list reflects this.      Patient does not use any multi-dose medications prior to admission.    The information provided in this note is only as accurate as the sources available at the time of the update(s).    Thank you for the opportunity to participate in the care of this patient.    Georgie Stone RPH  7/19/2022 9:31 AM

## 2022-07-19 NOTE — ED TRIAGE NOTES
Patient brought to ER via [facility]. Patient has had decreased loc that has worsened over the last 2 days per family. History of seizure and diabetes. Patient hypotensive upon EMS arrival.

## 2022-07-19 NOTE — PHARMACY-VANCOMYCIN DOSING SERVICE
"Pharmacy Vancomycin Initial Note  Date of Service 2022  Patient's  1940  82 year old, female    Indication: Sepsis    Current estimated CrCl = Estimated Creatinine Clearance: 34.8 mL/min (based on SCr of 0.7 mg/dL).    Creatinine for last 3 days  No results found for requested labs within last 72 hours.    Recent Vancomycin Level(s) for last 3 days  No results found for requested labs within last 72 hours.      Vancomycin IV Administrations (past 72 hours)      No vancomycin orders with administrations in past 72 hours.                Nephrotoxins and other renal medications (From now, onward)    Start     Dose/Rate Route Frequency Ordered Stop    22 2230  vancomycin (VANCOCIN) 750 mg in sodium chloride 0.9 % 250 mL intermittent infusion         750 mg  over 60-90 Minutes Intravenous ONCE 22 2210      22 2200  piperacillin-tazobactam (ZOSYN) 3.375 g vial to attach to  mL bag        Note to Pharmacy: For [facility], [facility] and [facility]: For Zosyn-naive patients, use the \"Zosyn initial dose + extended infusion\" order panel.    3.375 g  over 30 Minutes Intravenous ONCE 22 2158            Contrast Orders - past 72 hours (72h ago, onward)    None                Plan:  1. Start vancomycin  750 mg IV once  Please re-consult pharmacy to continue vancomycin.     Chey Armstrong, [facility]    "

## 2022-07-19 NOTE — CONSULTS
"        ______________________________________________________________________        Consult Note - [facility]    ______________________________________________________________________    [facility] 24/7 Contact Number: [phone]    - Providers: Please contact [facility] with changes in orders or clinical plan of care   - Nursing: Please contact [facility] with significant changes in patient condition    Hospice will notify the care team (including the hospitalist) to confirm date of inpatient hospice (GIP) admission.    New Epic encounter will not be created until hospice completes admission.   ______________________________________________________________________        CONSULT NOTIFICATION:    S: received call/text from ED regarding this patient   B: Pt  In ED with admission symptoms of generalized weakness and altered mental status.  She has a history of dementia.  Her family is requesting at sign on \"comfort cares\"     A: nurse is with patient and she is reporting she is comfortable and she states patient is imminent  In appearance AEB RR 40's, HR : 170  and non responsive. Patient is eligible for GIP . Will need to admit upon admission to the hospital from the ED    R: will be available to admit to GIP upon hospital admission.    Thank you for this referral        Lori Linares, RN  [phone]  "

## 2022-07-19 NOTE — PROGRESS NOTES
House officer called to pronounce Ivan Hook dead.  Patient unresponsive to verbal and tactile stimuli.  No heart sounds heard, no pulse felt.  No spontaneous respirations.  Pupils fixed and dilated.  Patient pronounced dead at 12:22 PM on 7/19/2022.  Spoke with family at bedside.    [facility]  [facility] House Officer

## 2022-07-19 NOTE — DISCHARGE SUMMARY
[facility]    Death Summary - Medicine & Pediatrics    Date of Admission:  7/18/2022  Date of Death: 7/19/2022  Attending Provider: Dr. Rosenstein    Discharge Diagnoses   End stage dementia      Cause of death: Cardiac arrest in the setting of end stage [facility]    Hospital Course   Ivan Hook was admitted on 7/18/2022 for cognitive decline.  The following problems were addressed during her hospitalization:    End-stage dementia   DNR/DNI  Patient with progressively worsening medical and functional status (over last 3-6 months) to the point of being minimally responsive.  GCS ~3 on admission.  When she arrived at the ED, there was some mention of her being full code; workup obtained and she is severely hypernatremic, elevated lactate, leukocytosis. Albumin 2.5; dwindling over time. Patient without medical capacity to make medical decisions. It was clarified with family that she is DNR/DNI and family did wish to pursue hospice care. She passed away peacefully on 7/19 at 12:22 PM.      Aki Felton MD  [facility]    ______________________________________________________________________      Significant Results and Procedures   Results for orders placed or performed during the hospital encounter of 07/18/22   XR Chest Port 1 View    Narrative    EXAM: XR CHEST PORT 1 VIEW  LOCATION: [facility]  DATE/TIME: 7/18/2022 9:54 PM    INDICATION: tachycardia  COMPARISON: 06/25/2022      Impression    IMPRESSION: Heart size is normal. Left basilar peripheral scarring or atelectasis. No CHF, new lobar consolidation or large effusions. Old right rib fractures. Splenic artery aneurysm measuring 9 mm. Multiple clips in the right upper quadrant compatible   prior cholecystectomy, with numerous adjacent dropped stones..       Consultations This Hospital Stay   PHARMACY TO DOSE VANCO  VASCULAR ACCESS ADULT IP CONSULT  SPIRITUAL HEALTH SERVICES IP  CONSULT  PALLIATIVE CARE ADULT IP CONSULT  PALLIATIVE CARE ADULT IP CONSULT  PHARMACY IP CONSULT  GIP INPATIENT HOSPICE ADULT CONSULT    Primary Care Physician   Jl Kohler

## 2022-07-19 NOTE — ED NOTES
EMERGENCY DEPARTMENT SIGN OUT NOTE        ED COURSE AND MEDICAL DECISION MAKING  Patient was signed out to me by Dr Marcial Greer at 10:50 PM.    In brief, Ivan Hook is a 82 year old female who initially presented generalized weakness and altered mental status that her daughter had noticed. She has not been opening her eyes for the past few days and not taking her medications. She has been feeling more generally weak and more confused as of lately.     At time of sign out, disposition was pending admission for comfort care.  Plan of care have been discussed with Dr. Greer and family.  Patient to be made comfort care and I discussed that plan with resident covering [facility].  Plan will be admit for medicine diet and comfort care.    11:17 PM I was informed that patient's family will be coming to bedside.  11:35 PM Care discussed with Dr. Gilbert, [facility] hospitalist who accepts the patient for admission.      FINAL IMPRESSION    1. Sanjuana coma scale total score 3-8, unspecified coma timing (H)    2. Hypernatremia    3. Muscle weakness (generalized)    4. Sepsis due to other etiology (H)      ED MEDS  Medications   naloxone (NARCAN) injection 0.1 mg (has no administration in time range)   naloxone (NARCAN) injection 0.2 mg (has no administration in time range)   HYDROmorphone (STANDARD CONC) (DILAUDID) oral solution 1-2 mg (has no administration in time range)     Or   HYDROmorphone (DILAUDID) half-tab 1-2 mg (has no administration in time range)   HYDROmorphone (DILAUDID) injection 0.3-0.5 mg (has no administration in time range)   LORazepam (ATIVAN) injection 1 mg (has no administration in time range)     Or   LORazepam (ATIVAN) tablet 1 mg (has no administration in time range)   OLANZapine zydis (zyPREXA) ODT tab 5 mg (has no administration in time range)   ondansetron (ZOFRAN ODT) ODT tab 4 mg (has no administration in time range)     Or   ondansetron (ZOFRAN) injection 4 mg (has no administration in  time range)   prochlorperazine (COMPAZINE) injection 5 mg (has no administration in time range)     Or   prochlorperazine (COMPAZINE) tablet 5 mg (has no administration in time range)     Or   prochlorperazine (COMPAZINE) suppository 12.5 mg (has no administration in time range)   bisacodyl (DULCOLAX) suppository 10 mg (has no administration in time range)   atropine 1 % ophthalmic solution 2 drop (has no administration in time range)   carboxymethylcellulose PF (REFRESH PLUS) 0.5 % ophthalmic solution 1-2 drop (has no administration in time range)   mineral oil-hydrophilic petrolatum (AQUAPHOR) (has no administration in time range)   0.9% sodium chloride BOLUS (0 mLs Intravenous Stopped 7/18/22 2322)   LORazepam (ATIVAN) injection 1 mg (1 mg Intravenous Given 7/18/22 2142)     LAB  Labs Ordered and Resulted from Time of ED Arrival to Time of ED Departure   GLUCOSE BY METER - Abnormal       Result Value    GLUCOSE BY METER POCT 205 (*)    COMPREHENSIVE METABOLIC PANEL - Abnormal    Sodium 155 (*)     Potassium 3.8      Chloride 113 (*)     Carbon Dioxide (CO2) 24      Anion Gap 18      Urea Nitrogen 51 (*)     Creatinine 1.70 (*)     Calcium 9.7      Glucose 251 (*)     Alkaline Phosphatase 75       (*)     ALT 34      Protein Total 8.9 (*)     Albumin 2.5 (*)     Bilirubin Total 0.9      GFR Estimate 30 (*)    LACTIC ACID WHOLE BLOOD - Abnormal    Lactic Acid 4.1 (*)    BLOOD GAS VENOUS - Abnormal    pH Venous 7.32 (*)     pCO2 Venous 53 (*)     pO2 Venous 23 (*)     Bicarbonate Venous 24      Base Excess/Deficit (+/-) 1.0      Oxyhemoglobin Venous 27.4 (*)     O2 Sat, Venous 27.9 (*)    CBC WITH PLATELETS AND DIFFERENTIAL - Abnormal    WBC Count 32.6 (*)     RBC Count 3.52 (*)     Hemoglobin 11.0 (*)     Hematocrit 36.3       (*)     MCH 31.3      MCHC 30.3 (*)     RDW 13.0      Platelet Count       DIFFERENTIAL - Abnormal    % Neutrophils 75      % Lymphocytes 10      % Monocytes 15      %  Eosinophils 0      % Basophils 0      Absolute Neutrophils 24.5 (*)     Absolute Lymphocytes 3.3      Absolute Monocytes 4.9 (*)     Absolute Eosinophils 0.0      Absolute Basophils 0.0      RBC Morphology Confirmed RBC Indices      Platelet Assessment Platelets Clumped (*)    TROPONIN I - Normal    Troponin I 0.06     MAGNESIUM - Normal    Magnesium 1.9     AMMONIA - Normal    Ammonia 25     VALPROIC ACID   KEPPRA (LEVETIRACETAM) LEVEL   BLOOD CULTURE   BLOOD CULTURE     RADIOLOGY    XR Chest Port 1 View   Final Result   IMPRESSION: Heart size is normal. Left basilar peripheral scarring or atelectasis. No CHF, new lobar consolidation or large effusions. Old right rib fractures. Splenic artery aneurysm measuring 9 mm. Multiple clips in the right upper quadrant compatible    prior cholecystectomy, with numerous adjacent dropped stones..        DISCHARGE MEDS  New Prescriptions    No medications on file     Gigi Romero MD  [facility] EMERGENCY DEPARTMENT  [facility] [facility] [id number] [phone]     Gigi Romero MD  07/18/22 6284

## 2022-07-19 NOTE — H&P
[facility]    History and Physical - Hospitalist Service       Date of Admission:  7/18/2022    Assessment & Plan       Ivan Hook is a 82 year old female hx of dementia with progressively worsening medical and functional status who is brought in via EMS for evaluation of decreased level of arousal.  Patient is DNR/DNI and family wishes to pursue hospice care    Hospice with comfort cares  End-stage dementia   DNR/DNI  Patient with progressively worsening medical and functional status (over last 3-6 months) to the point of being minimally responsive.  GCS ~3 on admission.  When she arrived at the ED, there was some mention of her being full code; workup obtained and she is severely hypernatremic, elevated lactate, leukocytosis. Albumin 2.5; dwindling over time. Patient without medical capacity to make medical decisions. It was clarified with family that she is DNR/DNI and family does wish to pursue hospice care.  There is a high likelihood that she will die overnight.   PLAN:  - Admit to inpatient hospice (GIP)   - Confirmed with family they do not want IV fluids, abx, etc   - Comfort care orders initiated (pain, behavior, etc meds as in orderset)   - Regular diet   - Compassionate allowance of family members at bedside, appreciate staff   - Please notify if further concerns        Diet: Regular Diet Adult  DVT Prophylaxis: none   [place] Catheter: Not present  Fluids: none   Central Lines: None  Cardiac Monitoring: None  Code Status: No CPR- Do NOT Intubate      Clinically Significant Risk Factors Present on Admission         # Hypernatremia: Na = 155 mmol/L (Ref range: 136 - 145 mmol/L) on admission, will monitor as appropriate     # Hypoalbuminemia: Albumin = 2.5 g/dL (Ref range: 3.5 - 5.0 g/dL) on admission, will monitor as appropriate  # Acute Kidney Injury, unspecified: based on a >150% or 0.3 mg/dL increase in creatinine on admission compared to past 90 day average, will monitor renal  function    # Hypertension: home medication list includes antihypertensive(s)          Disposition Plan         Did update on-call attending, Dr. Barron, given status of patient   The patient's care was discussed with the attending physicians at morning report.     Viet Roe DO   [facility] Medicine Resident   Pager#[phone]    Securely message with the Vocera Web Console (learn more here)  Text page via [employer] Paging/Directory       ______________________________________________________________________    Chief Complaint   Decreased level of arousal    History is obtained from the patient's family, ED, medical chart     History of Present Illness   Ivan Hook is a 82 year old female hx of dementia with progressively worsening medical and functional status who is brought in via EMS for evaluation of decreased level of arousal. Pt unable to provide hx due to mental status.     Recently admitted 6/24-6/30 in context of progressive difficulty swallowing and encephalopathy felt to acutely be due to seizures on chronic seizure disorder (on chronic progressing dementia, also hx of SDH). Main focus was goals of care and had transitioned to end-of-life-type care with daughter serving as PCA.     Sounds as if functional status had been worsening at home and just less responsive today; new family member may have been involved in care and was shocked at appearance -- called EMS. Brought her in.     Evaluated by ED -- workup obtained. Goals of care discussion again done with daughter who confirmed DNR/DNI status (not full code). She discussed with broader family as well. They would like hospice care.      Discussed with family at bedside with [facility] interpretor. They confirm above.     Review of Systems    The 10 point Review of Systems is negative other than noted in the HPI or here.     Past Medical History    I have reviewed this patient's medical history and updated it with pertinent information if needed.   Past  Medical History:   Diagnosis Date     Diabetes mellitus (H)      HLD (hyperlipidemia)      Hypertension      Osteoporosis 10/20/2019     Peripheral neuropathy       Past Surgical History   I have reviewed this patient's surgical history and updated it with pertinent information if needed.  Past Surgical History:   Procedure Laterality Date     CHOLECYSTECTOMY        Social History   I have reviewed this patient's social history and updated it with pertinent information if needed. Ivan Hook  reports that she has never smoked. She has never used smokeless tobacco. She reports that she does not drink alcohol and does not use drugs.    Family History   No known family hx heart dz    Prior to Admission Medications   Prior to Admission Medications   Prescriptions Last Dose Informant Patient Reported? Taking?   alendronate (FOSAMAX) 70 MG tablet   Yes No   Sig: Take 70 mg by mouth every 7 days   divalproex sodium delayed-release (DEPAKOTE SPRINKLE) 125 MG DR capsule   No No   Sig: Take 500 mg by mouth every 12 hours   divalproex sodium delayed-release (DEPAKOTE) 250 MG DR tablet   Yes No   Sig: Take 250 mg by mouth 2 times daily   levETIRAcetam (KEPPRA) 250 MG tablet   Yes No   Sig: Take 250 mg by mouth 2 times daily   levETIRAcetam (KEPPRA) 500 MG tablet   No No   Sig: Take 1 tablet (500 mg) by mouth 2 times daily for 30 days   lisinopril (ZESTRIL) 2.5 MG tablet   Yes No   Sig: Take 2.5 mg by mouth daily   lisinopril (ZESTRIL) 2.5 MG tablet   Yes No   Sig: Take 2.5 mg by mouth daily   metFORMIN (GLUCOPHAGE) 850 MG tablet   Yes No   Sig: Take 850 mg by mouth daily   sertraline (ZOLOFT) 50 MG tablet   Yes No   Sig: Take 50 mg by mouth daily   sertraline (ZOLOFT) 50 MG tablet   Yes No   Sig: Take 50 mg by mouth daily   simvastatin (ZOCOR) 10 MG tablet   Yes No   Sig: Take 10 mg by mouth At Bedtime   simvastatin (ZOCOR) 10 MG tablet   Yes No   Sig: Take 10 mg by mouth At Bedtime      Facility-Administered Medications: None      Allergies   No Known Allergies    Physical Exam   Vital Signs: Temp: (!) 100.5  F (38.1  C) Temp src: Axillary BP: 95/51 Pulse: (!) 137   Resp: (!) 7 SpO2: 98 % O2 Device: None (Room air)    Weight: 0 lbs 0 oz    Gen: Exceedingly frail. minimially responsive as below.   GCS:    E: 1-2    V: 1-2   M: 1-2   Total 3-6  HEENT: Dry.   Neck: No overt asymmetry.   CV: Appears pale.  Resp: Breathing comfortably on room air  Abd: Non-distended.   Ext: No edema or overt asymmetry/deformity.   Neuro: Non-focal.   Psych: Calm.     Data   Data reviewed today: I reviewed all medications, new labs and imaging results over the last 24 hours. I personally reviewed      Recent Labs   Lab 07/18/22 2142 07/18/22 2110   WBC 32.6*  --    HGB 11.0*  --    *  --    *  --    POTASSIUM 3.8  --    CHLORIDE 113*  --    CO2 24  --    BUN 51*  --    CR 1.70*  --    ANIONGAP 18  --    FLORES 9.7  --    * 205*   ALBUMIN 2.5*  --    PROTTOTAL 8.9*  --    BILITOTAL 0.9  --    ALKPHOS 75  --    ALT 34  --    *  --      Recent Results (from the past 24 hour(s))   XR Chest Port 1 View    Narrative    EXAM: XR CHEST PORT 1 VIEW  LOCATION: [facility]  DATE/TIME: 7/18/2022 9:54 PM    INDICATION: tachycardia  COMPARISON: 06/25/2022      Impression    IMPRESSION: Heart size is normal. Left basilar peripheral scarring or atelectasis. No CHF, new lobar consolidation or large effusions. Old right rib fractures. Splenic artery aneurysm measuring 9 mm. Multiple clips in the right upper quadrant compatible   prior cholecystectomy, with numerous adjacent dropped stones..

## 2022-07-23 LAB
BACTERIA BLD CULT: NO GROWTH
BACTERIA BLD CULT: NO GROWTH